# Patient Record
Sex: FEMALE | Race: WHITE | NOT HISPANIC OR LATINO | Employment: OTHER | URBAN - METROPOLITAN AREA
[De-identification: names, ages, dates, MRNs, and addresses within clinical notes are randomized per-mention and may not be internally consistent; named-entity substitution may affect disease eponyms.]

---

## 2017-01-03 ENCOUNTER — HOSPITAL ENCOUNTER (OUTPATIENT)
Dept: RADIOLOGY | Facility: MEDICAL CENTER | Age: 62
End: 2017-01-03
Attending: NURSE PRACTITIONER
Payer: COMMERCIAL

## 2017-01-03 ENCOUNTER — HOSPITAL ENCOUNTER (OUTPATIENT)
Dept: PHYSICAL THERAPY | Facility: REHABILITATION | Age: 62
End: 2017-01-03
Attending: NURSE PRACTITIONER
Payer: COMMERCIAL

## 2017-01-03 DIAGNOSIS — M25.561 ACUTE PAIN OF RIGHT KNEE: ICD-10-CM

## 2017-01-03 DIAGNOSIS — M79.672 BILATERAL FOOT PAIN: ICD-10-CM

## 2017-01-03 DIAGNOSIS — M79.671 BILATERAL FOOT PAIN: ICD-10-CM

## 2017-01-03 PROCEDURE — 97110 THERAPEUTIC EXERCISES: CPT

## 2017-01-03 PROCEDURE — 77077 JOINT SURVEY SINGLE VIEW: CPT

## 2017-01-03 PROCEDURE — 97161 PT EVAL LOW COMPLEX 20 MIN: CPT

## 2017-01-03 PROCEDURE — 73564 X-RAY EXAM KNEE 4 OR MORE: CPT | Mod: RT

## 2017-01-04 ENCOUNTER — TELEPHONE (OUTPATIENT)
Dept: MEDICAL GROUP | Facility: MEDICAL CENTER | Age: 62
End: 2017-01-04

## 2017-01-04 NOTE — TELEPHONE ENCOUNTER
----- Message from Shirin Hill M.D. sent at 1/4/2017 11:32 AM PST -----  Please advise pt foot x-ray shows osteoarthritis at the left big toe joint. Her right foot is normal and there are no other joints affected.  Shirin Hill M.D. covering for DORYS Lopez

## 2017-01-04 NOTE — TELEPHONE ENCOUNTER
----- Message from DORYS Lopez sent at 1/4/2017  7:56 AM PST -----  Please let Fadumo know that her knee x-ray shows arthritis.  I would like her to see an orthopedist to go over x-ray and potential treatment.  Is she ok with this and does she have an Ortho preference?

## 2017-01-05 ENCOUNTER — HOSPITAL ENCOUNTER (OUTPATIENT)
Dept: PHYSICAL THERAPY | Facility: REHABILITATION | Age: 62
End: 2017-01-05
Attending: NURSE PRACTITIONER
Payer: COMMERCIAL

## 2017-01-05 PROCEDURE — 97110 THERAPEUTIC EXERCISES: CPT

## 2017-01-05 PROCEDURE — 97140 MANUAL THERAPY 1/> REGIONS: CPT

## 2017-01-09 ENCOUNTER — HOSPITAL ENCOUNTER (OUTPATIENT)
Dept: PHYSICAL THERAPY | Facility: REHABILITATION | Age: 62
End: 2017-01-09
Attending: NURSE PRACTITIONER
Payer: COMMERCIAL

## 2017-01-09 PROCEDURE — 97140 MANUAL THERAPY 1/> REGIONS: CPT

## 2017-01-09 PROCEDURE — 97110 THERAPEUTIC EXERCISES: CPT

## 2017-01-11 ENCOUNTER — APPOINTMENT (OUTPATIENT)
Dept: PHYSICAL THERAPY | Facility: REHABILITATION | Age: 62
End: 2017-01-11
Attending: NURSE PRACTITIONER
Payer: COMMERCIAL

## 2017-01-13 ENCOUNTER — HOSPITAL ENCOUNTER (OUTPATIENT)
Dept: LAB | Facility: MEDICAL CENTER | Age: 62
End: 2017-01-13
Attending: NURSE PRACTITIONER
Payer: COMMERCIAL

## 2017-01-13 DIAGNOSIS — E78.49 OTHER HYPERLIPIDEMIA: ICD-10-CM

## 2017-01-13 DIAGNOSIS — M25.50 MULTIPLE JOINT PAIN: ICD-10-CM

## 2017-01-13 DIAGNOSIS — R73.09 ELEVATED HEMOGLOBIN A1C: ICD-10-CM

## 2017-01-13 LAB
ALBUMIN SERPL BCP-MCNC: 4.5 G/DL (ref 3.2–4.9)
ALBUMIN/GLOB SERPL: 1.6 G/DL
ALP SERPL-CCNC: 60 U/L (ref 30–99)
ALT SERPL-CCNC: 15 U/L (ref 2–50)
ANION GAP SERPL CALC-SCNC: 9 MMOL/L (ref 0–11.9)
AST SERPL-CCNC: 18 U/L (ref 12–45)
BASOPHILS # BLD AUTO: 0.03 K/UL (ref 0–0.12)
BASOPHILS NFR BLD AUTO: 0.6 % (ref 0–1.8)
BILIRUB SERPL-MCNC: 0.5 MG/DL (ref 0.1–1.5)
BUN SERPL-MCNC: 24 MG/DL (ref 8–22)
CALCIUM SERPL-MCNC: 9.6 MG/DL (ref 8.5–10.5)
CHLORIDE SERPL-SCNC: 97 MMOL/L (ref 96–112)
CHOLEST SERPL-MCNC: 134 MG/DL (ref 100–199)
CO2 SERPL-SCNC: 29 MMOL/L (ref 20–33)
CREAT SERPL-MCNC: 0.93 MG/DL (ref 0.5–1.4)
EOSINOPHIL # BLD: 0.11 K/UL (ref 0–0.51)
EOSINOPHIL NFR BLD AUTO: 2.4 % (ref 0–6.9)
ERYTHROCYTE [DISTWIDTH] IN BLOOD BY AUTOMATED COUNT: 47 FL (ref 35.9–50)
ERYTHROCYTE [SEDIMENTATION RATE] IN BLOOD BY WESTERGREN METHOD: 26 MM/HOUR (ref 0–30)
EST. AVERAGE GLUCOSE BLD GHB EST-MCNC: 131 MG/DL
GLOBULIN SER CALC-MCNC: 2.9 G/DL (ref 1.9–3.5)
GLUCOSE SERPL-MCNC: 177 MG/DL (ref 65–99)
HBA1C MFR BLD: 6.2 % (ref 0–5.6)
HCT VFR BLD AUTO: 38.5 % (ref 37–47)
HDLC SERPL-MCNC: 43 MG/DL
HGB BLD-MCNC: 12.2 G/DL (ref 12–16)
IMM GRANULOCYTES # BLD AUTO: 0.02 K/UL (ref 0–0.11)
IMM GRANULOCYTES NFR BLD AUTO: 0.4 % (ref 0–0.9)
LDLC SERPL CALC-MCNC: 66 MG/DL
LYMPHOCYTES # BLD: 1.48 K/UL (ref 1–4.8)
LYMPHOCYTES NFR BLD AUTO: 31.8 % (ref 22–41)
MCH RBC QN AUTO: 27.7 PG (ref 27–33)
MCHC RBC AUTO-ENTMCNC: 31.7 G/DL (ref 33.6–35)
MCV RBC AUTO: 87.5 FL (ref 81.4–97.8)
MONOCYTES # BLD: 0.54 K/UL (ref 0–0.85)
MONOCYTES NFR BLD AUTO: 11.6 % (ref 0–13.4)
NEUTROPHILS # BLD: 2.47 K/UL (ref 2–7.15)
NEUTROPHILS NFR BLD AUTO: 53.2 % (ref 44–72)
NRBC # BLD AUTO: 0 K/UL
NRBC BLD-RTO: 0 /100 WBC
PLATELET # BLD AUTO: 226 K/UL (ref 164–446)
PMV BLD AUTO: 11.5 FL (ref 9–12.9)
POTASSIUM SERPL-SCNC: 3.7 MMOL/L (ref 3.6–5.5)
PROT SERPL-MCNC: 7.4 G/DL (ref 6–8.2)
RBC # BLD AUTO: 4.4 M/UL (ref 4.2–5.4)
RHEUMATOID FACT SERPL-ACNC: <10 IU/ML (ref 0–14)
SODIUM SERPL-SCNC: 135 MMOL/L (ref 135–145)
TRIGL SERPL-MCNC: 127 MG/DL (ref 0–149)
TSH SERPL DL<=0.005 MIU/L-ACNC: 2.31 UIU/ML (ref 0.3–3.7)
WBC # BLD AUTO: 4.7 K/UL (ref 4.8–10.8)

## 2017-01-13 PROCEDURE — 85652 RBC SED RATE AUTOMATED: CPT

## 2017-01-13 PROCEDURE — 83036 HEMOGLOBIN GLYCOSYLATED A1C: CPT

## 2017-01-13 PROCEDURE — 86431 RHEUMATOID FACTOR QUANT: CPT

## 2017-01-13 PROCEDURE — 36415 COLL VENOUS BLD VENIPUNCTURE: CPT

## 2017-01-13 PROCEDURE — 80053 COMPREHEN METABOLIC PANEL: CPT

## 2017-01-13 PROCEDURE — 85025 COMPLETE CBC W/AUTO DIFF WBC: CPT

## 2017-01-13 PROCEDURE — 86038 ANTINUCLEAR ANTIBODIES: CPT

## 2017-01-13 PROCEDURE — 80061 LIPID PANEL: CPT

## 2017-01-13 PROCEDURE — 84443 ASSAY THYROID STIM HORMONE: CPT

## 2017-01-15 LAB — NUCLEAR IGG SER QL IA: NORMAL

## 2017-01-16 ENCOUNTER — OFFICE VISIT (OUTPATIENT)
Dept: MEDICAL GROUP | Facility: MEDICAL CENTER | Age: 62
End: 2017-01-16
Payer: COMMERCIAL

## 2017-01-16 VITALS
DIASTOLIC BLOOD PRESSURE: 72 MMHG | WEIGHT: 205 LBS | SYSTOLIC BLOOD PRESSURE: 118 MMHG | HEART RATE: 96 BPM | OXYGEN SATURATION: 96 % | HEIGHT: 65 IN | RESPIRATION RATE: 18 BRPM | TEMPERATURE: 96.8 F | BODY MASS INDEX: 34.16 KG/M2

## 2017-01-16 DIAGNOSIS — E66.9 OBESITY (BMI 30-39.9): ICD-10-CM

## 2017-01-16 DIAGNOSIS — Z12.31 ENCOUNTER FOR SCREENING MAMMOGRAM FOR BREAST CANCER: ICD-10-CM

## 2017-01-16 DIAGNOSIS — R73.09 ELEVATED HEMOGLOBIN A1C: ICD-10-CM

## 2017-01-16 DIAGNOSIS — Z00.00 WELL ADULT EXAM: ICD-10-CM

## 2017-01-16 PROCEDURE — 99396 PREV VISIT EST AGE 40-64: CPT | Performed by: NURSE PRACTITIONER

## 2017-01-16 NOTE — MR AVS SNAPSHOT
"        Fadumo Adair   2017 11:00 AM   Office Visit   MRN: [id number]    Department:  [address]   Dept Phone:  [phone]    Description:  Female : 1955   Provider:  DORYS Lopez           Reason for Visit     Annual Wellness Visit           Allergies as of 2017     No Known Allergies      You were diagnosed with     Well adult exam   [076028]       Elevated hemoglobin A1c   [356456]       Encounter for screening mammogram for breast cancer   [0744825]         Vital Signs     Blood Pressure Pulse Temperature Respirations Height Weight    118/72 mmHg 96 36 °C (96.8 °F) 18 1.638 m (5' 4.5\") 92.987 kg (205 lb)    Body Mass Index Oxygen Saturation Last Menstrual Period Smoking Status          34.66 kg/m2 96% 09/10/2011 Former Smoker        Basic Information     Date Of Birth Sex Race Ethnicity Preferred Language    1955 Female White Non- English      Your appointments     2017 11:45 AM   [place] [facility] [place]    [address] [facility] [address]   [phone]            2017  9:30 AM   PT Follow Up 45 Minutes [facility] [place]    [address] [facility] [address]   [phone]            2017 11:45 AM   PT Follow Up 45 Minutes [facility] [place]    [address] [facility] [address]   [phone]            2017 11:45 AM   PT Follow Up 45 Minutes [facility] [place]    [address] [facility] [address]   [phone]            2017 10:15 AM   PT Follow Up 45 Minutes [facility] [place]    [address] [facility] [address]   [phone]            2017 11:00 AM   PT Follow Up 45 Minutes with Carlton Sigala, " P.T.   [facility] [place]    [address] [facility] [address]   [phone]            Feb 06, 2017 10:15 AM   PT Follow Up 45 Minutes [facility] [place]    [address] [facility] [address]   [phone]            Feb 08, 2017 11:00 AM   PT Follow Up 45 Minutes with [facility] [place]    [address] [facility] [address]   [phone]            May 16, 2017 11:00 AM   Established Patient with DORYS Lopez   [facility] ([address])    [address] [place] [address] [address]  [facility] [address]   [phone]           You will be receiving a confirmation call a few days before your appointment from our automated call confirmation system.              Problem List              ICD-10-CM Priority Class Noted - Resolved    Hyperlipemia E78.5   Unknown - Present    Low back pain M54.5   Unknown - Present    Family history of ovarian cancer Z80.41   8/4/2010 - Present    Obesity (BMI 30-39.9) E66.9   3/10/2012 - Present    Insomnia G47.00   5/14/2014 - Present    Elevated hemoglobin A1c R73.09   11/18/2014 - Present    Essential hypertension I10   10/23/2015 - Present    Generalized headaches R51   12/19/2016 - Present      Health Maintenance        Date Due Completion Dates    MAMMOGRAM 5/30/2014 5/30/2013, 5/19/2011, 5/21/2007, 5/21/2007, 3/23/2006, 3/21/2005, 1/5/2004    PAP SMEAR 5/14/2017 5/14/2014, 5/18/2011    COLONOSCOPY 5/14/2019 5/14/2009 (Done)    Override on 5/14/2009: Done    IMM DTaP/Tdap/Td Vaccine (2 - Td) 12/13/2020 12/13/2010            Current Immunizations     Hep A/HEP B Combined Vaccine (TwinRix) 8/30/2012 10:43 AM    Hepatitis A Vaccine, Ped/Adol 12/13/2010  3:53 PM    IPV 8/30/2012 10:43 AM    Influenza TIV (IM) 10/15/2016, 10/11/2011  7:08 PM    Influenza Vaccine Pediatric 12/13/2010  3:54 PM    Influenza Vaccine Quad Inj (Pf) 9/23/2014     Influenza Vaccine Quad Inj (Preserved) 10/23/2015  5:05 PM    Tdap Vaccine 12/13/2010  3:52 PM      Below and/or attached are the medications your provider expects you to take. Review all of your home medications and newly ordered medications with your provider and/or pharmacist. Follow medication instructions as directed by your provider and/or pharmacist. Please keep your medication list with you and share with your provider. Update the information when medications are discontinued, doses are changed, or new medications (including over-the-counter products) are added; and carry medication information at all times in the event of emergency situations     Allergies:  No Known Allergies          Medications  Valid as of: January 16, 2017 - 11:35 AM    Generic Name Brand Name Tablet Size Instructions for use    Acetaminophen-Codeine (Tab) TYLENOL #3 300-30 MG Take 1-2 Tabs by mouth every four hours as needed.        ALPRAZolam (Tab) XANAX 0.5 MG TAKE ONE TABLET BY MOUTH TWICE DAILY AS NEEDED FOR ANXIETY        Cholecalciferol   Take 2,000 Units by mouth every day.        Cimetidine   Take  by mouth every day.        Coenzyme Q10 (Cap) coenzyme Q-10 30 MG Take 30 mg by mouth every day.        HydroCHLOROthiazide (Tab) HYDRODIURIL 25 MG Take 1 Tab by mouth every day. For fluid retention        Ibuprofen (Tab) MOTRIN 200 MG Take 400 mg by mouth as needed.        Lisinopril (Tab) PRINIVIL 20 MG TAKE ONE TABLET BY MOUTH ONCE DAILY        Meloxicam (Tab) MOBIC 15 MG Take 1 Tab by mouth every day. With food for arthritis.        Rosuvastatin Calcium (Tab) CRESTOR 20 MG Take 1 Tab by mouth every evening.        TraMADol HCl (Tab) ULTRAM 50 MG Take 1 Tab by mouth every 6 hours as needed.        Zolpidem Tartrate (Tab) AMBIEN 10 MG TAKE ONE TABLET BY MOUTH AT BEDTIME AS NEEDED FOR SLEEP        .                 Medicines prescribed today were sent to:     [facility] [address] [place] [address], [place] - [address] [address]  [facility] [address]    Phone: [phone] Fax: [phone]    Open 24 Hours?: No      Medication refill instructions:       If your prescription bottle indicates you have medication refills left, it is not necessary to call your provider’s office. Please contact your pharmacy and they will refill your medication.    If your prescription bottle indicates you do not have any refills left, you may request refills at any time through one of the following ways: The online [employer] system (except Urgent Care), by calling your provider’s office, or by asking your pharmacy to contact your provider’s office with a refill request. Medication refills are processed only during regular business hours and may not be available until the next business day. Your provider may request additional information or to have a follow-up visit with you prior to refilling your medication.   *Please Note: Medication refills are assigned a new Rx number when refilled electronically. Your pharmacy may indicate that no refills were authorized even though a new prescription for the same medication is available at the pharmacy. Please request the medicine by name with the pharmacy before contacting your provider for a refill.        Your To Do List     Future Labs/Procedures Complete By Expires    MA-SCREEN MAMMO W/CAD-BILAT  As directed 2/17/2018         [employer] Access Code: [email or web address]  Expires: 1/18/2017  2:58 PM    [employer]  A secure, online tool to manage your health information     [employer]’s [employer]® is a secure, online tool that connects you to your personalized health information from the privacy of your home -- day or night - making it very easy for you to manage your healthcare. Once the activation process is completed, you can even access your medical information using the [employer] bernardo, which is available for free in the Apple Bernardo store or Google Play store.     [employer] provides the following levels of access (as  shown below):   My Chart Features   [facility]  Urgent  Care [facility]  [facility] Care  Doctor   Email your healthcare team securely and privately 24/7 X X X    Manage appointments: schedule your next appointment; view details of past/upcoming appointments X      Request prescription refills. X      View recent personal medical records, including lab and immunizations X X X X   View health record, including health history, allergies, medications X X X X   Read reports about your outpatient visits, procedures, consult and ER notes X X X X   See your discharge summary, which is a recap of your hospital and/or ER visit that includes your diagnosis, lab results, and care plan. X X       How to register for [employer]:  1. Go to  [email or web address].  2. Click on the Sign Up Now box, which takes you to the New Member Sign Up page. You will need to provide the following information:  a. Enter your [employer] Access Code exactly as it appears at the top of this page. (You will not need to use this code after you’ve completed the sign-up process. If you do not sign up before the expiration date, you must request a new code.)   b. Enter your date of birth.   c. Enter your home email address.   d. Click Submit, and follow the next screen’s instructions.  3. Create a [employer] ID. This will be your [employer] login ID and cannot be changed, so think of one that is secure and easy to remember.  4. Create a [employer] password. You can change your password at any time.  5. Enter your Password Reset Question and Answer. This can be used at a later time if you forget your password.   6. Enter your e-mail address. This allows you to receive e-mail notifications when new information is available in [employer]gle.  7. Click Sign Up. You can now view your health information.    For assistance activating your [employer] account, call [phone]

## 2017-01-16 NOTE — PROGRESS NOTES
CC:    Annual physical exam    HPI:  Fadumo is a 61 y.o. here for annual physical exam. Overall she states that she feels better than our last visit one month ago. She reports that her knee pain is improving with physical therapy and subsequently her foot pain has improved. She is taking meloxicam once daily and denies any black or bloody stools or increase in her heartburn. She reports that her headaches are also improving, physical therapist thought this was related to her posture.    PCP: DORYS Lopez  Patient Care Team:  DORYS Lopez as PCP - General (Family Medicine)    Patient Active Problem List    Diagnosis Date Noted   • Generalized headaches 12/19/2016   • Essential hypertension 10/23/2015   • Elevated hemoglobin A1c 11/18/2014   • Insomnia 05/14/2014   • Obesity (BMI 30-39.9) 03/10/2012   • Family history of ovarian cancer 08/04/2010   • Hyperlipemia    • Low back pain      Current Outpatient Prescriptions   Medication Sig Dispense Refill   • coenzyme Q-10 30 MG capsule Take 30 mg by mouth every day.     • zolpidem (AMBIEN) 10 MG Tab TAKE ONE TABLET BY MOUTH AT BEDTIME AS NEEDED FOR SLEEP 30 Tab 0   • alprazolam (XANAX) 0.5 MG Tab TAKE ONE TABLET BY MOUTH TWICE DAILY AS NEEDED FOR ANXIETY 20 Tab 1   • hydrochlorothiazide (HYDRODIURIL) 25 MG Tab Take 1 Tab by mouth every day. For fluid retention 90 Tab 3   • acetaminophen-codeine #3 (TYLENOL #3) 300-30 MG Tab Take 1-2 Tabs by mouth every four hours as needed. 30 Tab 1   • meloxicam (MOBIC) 15 MG tablet Take 1 Tab by mouth every day. With food for arthritis. 30 Tab 3   • tramadol (ULTRAM) 50 MG Tab Take 1 Tab by mouth every 6 hours as needed. 90 Tab 0   • rosuvastatin (CRESTOR) 20 MG Tab Take 1 Tab by mouth every evening. 90 Tab 2   • lisinopril (PRINIVIL) 20 MG Tab TAKE ONE TABLET BY MOUTH ONCE DAILY 90 Tab 2   • Cholecalciferol (VITAMIN D PO) Take 2,000 Units by mouth every day.     • ibuprofen (MOTRIN) 200 MG TABS Take 400 mg by  "mouth as needed.     • Dihydroxyaluminum Sod Carb (ACID RELIEF PO) Take  by mouth every day.       No current facility-administered medications for this visit.      Allergies: Review of patient's allergies indicates no known allergies.    Screening:  Depressive Symptoms: Denies feeling down, depressed or hopeless. Denies loss of interest or pleasure in doing things  Exercise: No  Current social contact/activities: Yes, very involved with friends, family and her children  Social History   Substance Use Topics   • Smoking status: Former Smoker -- 0.50 packs/day     Types: Cigarettes     Quit date: 02/01/2011   • Smokeless tobacco: Never Used   • Alcohol Use: 0.5 oz/week     1 drink(s) per week       No family history on file.  Past Surgical History   Procedure Laterality Date   • Tonsillectomy and adenoidectomy       age 7   • Primary c section       x 2   • Hysterectomy robotic  10/11/2011     Performed by MOLINA LOGAN at SURGERY [facility] ORS   • Cystoscopy  10/11/2011     Performed by MOLINA LOGAN at [facility] ORS   • Vaginal perineal exam repair  10/11/2011     Performed by MOLINA LOGAN at [facility] ORS       ROS:    No fever, chills, sweats.  No polydipsia, polyuria, temperature intolerance, significant weight changes  No visual changes, blurred vision. Last eye exam -within the past 12 months  No chest pain, palpitations, peripheral swelling  No chronic cough, shortness of breath, dyspnea with exertion.  No dysphagia, odynophagia, black or bloody stools.  No abdominal pain, nausea, persistent diarrhea, constipation  No dysuria, hematuria. Denies incontinence. Has nocturia 1+ times per night.   No rash, pruritis, pigment changes.  No focal weakness, syncope, headache, confusion, persistent numbness.  All other systems are reviewed and negative.        /72 mmHg  Pulse 96  Temp(Src) 36 °C (96.8 °F)  Resp 18  Ht 1.638 m (5' 4.5\")  Wt 92.987 kg (205 lb)  BMI " 34.66 kg/m2  SpO2 96%  LMP 09/10/2011 Body mass index is 34.66 kg/(m^2).    Physical Exam:  Gait: normal.   General appearance: Alert in no acute distress.  Eyes: EOMI, PERRL, conjunctivae non-injected, sclerae non-icteric. No ptosis or proptosis.  Ears: normal pinnae and external auditory canals. Hearing excellent.   TM pearly gray with normal light reflex bilaterally.  Nose: nares patent with no significant congestion.   Oropharynx: pink and moist with no thrush. Dentition good  Neck: supple with no cervical LAD, thyroid nodules, JVD or carotid bruits.  Lungs: CTAB with good excursion. No crackles or wheeze.   Heart: RRR without significant murmur.   Chest: no subareolar masses or axillary adenopathy.   Abdomen: Abdomen soft, non-tender. BS normal. No masses appreciated. No inguinal adenopathy.  Extremities: warm and well-perfused with no edema. Feet with no maceration, ulcers, fissures.   Muscles: strength 5/5 UE and LE, proximal and distal motor groups. Full range motion all joints no major deformities.  Neuro:     CN II-XII grossly intact.           Skin:        color normal, vascularity normal, no evidence of bleeding or bruising, no lesions noted, no edema, temperature normal      Assessment and Plan. The following treatment and monitoring plan is recommended:   1. Well adult exam  -Encourage monthly self breast exam  Encourage daily exercise for at least 30 minutes  Recommend mammogram   Recommend 1500 mg Calcium with 600 units vit d daily.    Follow up in May when due for Pap smear  Reviewed all lab work, see below    2. Elevated hemoglobin A1c  -Reiterated the extreme dangers of diabetes with her including risk of retinopathy, nephropathy, neuropathy, cardiovascular damage and she verbalized understanding. We had a long discussion about eliminating white carbohydrates, starting an exercise program and losing 25 pounds in the next 4 months through exercise and portion control. Encouraged her to go ahead  and start metformin at 500 mg once daily which she declined. Discussed the importance of proper foot care, seeing her eye doctor yearly, and close follow-ups with me. We'll check her A1c at her Pap smear in May.    3. Encounter for screening mammogram for breast cancer  - MA-SCREEN MAMMO W/CAD-BILAT; Future    4.  Hyperlipidemia: Stable. Continue atorvastatin.    5.  Multiple joint pain: Improving. Continue with efforts at weight loss, stretching, physical therapy and strengthening. Reviewed GI and renal side effects of meloxicam. She understands not to take tramadol and Xanax together. She is only utilizing Tylenol No. 3 when she travels and walks long distances.

## 2017-01-17 ENCOUNTER — HOSPITAL ENCOUNTER (OUTPATIENT)
Dept: PHYSICAL THERAPY | Facility: REHABILITATION | Age: 62
End: 2017-01-17
Attending: NURSE PRACTITIONER
Payer: COMMERCIAL

## 2017-01-17 PROCEDURE — 97110 THERAPEUTIC EXERCISES: CPT

## 2017-01-17 PROCEDURE — 97140 MANUAL THERAPY 1/> REGIONS: CPT

## 2017-01-19 ENCOUNTER — HOSPITAL ENCOUNTER (OUTPATIENT)
Dept: PHYSICAL THERAPY | Facility: REHABILITATION | Age: 62
End: 2017-01-19
Attending: NURSE PRACTITIONER
Payer: COMMERCIAL

## 2017-01-19 PROCEDURE — 97140 MANUAL THERAPY 1/> REGIONS: CPT

## 2017-01-19 PROCEDURE — 97110 THERAPEUTIC EXERCISES: CPT

## 2017-01-24 ENCOUNTER — HOSPITAL ENCOUNTER (OUTPATIENT)
Dept: PHYSICAL THERAPY | Facility: REHABILITATION | Age: 62
End: 2017-01-24
Attending: NURSE PRACTITIONER
Payer: COMMERCIAL

## 2017-01-24 RX ORDER — ZOLPIDEM TARTRATE 10 MG/1
TABLET ORAL
Qty: 30 TAB | Refills: 2 | Status: SHIPPED
Start: 2017-01-24 | End: 2017-04-17 | Stop reason: SDUPTHER

## 2017-01-24 NOTE — TELEPHONE ENCOUNTER
Was the patient seen in the last year in this department? Yes 1/16/2017    Does patient have an active prescription for medications requested? Yes     Received Request Via: Pharmacy

## 2017-01-26 ENCOUNTER — HOSPITAL ENCOUNTER (OUTPATIENT)
Dept: PHYSICAL THERAPY | Facility: REHABILITATION | Age: 62
End: 2017-01-26
Attending: NURSE PRACTITIONER
Payer: COMMERCIAL

## 2017-01-26 PROCEDURE — 97110 THERAPEUTIC EXERCISES: CPT

## 2017-01-30 ENCOUNTER — HOSPITAL ENCOUNTER (OUTPATIENT)
Dept: PHYSICAL THERAPY | Facility: REHABILITATION | Age: 62
End: 2017-01-30
Attending: NURSE PRACTITIONER
Payer: COMMERCIAL

## 2017-01-30 PROCEDURE — 97110 THERAPEUTIC EXERCISES: CPT

## 2017-01-30 PROCEDURE — 97140 MANUAL THERAPY 1/> REGIONS: CPT

## 2017-02-01 ENCOUNTER — HOSPITAL ENCOUNTER (OUTPATIENT)
Dept: PHYSICAL THERAPY | Facility: REHABILITATION | Age: 62
End: 2017-02-01
Attending: NURSE PRACTITIONER
Payer: COMMERCIAL

## 2017-02-01 PROCEDURE — 97112 NEUROMUSCULAR REEDUCATION: CPT

## 2017-02-01 PROCEDURE — 97110 THERAPEUTIC EXERCISES: CPT

## 2017-02-06 ENCOUNTER — HOSPITAL ENCOUNTER (OUTPATIENT)
Dept: PHYSICAL THERAPY | Facility: REHABILITATION | Age: 62
End: 2017-02-06
Attending: NURSE PRACTITIONER
Payer: COMMERCIAL

## 2017-02-06 PROCEDURE — 97140 MANUAL THERAPY 1/> REGIONS: CPT

## 2017-02-06 PROCEDURE — 97112 NEUROMUSCULAR REEDUCATION: CPT

## 2017-02-06 PROCEDURE — 97110 THERAPEUTIC EXERCISES: CPT

## 2017-02-08 ENCOUNTER — HOSPITAL ENCOUNTER (OUTPATIENT)
Dept: PHYSICAL THERAPY | Facility: REHABILITATION | Age: 62
End: 2017-02-08
Attending: NURSE PRACTITIONER
Payer: COMMERCIAL

## 2017-02-08 PROCEDURE — 97110 THERAPEUTIC EXERCISES: CPT

## 2017-04-17 DIAGNOSIS — F41.9 ANXIETY: ICD-10-CM

## 2017-04-17 RX ORDER — ZOLPIDEM TARTRATE 10 MG/1
TABLET ORAL
Qty: 30 TAB | Refills: 0 | Status: SHIPPED
Start: 2017-04-17 | End: 2017-05-12 | Stop reason: SDUPTHER

## 2017-04-17 RX ORDER — MELOXICAM 15 MG/1
TABLET ORAL
Qty: 30 TAB | Refills: 3 | Status: SHIPPED | OUTPATIENT
Start: 2017-04-17 | End: 2017-06-15 | Stop reason: SDUPTHER

## 2017-04-17 NOTE — TELEPHONE ENCOUNTER
Was the patient seen in the last year in this department? Yes     Does patient have an active prescription for medications requested? No     Received Request Via: Pharmacy     Per  last fill: 2-21-17 # 30

## 2017-04-17 NOTE — TELEPHONE ENCOUNTER
Was the patient seen in the last year in this department? Yes  refill 3/20/17    Does patient have an active prescription for medications requested? No     Received Request Via: Pharmacy

## 2017-04-25 RX ORDER — TRAMADOL HYDROCHLORIDE 50 MG/1
50 TABLET ORAL EVERY 6 HOURS PRN
Qty: 90 TAB | Refills: 0 | Status: SHIPPED
Start: 2017-04-25 | End: 2017-06-23 | Stop reason: SDUPTHER

## 2017-04-25 NOTE — TELEPHONE ENCOUNTER
Was the patient seen in the last year in this department? Yes  Last Fill 2/28/17 # 90    Does patient have an active prescription for medications requested? No     Received Request Via: Pharmacy

## 2017-05-12 RX ORDER — ZOLPIDEM TARTRATE 10 MG/1
TABLET ORAL
Qty: 30 TAB | Refills: 0 | Status: SHIPPED
Start: 2017-05-12 | End: 2017-06-12 | Stop reason: SDUPTHER

## 2017-05-12 NOTE — TELEPHONE ENCOUNTER
Was the patient seen in the last year in this department? Yes     Does patient have an active prescription for medications requested? No     Received Request Via: Patient     Last visit:1/16/17

## 2017-06-12 RX ORDER — ZOLPIDEM TARTRATE 10 MG/1
TABLET ORAL
Qty: 30 TAB | Refills: 4 | Status: SHIPPED
Start: 2017-06-12 | End: 2017-07-19 | Stop reason: SDUPTHER

## 2017-06-12 RX ORDER — LISINOPRIL 20 MG/1
TABLET ORAL
Qty: 90 TAB | Refills: 0 | Status: SHIPPED | OUTPATIENT
Start: 2017-06-12 | End: 2017-09-12 | Stop reason: SDUPTHER

## 2017-06-12 NOTE — TELEPHONE ENCOUNTER
Was the patient seen in the last year in this department? Yes     Does patient have an active prescription for medications requested? No     Received Request Via: Pharmacy     Per  last fill: 5-15-17 # 30

## 2017-06-12 NOTE — TELEPHONE ENCOUNTER
Was the patient seen in the last year in this department? Yes     Does patient have an active prescription for medications requested? No     Received Request Via: Pharmacy     Last visit:1/16/17    Last  fill:4/18/17

## 2017-06-14 ENCOUNTER — TELEPHONE (OUTPATIENT)
Dept: MEDICAL GROUP | Facility: LAB | Age: 62
End: 2017-06-14

## 2017-06-14 NOTE — TELEPHONE ENCOUNTER
ESTABLISHED PATIENT PRE-VISIT PLANNING     Note: Patient will not be contacted if there is no indication to call.     1.  Reviewed notes from the last few office visits within the medical group: Yes    2.  If any orders were placed at last visit or intended to be done for this visit (i.e. 6 mos follow-up), do we have Results/Consult Notes?        •  Labs - Labs ordered, completed and results are in chart.  1/13/17       •  Imaging - Imaging ordered, NOT completed. Patient advised to complete prior to next appointment.  Last Mammo 5/30/13,  Patient not seen on order for 1/16/17 .  Last Colonoscopy on 9/20/09 at [facility]       •  Referrals - No referrals were ordered at last office visit.  PT  d/c'd on 3/4/17    3. Is this appointment scheduled as a Hospital Follow-Up? No    4.  Immunizations were updated in [employer] using Web[employer]?: Yes       •  Web [employer] Recommendations: MMR  TD VARICELLA (Chicken Pox)     5.  Patient is due for the following Health Maintenance Topics:   Health Maintenance Due   Topic Date Due   • MAMMOGRAM  05/30/2014   • PAP SMEAR  05/14/2017       - Patient has completed TDAP Immunization(s) per WebIZ. Chart has been updated.    6.  Patient was NOT informed to arrive 15 min prior to their scheduled appointment and bring in their medication bottles.

## 2017-06-15 ENCOUNTER — OFFICE VISIT (OUTPATIENT)
Dept: MEDICAL GROUP | Facility: LAB | Age: 62
End: 2017-06-15
Payer: COMMERCIAL

## 2017-06-15 VITALS
WEIGHT: 206 LBS | HEART RATE: 102 BPM | RESPIRATION RATE: 12 BRPM | OXYGEN SATURATION: 97 % | HEIGHT: 64 IN | SYSTOLIC BLOOD PRESSURE: 112 MMHG | TEMPERATURE: 98.9 F | DIASTOLIC BLOOD PRESSURE: 84 MMHG | BODY MASS INDEX: 35.17 KG/M2

## 2017-06-15 DIAGNOSIS — Z01.419 ENCOUNTER FOR GYNECOLOGICAL EXAMINATION: ICD-10-CM

## 2017-06-15 DIAGNOSIS — M15.9 PRIMARY OSTEOARTHRITIS INVOLVING MULTIPLE JOINTS: ICD-10-CM

## 2017-06-15 DIAGNOSIS — R73.09 ELEVATED HEMOGLOBIN A1C: ICD-10-CM

## 2017-06-15 LAB
HBA1C MFR BLD: NORMAL % (ref ?–5.8)
INT CON NEG: NEGATIVE
INT CON POS: POSITIVE

## 2017-06-15 PROCEDURE — 83036 HEMOGLOBIN GLYCOSYLATED A1C: CPT | Performed by: NURSE PRACTITIONER

## 2017-06-15 PROCEDURE — 99396 PREV VISIT EST AGE 40-64: CPT | Performed by: NURSE PRACTITIONER

## 2017-06-15 RX ORDER — MELOXICAM 15 MG/1
15 TABLET ORAL DAILY
Qty: 90 TAB | Refills: 3 | Status: SHIPPED | OUTPATIENT
Start: 2017-06-15 | End: 2018-05-02

## 2017-06-15 ASSESSMENT — PATIENT HEALTH QUESTIONNAIRE - PHQ9: CLINICAL INTERPRETATION OF PHQ2 SCORE: 0

## 2017-06-15 NOTE — PROGRESS NOTES
Chief Complaint   Patient presents with   • Gynecologic Exam       HPI:  Fadumo is a 61 y.o.  female who presents for annual exam. Generally the patient is feeling good. She is bothered periodically by arthritis pain but feels that meloxicam helps.  She had a hysterectomy in 2011 because of excessive uterine bleeding. Denies any problems with bowels or bladder.  She did have an elevated hemoglobin A1c in January of 6.3 and has been concentrating on riding her bicycle several days per week, enjoys this. Struggles with appetite and has not lost weight.  She is having difficulty sleeping through the night with Ambien, stating that it helps her fall asleep but not stay asleep. She would like to discuss other possible medications for sleep.  Regarding her health maintenance:   Last pap: 2014  Abnormal Pap hx: no  Periods: menopause  Contraception:  menopause  Last Mammo: overdue  Last colonoscopy: 2009 - repeat 10 years  Bone density test:N\A   Last Lab: due for A1C today  Last Td:current   Influenza vaccination:current   Pneumococcal vaccination:not applicable   Zostavax:not applicable   Hx STD''s: no   Regular exercise: yes      meds:   Current Outpatient Prescriptions   Medication Sig Dispense Refill   • zolpidem (AMBIEN) 10 MG Tab TAKE ONE TABLET BY MOUTH ONCE DAILY AT BEDTIME AS NEEDED FOR SLEEP 30 Tab 4   • lisinopril (PRINIVIL) 20 MG Tab TAKE ONE TABLET BY MOUTH ONCE DAILY 90 Tab 0   • acetaminophen-codeine #3 (TYLENOL #3) 300-30 MG Tab Take 1-2 Tabs by mouth every four hours as needed. 30 Tab 0   • alprazolam (XANAX) 0.5 MG Tab TAKE ONE TABLET BY MOUTH TWICE DAILY AS NEEDED FOR  ANXIETY  (DO  NOT  TAKE  WITH  AMBIEN) 20 Tab 0   • tramadol (ULTRAM) 50 MG Tab Take 1 Tab by mouth every 6 hours as needed. 90 Tab 0   • meloxicam (MOBIC) 15 MG tablet TAKE ONE TABLET BY MOUTH ONCE DAILY WITH  FOOD  FOR  ARTHRITIS 30 Tab 3   • coenzyme Q-10 30 MG capsule Take 30 mg by mouth every day.     • hydrochlorothiazide  "(HYDRODIURIL) 25 MG Tab Take 1 Tab by mouth every day. For fluid retention 90 Tab 3   • rosuvastatin (CRESTOR) 20 MG Tab Take 1 Tab by mouth every evening. 90 Tab 2   • Cholecalciferol (VITAMIN D PO) Take 2,000 Units by mouth every day.     • ibuprofen (MOTRIN) 200 MG TABS Take 400 mg by mouth as needed.     • Dihydroxyaluminum Sod Carb (ACID RELIEF PO) Take  by mouth every day.       No current facility-administered medications for this visit.       Allergies: No Known Allergies    family:   No family history on file.    social hx:   Social History     Social History   • Marital Status:      Spouse Name: N/A   • Number of Children: N/A   • Years of Education: N/A     Occupational History   • Not on file.     Social History Main Topics   • Smoking status: Former Smoker -- 0.50 packs/day     Types: Cigarettes     Quit date: 02/01/2011   • Smokeless tobacco: Never Used   • Alcohol Use: 0.5 oz/week     1 drink(s) per week   • Drug Use: No   • Sexual Activity: Not on file     Other Topics Concern   • Not on file     Social History Narrative       ROS:  No fever, chills, sweats.   No polydipsia, polyuria, temperature intolerance, significant weight changes   No visual changes, blurred vision.  No chest pain, palpitations, peripheral swelling   No chronic cough, shortness of breath, dyspnea with exertion.   No dysphagia, odynophagia, black or bloody stools.   No abdominal pain, nausea, persistent diarrhea, constipation   No dysuria, hematuria, incontinence. Denies nocturia  No rash, pruritis, pigment changes.   No focal weakness, syncope, headache, confusion, persistent numbness.   All other systems are reviewed and negative.    PHYSICAL EXAMINATION:  Blood pressure 112/84, pulse 102, temperature 37.2 °C (98.9 °F), resp. rate 12, height 1.638 m (5' 4.49\"), weight 93.441 kg (206 lb), last menstrual period 09/10/2011, SpO2 97 %.  General appearance:healthy, well developed, well nourished  Psych: alert, no distress, " cooperative  Eyes: EOM's normal, pupils equal, round, reactive to light  ENT: Ears: external ears normal to inspection and palpation, TM's clear, Nose/Sinuses: nose shows no deformity, asymmetry, or inflammation  Neck: no asymmetry, masses, or scars, no adenopathy, thyroid normal to palpation  Lungs:chest symmetric with normal A/P diameter, no chest deformities noted, normal respiratory rate and rhythm  Cardiovascular:regular rate and rhythm, S1 normal  Breasts: normal in size and symmetry, skin normal, physiologic fibronodularity  Abdomen: umbilicus normal, no masses palpable, no organomegaly  Musculoskeletal:ROM of all joints is normal, no evidence of joint instability  Lymphatic: None significantly enlarged  Skin: no rash, no edema  Neuro: mental status intact, cranial nerves 2-12 intact  Pelvic: external genitalia normal, cervix and uterus are surgically absent, adnexa without masses or tenderness, vaginal mucosa normal      ASSESSMENT/PLAN:  1.annual physical exam: HCM:  Pap and breast exams done.  BSE technique reviewed and patient encouraged to perform self-exam monthly.   Encourage monthly self breast exam  Encourage daily exercise for at least 30 minutes  Recommend mammogram yearly, reminded her that she is overdue for this. Colonoscopy in 2019 and the she's having bowel problems prior to.  Recommend 1500 mg Calcium with 600 units vit d daily.    Follow up one year for annual PAP  Recommend CBC, CMP, TSH, LP - fasting.  2.  Insomnia:  She recently picked up a full prescription of Ambien and would like to complete that prescription, she will email me when she is running low. Change to lunesta when this rx is over as ambien is not lasting all night.   3.  Prediabetes: A1c today is 5.8, this is excellent news. She'll continue riding her bicycle. Strongly encouraged her to work on her portion control, limiting her carbohydrates and focusing on a high vegetable/plan based diet. Recheck 6 months.

## 2017-06-15 NOTE — MR AVS SNAPSHOT
"        Fadumo Aadir   6/15/2017 11:00 AM   Office Visit   MRN: [id number]    Department:  [facility]   Dept Phone:  [phone]    Description:  Female : 1955   Provider:  DORYS Lopez           Reason for Visit     Gynecologic Exam           Allergies as of 6/15/2017     No Known Allergies      You were diagnosed with     Encounter for gynecological examination   [[id number]]       Elevated hemoglobin A1c   [[id number]]       Primary osteoarthritis involving multiple joints   [[id number]]         Vital Signs     Blood Pressure Pulse Temperature Respirations Height Weight    112/84 mmHg 102 37.2 °C (98.9 °F) 12 1.638 m (5' 4.49\") 93.441 kg (206 lb)    Body Mass Index Oxygen Saturation Last Menstrual Period Smoking Status          34.83 kg/m2 97% 09/10/2011 Former Smoker        Basic Information     Date Of Birth Sex Race Ethnicity Preferred Language    1955 Female White Non- English      Problem List              ICD-10-CM Priority Class Noted - Resolved    Hyperlipemia E78.5   Unknown - Present    Low back pain M54.5   Unknown - Present    Family history of ovarian cancer Z80.41   2010 - Present    Obesity (BMI 30-39.9) E66.9   3/10/2012 - Present    Insomnia G47.00   2014 - Present    Elevated hemoglobin A1c R73.09   2014 - Present    Essential hypertension I10   10/23/2015 - Present    Generalized headaches R51   2016 - Present      Health Maintenance        Date Due Completion Dates    MAMMOGRAM 2014, 2011, 2007, 2007, 3/23/2006, 3/21/2005, 2004    PAP SMEAR 2017, 2011    COLONOSCOPY 2019 (Done)    Override on 2009: Done    IMM DTaP/Tdap/Td Vaccine (2 - Td) 2020            Results     POCT  A1C      Component    Glycohemoglobin    5.8%    Internal Control Negative    Negative    Internal Control Positive    Positive                        Current Immunizations "     Hep A/HEP B Combined Vaccine (TwinRix) 8/30/2012 10:43 AM    Hepatitis A Vaccine, Ped/Adol 12/13/2010  3:53 PM    INFLUENZA VACCINE H1N1 10/22/2009    IPV 8/30/2012 10:43 AM    Influenza TIV (IM) 10/15/2016, 10/11/2011  7:08 PM    Influenza Vaccine Pediatric 12/13/2010  3:54 PM    Influenza Vaccine Quad Inj (Pf) 9/23/2014    Influenza Vaccine Quad Inj (Preserved) 10/23/2015  5:05 PM    Tdap Vaccine 12/13/2010  3:52 PM      Below and/or attached are the medications your provider expects you to take. Review all of your home medications and newly ordered medications with your provider and/or pharmacist. Follow medication instructions as directed by your provider and/or pharmacist. Please keep your medication list with you and share with your provider. Update the information when medications are discontinued, doses are changed, or new medications (including over-the-counter products) are added; and carry medication information at all times in the event of emergency situations     Allergies:  No Known Allergies          Medications  Valid as of: Ce 15, 2017 - 11:56 AM    Generic Name Brand Name Tablet Size Instructions for use    Acetaminophen-Codeine (Tab) TYLENOL #3 300-30 MG Take 1-2 Tabs by mouth every four hours as needed.        ALPRAZolam (Tab) XANAX 0.5 MG TAKE ONE TABLET BY MOUTH TWICE DAILY AS NEEDED FOR  ANXIETY  (DO  NOT  TAKE  WITH  AMBIEN)        Cholecalciferol   Take 2,000 Units by mouth every day.        Cimetidine   Take  by mouth every day.        Coenzyme Q10 (Cap) coenzyme Q-10 30 MG Take 30 mg by mouth every day.        HydroCHLOROthiazide (Tab) HYDRODIURIL 25 MG Take 1 Tab by mouth every day. For fluid retention        Ibuprofen (Tab) MOTRIN 200 MG Take 400 mg by mouth as needed.        Lisinopril (Tab) PRINIVIL 20 MG TAKE ONE TABLET BY MOUTH ONCE DAILY        Meloxicam (Tab) MOBIC 15 MG Take 1 Tab by mouth every day.        Rosuvastatin Calcium (Tab) CRESTOR 20 MG Take 1 Tab by mouth every  evening.        TraMADol HCl (Tab) ULTRAM 50 MG Take 1 Tab by mouth every 6 hours as needed.        .                 Medicines prescribed today were sent to:     [facility] [address] [place] [address] - [address] [facility]    [address] [facility] [address]    Phone: [phone] Fax: [phone]    Open 24 Hours?: No      Medication refill instructions:       If your prescription bottle indicates you have medication refills left, it is not necessary to call your provider’s office. Please contact your pharmacy and they will refill your medication.    If your prescription bottle indicates you do not have any refills left, you may request refills at any time through one of the following ways: The online [employer] system (except Urgent Care), by calling your provider’s office, or by asking your pharmacy to contact your provider’s office with a refill request. Medication refills are processed only during regular business hours and may not be available until the next business day. Your provider may request additional information or to have a follow-up visit with you prior to refilling your medication.   *Please Note: Medication refills are assigned a new Rx number when refilled electronically. Your pharmacy may indicate that no refills were authorized even though a new prescription for the same medication is available at the pharmacy. Please request the medicine by name with the pharmacy before contacting your provider for a refill.           [employer] Access Code: Activation code not generated  Current [employer] Status: Active

## 2017-06-23 RX ORDER — ALPRAZOLAM 0.5 MG/1
0.5 TABLET ORAL NIGHTLY PRN
Qty: 30 TAB | Refills: 0 | Status: SHIPPED
Start: 2017-06-23 | End: 2017-08-15 | Stop reason: SDUPTHER

## 2017-06-23 RX ORDER — TRAMADOL HYDROCHLORIDE 50 MG/1
50 TABLET ORAL EVERY 6 HOURS PRN
Qty: 90 TAB | Refills: 0 | Status: SHIPPED
Start: 2017-06-23 | End: 2017-07-26 | Stop reason: SDUPTHER

## 2017-07-05 DIAGNOSIS — G47.00 INSOMNIA, UNSPECIFIED TYPE: ICD-10-CM

## 2017-07-05 RX ORDER — ESZOPICLONE 3 MG/1
3 TABLET, FILM COATED ORAL NIGHTLY PRN
Qty: 15 TAB | Refills: 0 | Status: SHIPPED
Start: 2017-07-05 | End: 2017-08-28

## 2017-07-05 NOTE — TELEPHONE ENCOUNTER
Was the patient seen in the last year in this department? Yes     Does patient have an active prescription for medications requested? No     Received Request Via: Patient     Last visit:6/15/17    Last  fill:6/13/17 # 30

## 2017-07-19 RX ORDER — ZOLPIDEM TARTRATE 10 MG/1
TABLET ORAL
Qty: 30 TAB | Refills: 0 | Status: SHIPPED
Start: 2017-07-19 | End: 2017-08-15 | Stop reason: SDUPTHER

## 2017-07-19 NOTE — TELEPHONE ENCOUNTER
----- Message from Your Healthcare Team sent at 7/19/2017  8:35 AM PDT -----  Regarding: Prescription Question  Contact: [phone]  Hi   the linesta doesn't work well. i would request the ambien be refilled. thanks

## 2017-07-19 NOTE — TELEPHONE ENCOUNTER
Was the patient seen in the last year in this department? Yes  Last fill 6/12/17    Does patient have an active prescription for medications requested? No     Received Request Via: Patient

## 2017-07-26 RX ORDER — TRAMADOL HYDROCHLORIDE 50 MG/1
50 TABLET ORAL EVERY 6 HOURS PRN
Qty: 90 TAB | Refills: 0 | Status: SHIPPED
Start: 2017-07-26 | End: 2017-08-15 | Stop reason: SDUPTHER

## 2017-07-26 NOTE — TELEPHONE ENCOUNTER
Was the patient seen in the last year in this department? Yes     Does patient have an active prescription for medications requested? No     Received Request Via: Patient     Last visit:6/15/17    Last  fill:6/23/17

## 2017-08-15 RX ORDER — TRAMADOL HYDROCHLORIDE 50 MG/1
50 TABLET ORAL EVERY 6 HOURS PRN
Qty: 90 TAB | Refills: 0 | Status: SHIPPED
Start: 2017-08-15 | End: 2017-09-12 | Stop reason: SDUPTHER

## 2017-08-15 RX ORDER — ALPRAZOLAM 0.5 MG/1
0.5 TABLET ORAL NIGHTLY PRN
Qty: 30 TAB | Refills: 0 | Status: SHIPPED
Start: 2017-08-15 | End: 2017-09-12 | Stop reason: SDUPTHER

## 2017-08-15 RX ORDER — ZOLPIDEM TARTRATE 10 MG/1
TABLET ORAL
Qty: 30 TAB | Refills: 0 | Status: SHIPPED
Start: 2017-08-15 | End: 2017-11-22 | Stop reason: SDUPTHER

## 2017-08-15 NOTE — TELEPHONE ENCOUNTER
Was the patient seen in the last year in this department? Yes    Last Fill  Tylenol 7/5/17#30  Xanax 6/23/17 #30  Tramadol 7/26/17 #90  ambien 7/19/17 #30  Does patient have an active prescription for medications requested? No     Received Request Via: Patient

## 2017-08-16 ENCOUNTER — TELEPHONE (OUTPATIENT)
Dept: MEDICAL GROUP | Facility: LAB | Age: 62
End: 2017-08-16

## 2017-08-16 NOTE — TELEPHONE ENCOUNTER
Patient would like to know what she can do for pack pain when tramadol does not give relief. Would you like her to coming in for an appointment to talk about this?

## 2017-08-28 ENCOUNTER — OFFICE VISIT (OUTPATIENT)
Dept: MEDICAL GROUP | Facility: LAB | Age: 62
End: 2017-08-28
Payer: COMMERCIAL

## 2017-08-28 VITALS
BODY MASS INDEX: 35.48 KG/M2 | WEIGHT: 207.8 LBS | TEMPERATURE: 97.8 F | RESPIRATION RATE: 12 BRPM | DIASTOLIC BLOOD PRESSURE: 88 MMHG | HEIGHT: 64 IN | OXYGEN SATURATION: 93 % | SYSTOLIC BLOOD PRESSURE: 118 MMHG | HEART RATE: 98 BPM

## 2017-08-28 DIAGNOSIS — R73.09 ELEVATED HEMOGLOBIN A1C: ICD-10-CM

## 2017-08-28 DIAGNOSIS — G89.29 CHRONIC BILATERAL LOW BACK PAIN WITH LEFT-SIDED SCIATICA: ICD-10-CM

## 2017-08-28 DIAGNOSIS — M25.561 CHRONIC PAIN OF BOTH KNEES: ICD-10-CM

## 2017-08-28 DIAGNOSIS — G89.29 CHRONIC PAIN OF BOTH KNEES: ICD-10-CM

## 2017-08-28 DIAGNOSIS — M25.551 BILATERAL HIP PAIN: ICD-10-CM

## 2017-08-28 DIAGNOSIS — G47.00 INSOMNIA, UNSPECIFIED TYPE: ICD-10-CM

## 2017-08-28 DIAGNOSIS — M25.562 CHRONIC PAIN OF BOTH KNEES: ICD-10-CM

## 2017-08-28 DIAGNOSIS — Z79.891 CHRONIC USE OF OPIATE DRUGS THERAPEUTIC PURPOSES: ICD-10-CM

## 2017-08-28 DIAGNOSIS — M54.42 CHRONIC BILATERAL LOW BACK PAIN WITH LEFT-SIDED SCIATICA: ICD-10-CM

## 2017-08-28 DIAGNOSIS — M25.552 BILATERAL HIP PAIN: ICD-10-CM

## 2017-08-28 PROCEDURE — 99214 OFFICE O/P EST MOD 30 MIN: CPT | Performed by: NURSE PRACTITIONER

## 2017-08-28 NOTE — PROGRESS NOTES
Chief Complaint   Patient presents with   • Hip Pain     pt states she is having constant hip pain that sometimes radiates down her legs, getting worse        HPI  Fadumo is a 61-year-old established female here to follow-up on a few issues:   #1-multiple joint pain: Suffers from bilateral hip, low back and bilateral knee pain. She reports that she used only suffer at night, now hurts with extended periods of sitting and standing.  Pain is aching.  Pain radiates down left leg to knee sometimes.  Hip pain present at night since 20's.  Pain worsened x the past 6 months. She did do physical therapy for her left knee approximately 6 months ago which was helpful. She is not followed by orthopedics but is certainly willing to establish care. She does take meloxicam daily which is helpful for her although she admits that she supplements with ibuprofen several times per week. She also takes tramadol once during the day and occasionally 2 pills prior to bedtime for moderate pain. She wakes up in the middle of the night in pain she takes a Tylenol No. 3 which is helpful. She denies any negative side effects of tramadol or Tylenol No. 3.    Insomnia: Chronic issue. She takes Ambien 10 mg at night prior to bedtime reports that she cannot always fall asleep easily with Ambien alone because of her pain level. She wakes up easily in the morning, per patient. Denies daytime sedation.     Hyperglycemia: This is an issue we've been watching with her over the past year. Her last A1c was 5.8 June. She has started riding her bike for approximately 10 miles, 3-4 days per week. She has not lost weight.     Last xanax last night, tramadol last night, ambien last night.  No tylenol #3 in a few weeks.  Last alcohol 2 glasses of wine last night.      Past medical, surgical, family, and social history is reviewed and updated in Epic chart by me today.   Medications and allergies reviewed and updated in Epic chart by me today.     ROS:   As  "documented in history of present illness above    Exam:  Blood pressure 118/88, pulse 98, temperature 36.6 °C (97.8 °F), resp. rate 12, height 1.638 m (5' 4.49\"), weight 94.3 kg (207 lb 12.8 oz), last menstrual period 09/10/2011, SpO2 93 %.  Constitutional: Overweight female, Alert, no distress, plus 3 vital signs  Skin:  Warm, dry, no rashes invisible areas  Eye: Equal, round and reactive, conjunctiva clear  ENMT: Lips without lesions, good dentition, oropharynx clear    Neck: Trachea midline, no masses, no thyromegaly  Respiratory: Unlabored respiration, lungs clear to auscultation, no wheezes, no rhonchi  Cardiovascular: Normal rate and rhythm  Musculoskeletal: She experiences discomfort with complete flexion of her right knee, flexion and external rotation of her left hip and full flexion of her spine. She does not have any bony tenderness to her joints. Her gait is without a limp.  Psych: Alert, pleasant, well-groomed, normal affect    A/P:  1. Bilateral hip pain  -Recommend x-rays of hips and spine. Discussed that her hip pain may be coming from her spine and if this is seen on x-ray that her referral would be changed to physiatry. Encouraged her to work on trunk strength and weight loss. Encouraged her to continue on her bicycle. Discussed avoiding any other NSAID when taking meloxicam. Discussed potential GI side effects of meloxicam.  - REFERRAL TO ORTHOPEDICS  - DX-HIP-BILATERAL-WITH PELVIS-2 VIEWS; Future    2. Chronic bilateral low back pain with left-sided sciatica  - DX-LUMBAR SPINE-2 OR 3 VIEWS; Future    3. Chronic pain of both knees  -Encouraged her to continue on her bike, efforts at weight loss and meloxicam. Reviewed arthritis seen on left knee x-ray from January. Recommend establishing with orthopedics.    4. Chronic use of opiate drugs therapeutic purposes  Overall impression that this patient is benefiting from opioid therapy and that the benefits outweigh the risks of continued use. yes   - " Controlled Substance Use Agreement updated today  - Urine drug screen ordered today   - Additional lab: CMP to assess liver and renal function - UTD   - Rx refill prescriptions are done for 3 months, No early refills.   - Referral to pain management no    - [facility] SCREEN; Future    5. Elevated hemoglobin A1c  -Continue with efforts at weight loss and exercise. She is trying hard to avoid white carbohydrates. Recheck 6 months from June.    6. Insomnia, unspecified type  -Discussed polypharmacy at night with Ambien, occasional Xanax, tramadol and then taking Tylenol 3 in the middle of the night. Discussed that she should not mix these medications with alcohol and she verbalized understanding. Discussed potential increased risk of overdose and death with combining multiple controlled substances and she verbalized understanding and

## 2017-09-07 ENCOUNTER — HOSPITAL ENCOUNTER (OUTPATIENT)
Dept: RADIOLOGY | Facility: MEDICAL CENTER | Age: 62
End: 2017-09-07
Attending: NURSE PRACTITIONER
Payer: COMMERCIAL

## 2017-09-07 DIAGNOSIS — M25.552 BILATERAL HIP PAIN: ICD-10-CM

## 2017-09-07 DIAGNOSIS — M25.551 BILATERAL HIP PAIN: ICD-10-CM

## 2017-09-07 DIAGNOSIS — M54.42 CHRONIC BILATERAL LOW BACK PAIN WITH LEFT-SIDED SCIATICA: ICD-10-CM

## 2017-09-07 DIAGNOSIS — G89.29 CHRONIC BILATERAL LOW BACK PAIN WITH LEFT-SIDED SCIATICA: ICD-10-CM

## 2017-09-07 PROCEDURE — 73521 X-RAY EXAM HIPS BI 2 VIEWS: CPT

## 2017-09-07 PROCEDURE — 72100 X-RAY EXAM L-S SPINE 2/3 VWS: CPT

## 2017-10-12 RX ORDER — TRAMADOL HYDROCHLORIDE 50 MG/1
TABLET ORAL
Qty: 90 TAB | Refills: 2 | Status: SHIPPED
Start: 2017-10-12 | End: 2018-01-11 | Stop reason: SDUPTHER

## 2017-10-12 RX ORDER — ALPRAZOLAM 0.5 MG/1
TABLET ORAL
Qty: 30 TAB | Refills: 2 | Status: SHIPPED
Start: 2017-10-12 | End: 2018-01-11 | Stop reason: SDUPTHER

## 2017-10-20 NOTE — TELEPHONE ENCOUNTER
Was the patient seen in the last year in this department? Yes  Last Fill 9/25/17 #30    Does patient have an active prescription for medications requested? No     Received Request Via: Patient

## 2017-11-22 ENCOUNTER — OFFICE VISIT (OUTPATIENT)
Dept: MEDICAL GROUP | Facility: LAB | Age: 62
End: 2017-11-22
Payer: COMMERCIAL

## 2017-11-22 VITALS
HEART RATE: 104 BPM | WEIGHT: 209 LBS | DIASTOLIC BLOOD PRESSURE: 82 MMHG | HEIGHT: 65 IN | RESPIRATION RATE: 16 BRPM | BODY MASS INDEX: 34.82 KG/M2 | OXYGEN SATURATION: 95 % | TEMPERATURE: 99.3 F | SYSTOLIC BLOOD PRESSURE: 126 MMHG

## 2017-11-22 DIAGNOSIS — G47.00 INSOMNIA, UNSPECIFIED TYPE: ICD-10-CM

## 2017-11-22 DIAGNOSIS — M54.5 CHRONIC LOW BACK PAIN, UNSPECIFIED BACK PAIN LATERALITY, WITH SCIATICA PRESENCE UNSPECIFIED: ICD-10-CM

## 2017-11-22 DIAGNOSIS — R73.09 ELEVATED HEMOGLOBIN A1C: ICD-10-CM

## 2017-11-22 DIAGNOSIS — E78.49 OTHER HYPERLIPIDEMIA: ICD-10-CM

## 2017-11-22 DIAGNOSIS — G89.29 CHRONIC LOW BACK PAIN, UNSPECIFIED BACK PAIN LATERALITY, WITH SCIATICA PRESENCE UNSPECIFIED: ICD-10-CM

## 2017-11-22 DIAGNOSIS — I10 ESSENTIAL HYPERTENSION: ICD-10-CM

## 2017-11-22 PROCEDURE — 99214 OFFICE O/P EST MOD 30 MIN: CPT | Performed by: NURSE PRACTITIONER

## 2017-11-22 RX ORDER — ZOLPIDEM TARTRATE 10 MG/1
TABLET ORAL
Qty: 30 TAB | Refills: 2 | Status: SHIPPED
Start: 2017-11-22 | End: 2018-02-12 | Stop reason: SDUPTHER

## 2017-11-22 NOTE — ASSESSMENT & PLAN NOTE
Chronic issue. Denies chest pain or leg swelling. Good exercise tolerance on her bicycle. Working on weight loss. Continues on lisinopril and hydrochlorothiazide. Blood pressures at home are consistently below 120/80.

## 2017-11-22 NOTE — ASSESSMENT & PLAN NOTE
Chronic issue. Doing well with generic Crestor. Denies any chronic myalgias from Crestor. Not having any chest pain. Riding her bike for exercise with good exercise tolerance. She'll be due for lab work in January.

## 2017-11-22 NOTE — PROGRESS NOTES
"Chief Complaint   Patient presents with   • Follow-Up       HPI  Fadumo is a 61-year-old established female here to follow-up on chronic issues:  Hyperlipemia  Chronic issue. Doing well with generic Crestor. Denies any chronic myalgias from Crestor. Not having any chest pain. Riding her bike for exercise with good exercise tolerance. She'll be due for lab work in January.    Essential hypertension  Chronic issue. Denies chest pain or leg swelling. Good exercise tolerance on her bicycle. Working on weight loss. Continues on lisinopril and hydrochlorothiazide. Blood pressures at home are consistently below 120/80.    Low back pain  Chronic issue. Pain is well controlled taking occasional Tylenol 3 or tramadol. She takes meloxicam 50 mg every day. Still suffers from low back and bilateral knee pain. She had still lose weight and get more in shape to help with her multiple joint aches and pains, has no desire to see pain management.    Elevated hemoglobin A1c:  No definitive diagnosis of diabetes although her A1c has been as high as 6.4, last year. She started riding her bike 4-5 days per week and more strictly limiting her carbohydrate intake, get her A1c down to 5.8 in June of this year. She'll be due for lab work again in January.    Past medical, surgical, family, and social history is reviewed and updated in Epic chart by me today.   Medications and allergies reviewed and updated in Epic chart by me today.     ROS:   As documented in history of present illness above    Exam:  Blood pressure 126/82, pulse (!) 104, temperature 37.4 °C (99.3 °F), resp. rate 16, height 1.638 m (5' 4.5\"), weight 94.8 kg (209 lb), last menstrual period 09/10/2011, SpO2 95 %.  Constitutional: Alert, no distress, plus 3 vital signs  Skin:  Warm, dry, no rashes invisible areas  Eye: Equal, round and reactive, conjunctiva clear  ENMT: Lips without lesions, good dentition, oropharynx clear    Neck: Trachea midline, no masses, no " thyromegaly  Respiratory: Unlabored respiration, lungs clear to auscultation, no wheezes, no rhonchi  Cardiovascular: Normal rate and rhythm, no murmur, no edema  Abdomen: Soft, nontender, no masses or hepatosplenomegaly  Psych: Alert, pleasant, well-groomed, normal affect    A/P:  1. Other hyperlipidemia  -continue statin.  Check labs.    - LIPID PROFILE; Future  - CBC WITH DIFFERENTIAL; Future  - COMP METABOLIC PANEL; Future  - HEMOGLOBIN A1C; Future  - TSH; Future    2. Elevated hemoglobin A1c  -Encouraged her to continue cycling, limiting her portions, avoiding white carbohydrates and recheck labs in late January.  - LIPID PROFILE; Future  - CBC WITH DIFFERENTIAL; Future  - COMP METABOLIC PANEL; Future  - HEMOGLOBIN A1C; Future  - TSH; Future    3. Essential hypertension  -Stable. Continue same.  - LIPID PROFILE; Future  - CBC WITH DIFFERENTIAL; Future  - COMP METABOLIC PANEL; Future  - HEMOGLOBIN A1C; Future  - TSH; Future    4. Insomnia, unspecified type  -Stable. Continue same.  - zolpidem (AMBIEN) 10 MG Tab; TAKE ONE TABLET BY MOUTH ONCE DAILY AT BEDTIME AS NEEDED FOR SLEEP  Dispense: 30 Tab; Refill: 2    5. Chronic low back pain, unspecified back pain laterality, with sciatica presence unspecified  -Stable with current as needed uses of tramadol and Tylenol with Codeine. She understands side effects such as constipation, sedation, itching and dependence.

## 2017-11-27 DIAGNOSIS — I10 ESSENTIAL HYPERTENSION: ICD-10-CM

## 2017-11-27 DIAGNOSIS — R60.0 BILATERAL EDEMA OF LOWER EXTREMITY: ICD-10-CM

## 2017-11-27 RX ORDER — HYDROCHLOROTHIAZIDE 25 MG/1
TABLET ORAL
Qty: 90 TAB | Refills: 3 | Status: SHIPPED | OUTPATIENT
Start: 2017-11-27 | End: 2019-05-13 | Stop reason: SDUPTHER

## 2017-11-27 NOTE — TELEPHONE ENCOUNTER
Was the patient seen in the last year in this department? Yes     Does patient have an active prescription for medications requested? No     Received Request Via: Pharmacy     Last visit:11/22/17

## 2018-02-12 DIAGNOSIS — G47.00 INSOMNIA, UNSPECIFIED TYPE: ICD-10-CM

## 2018-02-12 DIAGNOSIS — F41.9 ANXIETY: ICD-10-CM

## 2018-02-12 DIAGNOSIS — M25.50 MULTIPLE JOINT PAIN: ICD-10-CM

## 2018-02-12 RX ORDER — ALPRAZOLAM 0.5 MG/1
TABLET ORAL
Qty: 30 TAB | Refills: 0 | Status: SHIPPED
Start: 2018-02-12 | End: 2018-03-12 | Stop reason: SDUPTHER

## 2018-02-12 RX ORDER — ZOLPIDEM TARTRATE 10 MG/1
TABLET ORAL
Qty: 30 TAB | Refills: 2 | Status: SHIPPED
Start: 2018-02-12 | End: 2018-03-12

## 2018-02-12 NOTE — TELEPHONE ENCOUNTER
Was the patient seen in the last year in this department? Yes   refill 1/11/18  Does patient have an active prescription for medications requested? No     Received Request Via: Patient

## 2018-02-12 NOTE — TELEPHONE ENCOUNTER
Was the patient seen in the last year in this department? Yes  refill for both 1/11/18    Does patient have an active prescription for medications requested? No     Received Request Via: Patient

## 2018-02-27 ENCOUNTER — OFFICE VISIT (OUTPATIENT)
Dept: MEDICAL GROUP | Facility: LAB | Age: 63
End: 2018-02-27
Payer: COMMERCIAL

## 2018-02-27 ENCOUNTER — HOSPITAL ENCOUNTER (OUTPATIENT)
Facility: MEDICAL CENTER | Age: 63
End: 2018-02-27
Attending: NURSE PRACTITIONER
Payer: COMMERCIAL

## 2018-02-27 VITALS
TEMPERATURE: 98.5 F | DIASTOLIC BLOOD PRESSURE: 78 MMHG | BODY MASS INDEX: 35.65 KG/M2 | WEIGHT: 214 LBS | SYSTOLIC BLOOD PRESSURE: 122 MMHG | OXYGEN SATURATION: 95 % | HEART RATE: 102 BPM | HEIGHT: 65 IN | RESPIRATION RATE: 12 BRPM

## 2018-02-27 DIAGNOSIS — M54.5 LOW BACK PAIN, UNSPECIFIED BACK PAIN LATERALITY, UNSPECIFIED CHRONICITY, WITH SCIATICA PRESENCE UNSPECIFIED: ICD-10-CM

## 2018-02-27 DIAGNOSIS — E66.9 OBESITY (BMI 35.0-39.9 WITHOUT COMORBIDITY): ICD-10-CM

## 2018-02-27 DIAGNOSIS — Z79.891 CHRONIC USE OF OPIATE DRUGS THERAPEUTIC PURPOSES: ICD-10-CM

## 2018-02-27 DIAGNOSIS — Z90.710 HISTORY OF HYSTERECTOMY: ICD-10-CM

## 2018-02-27 DIAGNOSIS — R31.9 HEMATURIA, UNSPECIFIED TYPE: ICD-10-CM

## 2018-02-27 DIAGNOSIS — R31.29 MICROSCOPIC HEMATURIA: ICD-10-CM

## 2018-02-27 LAB
APPEARANCE UR: NORMAL
BILIRUB UR STRIP-MCNC: NORMAL MG/DL
COLOR UR AUTO: NORMAL
GLUCOSE UR STRIP.AUTO-MCNC: NORMAL MG/DL
KETONES UR STRIP.AUTO-MCNC: NORMAL MG/DL
LEUKOCYTE ESTERASE UR QL STRIP.AUTO: NORMAL
NITRITE UR QL STRIP.AUTO: NORMAL
PH UR STRIP.AUTO: 5.5 [PH] (ref 5–8)
PROT UR QL STRIP: NORMAL MG/DL
RBC UR QL AUTO: NORMAL
SP GR UR STRIP.AUTO: 1.01
UROBILINOGEN UR STRIP-MCNC: NORMAL MG/DL

## 2018-02-27 PROCEDURE — 87086 URINE CULTURE/COLONY COUNT: CPT

## 2018-02-27 PROCEDURE — 81002 URINALYSIS NONAUTO W/O SCOPE: CPT | Performed by: NURSE PRACTITIONER

## 2018-02-27 PROCEDURE — 99214 OFFICE O/P EST MOD 30 MIN: CPT | Performed by: NURSE PRACTITIONER

## 2018-02-27 RX ORDER — TRAMADOL HYDROCHLORIDE 50 MG/1
50 TABLET ORAL EVERY 6 HOURS PRN
Qty: 90 TAB | Refills: 2 | Status: SHIPPED
Start: 2018-02-27 | End: 2018-03-04 | Stop reason: SDUPTHER

## 2018-02-27 NOTE — PROGRESS NOTES
Chief Complaint   Patient presents with   • Blood in Urine     pt states 6 days ago she had blood in her urine, no other noted symptoms       HPI  Fadumo is a 62-year-old established female here with complaint new onset saeid hematuria once, occurring last Thursday after eating mexican food and having a beer -was very disturbed because she states that she saw a blood clot after seeing pink colored urine.  Increased her water intake that night and blood has not reappeared since.  No abnormal activities on Thursday.  No dysuria, hesitancy, urgency but has frequency.  No hx of kidney stones. She is prediabetic. Does not have a family history of bladder or kidney cancer that she is aware of. Denies abdominal or flank pain.   She had a hysterectomy numerous years ago but retains her cervix.  Quit smoking 5 years ago.      She suffers from chronic pain in multiple joints including her knees, back, hips and shoulders. She takes tramadol, alternating with Tylenol No. 3 for her pain and is requesting refills of her tramadol. She also takes 15 mg of meloxicam daily for arthritis, denying any GI side effects of this.     She  reports that she drinks about 0.5 oz of alcohol per week .  She  reports that she does not use drugs.    Consequences of Chronic Opiate therapy:  (5 A's)  Analgesia: Compared to no treatment or prior treatment, pain is currently worsened  Activity: not changed  Adverse Events: She denies constipation, dry mouth, itchy skin, nausea and sedation  Aberrant Behaviors: She reports she is taking medication as prescribed and is not veering from agreed treatment regimen. There have been no inappropriate refills or lost/stolen meds reported.   Affect/Mood: Pain is not impacting patient's mood.  Patient denies depression/anxiety.    Nonnarcotic treatments that are being used: NSAIDs/GAN-2.   Treatment goals discussed.    Opioid Risk Score: No Value exists for the : RNW#180    Interpretation of Opioid Risk Score  "  Score 0-3 = Low risk of abuse. Do UDS at least once per year.  Score 4-7 = Moderate risk of abuse. Do UDS 1-4 times per year.  Score 8+ = High risk of abuse. Refer to specialist.    No order of CONTROLLED SUBSTANCE TREATMENT AGREEMENT is found.     UDS Summary     Patient has no health maintenance due at this time        Most recent UDS reviewed today and is consistent with prescribed medications.    I have reviewed the medical records, the Prescription Monitoring Program and I have determined that controlled substance treatment is medically indicated.        Past medical, surgical, family, and social history is reviewed and updated in Epic chart by me today.   Medications and allergies reviewed and updated in Epic chart by me today.     ROS:   As documented in history of present illness above    Exam:  Blood pressure 122/78, pulse (!) 102, temperature 36.9 °C (98.5 °F), resp. rate 12, height 1.638 m (5' 4.5\"), weight 97.1 kg (214 lb), last menstrual period 09/10/2011, SpO2 95 %.  Constitutional: Alert, no distress, plus 3 vital signs  Skin:  Warm, dry, no rashes invisible areas  Eye: Equal, round and reactive, conjunctiva clear  ENMT: Lips without lesions, good dentition, oropharynx clear    Neck: Trachea midline  Respiratory: Unlabored respiration.  Cardiovascular: Normal rate and rhythm.  Abdomen: Soft, nontender, specifically no suprapubic or CVA tenderness  Psych: Alert, pleasant, well-groomed, normal affect    A/P:  1. Hematuria, unspecified type  -Urinalysis shows +1 blood and +1 leukocytes, negative nitrates. Urine will be sent for a culture and if negative, she needs to move forward with a renal ultrasound. Renal ultrasound is negative, she'll be referred to urology for possible cystoscopy.  - US-RENAL    2. Microscopic hematuria  - URINE CULTURE(NEW); Future  - POCT Urinalysis    3. History of hysterectomy  -Retains a cervix. Reminded her that she is due for a gynecological exam and she will follow-up " with me at the end of next week for this.    4. Low back pain, unspecified back pain laterality, unspecified chronicity, with sciatica presence unspecified  -Complaint this is worsening. We discussed her weight. I encouraged her to put in more effort towards trunk strengthening.  - tramadol (ULTRAM) 50 MG Tab; Take 1 Tab by mouth every 6 hours as needed for up to 30 days.  Dispense: 90 Tab; Refill: 2    5. Chronic use of opiate drugs therapeutic purposes  Overall impression that this patient is benefiting from opioid therapy and that the benefits outweigh the risks of continued use. yes   - Controlled Substance Use Agreement updated today  - Urine drug screen is up-to-date.  - Additional lab: CMP to assess liver and renal function - UTD   - Rx refill prescriptions are done for 3 months, No early refills.   - Referral to pain management no    - CONSENT FOR OPIATE PRESCRIPTION    6. Obesity (BMI 35.0-39.9 without comorbidity)  - Patient identified as having weight management issue.  Appropriate orders and counseling given.

## 2018-03-02 ENCOUNTER — HOSPITAL ENCOUNTER (OUTPATIENT)
Dept: LAB | Facility: MEDICAL CENTER | Age: 63
End: 2018-03-02
Attending: NURSE PRACTITIONER
Payer: COMMERCIAL

## 2018-03-02 DIAGNOSIS — I10 ESSENTIAL HYPERTENSION: ICD-10-CM

## 2018-03-02 DIAGNOSIS — E78.49 OTHER HYPERLIPIDEMIA: ICD-10-CM

## 2018-03-02 DIAGNOSIS — R73.09 ELEVATED HEMOGLOBIN A1C: ICD-10-CM

## 2018-03-02 LAB
ALBUMIN SERPL BCP-MCNC: 4.5 G/DL (ref 3.2–4.9)
ALBUMIN/GLOB SERPL: 1.6 G/DL
ALP SERPL-CCNC: 47 U/L (ref 30–99)
ALT SERPL-CCNC: 15 U/L (ref 2–50)
ANION GAP SERPL CALC-SCNC: 9 MMOL/L (ref 0–11.9)
AST SERPL-CCNC: 18 U/L (ref 12–45)
BACTERIA UR CULT: NORMAL
BASOPHILS # BLD AUTO: 0.6 % (ref 0–1.8)
BASOPHILS # BLD: 0.03 K/UL (ref 0–0.12)
BILIRUB SERPL-MCNC: 0.5 MG/DL (ref 0.1–1.5)
BUN SERPL-MCNC: 25 MG/DL (ref 8–22)
CALCIUM SERPL-MCNC: 9 MG/DL (ref 8.5–10.5)
CHLORIDE SERPL-SCNC: 102 MMOL/L (ref 96–112)
CHOLEST SERPL-MCNC: 154 MG/DL (ref 100–199)
CO2 SERPL-SCNC: 26 MMOL/L (ref 20–33)
CREAT SERPL-MCNC: 0.97 MG/DL (ref 0.5–1.4)
EOSINOPHIL # BLD AUTO: 0.16 K/UL (ref 0–0.51)
EOSINOPHIL NFR BLD: 3.1 % (ref 0–6.9)
ERYTHROCYTE [DISTWIDTH] IN BLOOD BY AUTOMATED COUNT: 48.5 FL (ref 35.9–50)
EST. AVERAGE GLUCOSE BLD GHB EST-MCNC: 126 MG/DL
GLOBULIN SER CALC-MCNC: 2.8 G/DL (ref 1.9–3.5)
GLUCOSE SERPL-MCNC: 117 MG/DL (ref 65–99)
HBA1C MFR BLD: 6 % (ref 0–5.6)
HCT VFR BLD AUTO: 36.6 % (ref 37–47)
HDLC SERPL-MCNC: 50 MG/DL
HGB BLD-MCNC: 11.5 G/DL (ref 12–16)
IMM GRANULOCYTES # BLD AUTO: 0.02 K/UL (ref 0–0.11)
IMM GRANULOCYTES NFR BLD AUTO: 0.4 % (ref 0–0.9)
LDLC SERPL CALC-MCNC: 70 MG/DL
LYMPHOCYTES # BLD AUTO: 1.8 K/UL (ref 1–4.8)
LYMPHOCYTES NFR BLD: 34.5 % (ref 22–41)
MCH RBC QN AUTO: 27.6 PG (ref 27–33)
MCHC RBC AUTO-ENTMCNC: 31.4 G/DL (ref 33.6–35)
MCV RBC AUTO: 88 FL (ref 81.4–97.8)
MONOCYTES # BLD AUTO: 0.38 K/UL (ref 0–0.85)
MONOCYTES NFR BLD AUTO: 7.3 % (ref 0–13.4)
NEUTROPHILS # BLD AUTO: 2.83 K/UL (ref 2–7.15)
NEUTROPHILS NFR BLD: 54.1 % (ref 44–72)
NRBC # BLD AUTO: 0 K/UL
NRBC BLD-RTO: 0 /100 WBC
PLATELET # BLD AUTO: 213 K/UL (ref 164–446)
PMV BLD AUTO: 11.6 FL (ref 9–12.9)
POTASSIUM SERPL-SCNC: 4 MMOL/L (ref 3.6–5.5)
PROT SERPL-MCNC: 7.3 G/DL (ref 6–8.2)
RBC # BLD AUTO: 4.16 M/UL (ref 4.2–5.4)
SIGNIFICANT IND 70042: NORMAL
SITE SITE: NORMAL
SODIUM SERPL-SCNC: 137 MMOL/L (ref 135–145)
SOURCE SOURCE: NORMAL
TRIGL SERPL-MCNC: 170 MG/DL (ref 0–149)
TSH SERPL DL<=0.005 MIU/L-ACNC: 1.55 UIU/ML (ref 0.38–5.33)
WBC # BLD AUTO: 5.2 K/UL (ref 4.8–10.8)

## 2018-03-02 PROCEDURE — 80061 LIPID PANEL: CPT

## 2018-03-02 PROCEDURE — 36415 COLL VENOUS BLD VENIPUNCTURE: CPT

## 2018-03-02 PROCEDURE — 84443 ASSAY THYROID STIM HORMONE: CPT

## 2018-03-02 PROCEDURE — 85025 COMPLETE CBC W/AUTO DIFF WBC: CPT

## 2018-03-02 PROCEDURE — 80053 COMPREHEN METABOLIC PANEL: CPT

## 2018-03-02 PROCEDURE — 83036 HEMOGLOBIN GLYCOSYLATED A1C: CPT

## 2018-03-04 DIAGNOSIS — M54.5 LOW BACK PAIN, UNSPECIFIED BACK PAIN LATERALITY, UNSPECIFIED CHRONICITY, WITH SCIATICA PRESENCE UNSPECIFIED: ICD-10-CM

## 2018-03-05 RX ORDER — TRAMADOL HYDROCHLORIDE 50 MG/1
TABLET ORAL
Qty: 90 TAB | Refills: 0 | Status: SHIPPED
Start: 2018-03-05 | End: 2018-04-04

## 2018-03-05 NOTE — TELEPHONE ENCOUNTER
Was the patient seen in the last year in this department? Yes lov 2/27/18  1/11/18    Does patient have an active prescription for medications requested? No     Received Request Via: Pharmacy

## 2018-03-07 ENCOUNTER — TELEPHONE (OUTPATIENT)
Dept: MEDICAL GROUP | Facility: LAB | Age: 63
End: 2018-03-07

## 2018-03-07 NOTE — TELEPHONE ENCOUNTER
ESTABLISHED PATIENT PRE-VISIT PLANNING     Note: Patient will not be contacted if there is no indication to call.     1.  Reviewed notes from the last few office visits within the medical group: Yes    2.  If any orders were placed at last visit or intended to be done for this visit (i.e. 6 mos follow-up), do we have Results/Consult Notes?        •  Labs - Labs were not ordered at last office visit.       •  Imaging - Imaging was not ordered at last office visit.       •  Referrals - Referral ordered, patient has NOT been seen.    3. Is this appointment scheduled as a Hospital Follow-Up? No    4.  Immunizations were updated in [facility] using WebIZ?: Yes       •  Web Iz Recommendations: HEPATITIS A , HEPATITIS B, MMR , TD and ZOSTAVAX (Shingles)    5.  Patient is due for the following Health Maintenance Topics:   Health Maintenance Due   Topic Date Due   • MAMMOGRAM  05/30/2014   • IMM ZOSTER VACCINE  12/29/2017       - Patient has completed FLU and TDAP Immunization(s) per WebIZ. Chart has been updated.    6.  MDX printed and highlighted for Provider? N\A    7.  Patient was NOT informed to arrive 15 min prior to their scheduled appointment and bring in their medication bottles.

## 2018-03-08 ENCOUNTER — HOSPITAL ENCOUNTER (OUTPATIENT)
Facility: MEDICAL CENTER | Age: 63
End: 2018-03-08
Attending: NURSE PRACTITIONER
Payer: COMMERCIAL

## 2018-03-08 ENCOUNTER — OFFICE VISIT (OUTPATIENT)
Dept: MEDICAL GROUP | Facility: LAB | Age: 63
End: 2018-03-08
Payer: COMMERCIAL

## 2018-03-08 VITALS
TEMPERATURE: 98.2 F | HEIGHT: 64 IN | SYSTOLIC BLOOD PRESSURE: 120 MMHG | HEART RATE: 94 BPM | WEIGHT: 213 LBS | DIASTOLIC BLOOD PRESSURE: 70 MMHG | RESPIRATION RATE: 16 BRPM | BODY MASS INDEX: 36.37 KG/M2 | OXYGEN SATURATION: 96 %

## 2018-03-08 DIAGNOSIS — I49.49 EXTRASYSTOLES: ICD-10-CM

## 2018-03-08 DIAGNOSIS — R39.9 UTI SYMPTOMS: ICD-10-CM

## 2018-03-08 DIAGNOSIS — R21 RASH: ICD-10-CM

## 2018-03-08 DIAGNOSIS — Z12.31 ENCOUNTER FOR SCREENING MAMMOGRAM FOR BREAST CANCER: ICD-10-CM

## 2018-03-08 DIAGNOSIS — Z01.419 ENCOUNTER FOR GYNECOLOGICAL EXAMINATION: ICD-10-CM

## 2018-03-08 DIAGNOSIS — R31.9 HEMATURIA, UNSPECIFIED TYPE: ICD-10-CM

## 2018-03-08 LAB
APPEARANCE UR: CLEAR
BILIRUB UR STRIP-MCNC: NORMAL MG/DL
COLOR UR AUTO: YELLOW
CYTOLOGY REG CYTOL: NORMAL
GLUCOSE UR STRIP.AUTO-MCNC: NORMAL MG/DL
KETONES UR STRIP.AUTO-MCNC: NORMAL MG/DL
LEUKOCYTE ESTERASE UR QL STRIP.AUTO: NORMAL
NITRITE UR QL STRIP.AUTO: NORMAL
PH UR STRIP.AUTO: 6.5 [PH] (ref 5–8)
PROT UR QL STRIP: NORMAL MG/DL
RBC UR QL AUTO: NORMAL
SP GR UR STRIP.AUTO: 1.02
UROBILINOGEN UR STRIP-MCNC: 0.2 MG/DL

## 2018-03-08 PROCEDURE — 99396 PREV VISIT EST AGE 40-64: CPT | Performed by: NURSE PRACTITIONER

## 2018-03-08 PROCEDURE — 81002 URINALYSIS NONAUTO W/O SCOPE: CPT | Performed by: NURSE PRACTITIONER

## 2018-03-08 PROCEDURE — 88175 CYTOPATH C/V AUTO FLUID REDO: CPT

## 2018-03-08 PROCEDURE — 93000 ELECTROCARDIOGRAM COMPLETE: CPT | Performed by: NURSE PRACTITIONER

## 2018-03-08 RX ORDER — TRIAMCINOLONE ACETONIDE 1 MG/G
1 OINTMENT TOPICAL 2 TIMES DAILY
Qty: 1 TUBE | Refills: 0 | Status: SHIPPED | OUTPATIENT
Start: 2018-03-08 | End: 2018-05-02

## 2018-03-08 NOTE — PROGRESS NOTES
Chief Complaint   Patient presents with   • Annual Exam     PAP       HPI:  Fadumo is a 62 y.o.  female who presents for annual exam.  Physically having several issues  - Was seen 2/27 for new onset hematuria - started the Thursday before our visit.  Has occurred twice since.  Urine culture returned negative for infection. She was never called for a renal ultrasound. No history of the same. Has never seen neurology for any other reasons. He is not having suprapubic pain. Former smoker, quit in 2011.   Has also been having some upper abdominal pain for the past several weeks, described as achy and burning, improved with eating. No black or bloody stools.  Regarding her health maintenance:   Hysterectomy - done 2011  Last Mammo:overdue  Last colonoscopy: Done at age 54, repeat 10 years  Bone density test:N\A   Last Lab: Completed prior to arrival  Last Td:current   Influenza vaccination:current   Pneumococcal vaccination:not applicable   Zostavax:not applicable   Hx STD''s: no   Regular exercise: yes  Rash to abdomen x 1.5 mo . Slight improvement with OTC cortisone.  Itchy / nonpainful.      meds:   Current Outpatient Prescriptions   Medication Sig Dispense Refill   • Aspirin-Acetaminophen-Caffeine (EXCEDRIN MIGRAINE PO) Take  by mouth.     • tramadol (ULTRAM) 50 MG Tab TAKE ONE TABLET BY MOUTH EVERY 6 HOURS AS NEEDED FOR 30 DAYS 90 Tab 0   • zolpidem (AMBIEN) 10 MG Tab TAKE ONE TABLET BY MOUTH ONCE DAILY AT BEDTIME AS NEEDED FOR SLEEP 30 Tab 2   • acetaminophen-codeine #3 (TYLENOL #3) 300-30 MG Tab TAKE ONE TO TWO TABLETS BY MOUTH EVERY 8 HOURS AS NEEDED FOR  UNRELIEVED  PAIN  BY  TRAMADOL.  DO  NOT  TAKE  WITHIN  4  HOURS  OF  TRAMADO 30 Tab 2   • ALPRAZolam (XANAX) 0.5 MG Tab TAKE ONE TABLET BY MOUTH AT BEDTIME AS NEEDED FOR SLEEP 30 Tab 0   • hydrochlorothiazide (HYDRODIURIL) 25 MG Tab TAKE ONE TABLET BY MOUTH ONCE DAILY FOR  FLUID  RETENTION 90 Tab 3   • lisinopril (PRINIVIL) 20 MG Tab TAKE ONE TABLET BY MOUTH  "ONCE DAILY 90 Tab 2   • rosuvastatin (CRESTOR) 20 MG Tab TAKE ONE TABLET BY MOUTH ONCE DAILY IN THE EVENING 90 Tab 3   • meloxicam (MOBIC) 15 MG tablet Take 1 Tab by mouth every day. 90 Tab 3   • coenzyme Q-10 30 MG capsule Take 30 mg by mouth every day.     • Cholecalciferol (VITAMIN D PO) Take 2,000 Units by mouth every day.     • ibuprofen (MOTRIN) 200 MG TABS Take 400 mg by mouth as needed.     • Dihydroxyaluminum Sod Carb (ACID RELIEF PO) Take  by mouth every day.       No current facility-administered medications for this visit.        Allergies: No Known Allergies    family:   No family history on file.    social hx:   Social History     Social History   • Marital status:      Spouse name: N/A   • Number of children: N/A   • Years of education: N/A     Occupational History   • Not on file.     Social History Main Topics   • Smoking status: Former Smoker     Packs/day: 0.50     Types: Cigarettes     Quit date: 2/1/2011   • Smokeless tobacco: Never Used   • Alcohol use 0.5 oz/week     1 drink(s) per week   • Drug use: No   • Sexual activity: Not on file     Other Topics Concern   • Not on file     Social History Narrative   • No narrative on file       ROS:  No fever, chills, sweats.   No polydipsia, polyuria, temperature intolerance, significant weight changes   No visual changes, blurred vision.  No chest pain, palpitations, peripheral swelling   No chronic cough, shortness of breath, dyspnea with exertion.   No dysphagia, odynophagia, black or bloody stools.   Denies constipation or diarrhea  No dysuria, incontinence. Denies nocturia  No rash, pruritis, pigment changes.   No focal weakness, syncope, headache, confusion, persistent numbness.     PHYSICAL EXAMINATION:  Blood pressure 120/70, pulse 94, temperature 36.8 °C (98.2 °F), resp. rate 16, height 1.626 m (5' 4\"), weight 96.6 kg (213 lb), last menstrual period 09/10/2011, SpO2 96 %.  General appearance:healthy, well developed, well " nourished  Psych: alert, no distress, cooperative  Eyes: EOM's normal, pupils equal, round, reactive to light  ENT: Ears: external ears normal to inspection and palpation, TM's clear, Nose/Sinuses: nose shows no deformity, asymmetry, or inflammation  Neck: no asymmetry, masses, or scars, no adenopathy, thyroid normal to palpation  Lungs:chest symmetric with normal A/P diameter, no chest deformities noted, normal respiratory rate and rhythm  Cardiovascular:regular rate. She has an extrasystole every 5-9 beats.  Breasts: normal in size and symmetry, skin normal, physiologic fibronodularity  Abdomen: umbilicus normal, no masses palpable, no organomegaly  Musculoskeletal:ROM of all joints is normal, no evidence of joint instability  Lymphatic: None significantly enlarged  Skin: She has a scaling, dry appearing rash to the center of her abdomen that measures 4 x 5 cm without vesicles or open lesions.  Neuro: mental status intact, cranial nerves 2-12 intact  Pelvic: external genitalia normal, cervix normal in appearance, uterus is surgically absent, adnexa without tenderness, Vaginal mucosa normal. Urethral opening does not appear irritated.    ASSESSMENT/PLAN:  1.annual physical exam: HCM:  Pap and breast exams done.  BSE technique reviewed and patient encouraged to perform self-exam monthly.   Encourage monthly self breast exam  Encourage daily exercise for at least 30 minutes  Recommend mammogram   Recommend 1500 mg Calcium with 600 units vit d daily.    Follow up one year for annual PAP  Reviewed all blood work with her today.  2.  Microcytic hematuria with occasional saeid hematuria: She was given a report of her ultrasound order and she plans on calling today to get this scheduled. She was given a referral to urology. She will stop meloxicam as well as frequent intake of Excedrin Migraine for 2 weeks and recheck her urine sample. She understands the importance of following through with renal ultrasound and seeing  the urologist due to potential risk of renal cancer/bladder cancer.  3.  Upper abdominal pain: Suspect gastritis. She'll stop all NSAIDs for 2 weeks and start over-the-counter Prilosec 20 mg daily for 2 weeks, letting me know how she is feeling every week. Discussed ER with onset black or bloody stools.  4.  Abdominal rash: Trial of triamcinolone twice daily for 1-2 weeks, letting me know if this is not resolving.  5.  Extrasystoles: EKG done which shows a rate of 88, normal sinus rhythm but multiform PVCs. Encouraged her to cut back on her coffee, she is drinking anywhere from 2-4 cups per day and will recheck her EKG when she comes to see me after the urologist.    Encouraged her to come back and see me one week after she sees the urologist.

## 2018-03-12 DIAGNOSIS — M54.5 LOW BACK PAIN, UNSPECIFIED BACK PAIN LATERALITY, UNSPECIFIED CHRONICITY, WITH SCIATICA PRESENCE UNSPECIFIED: ICD-10-CM

## 2018-03-12 DIAGNOSIS — F41.9 ANXIETY: ICD-10-CM

## 2018-03-12 RX ORDER — ALPRAZOLAM 0.5 MG/1
TABLET ORAL
Qty: 30 TAB | Refills: 0 | Status: SHIPPED
Start: 2018-03-12 | End: 2018-04-09 | Stop reason: SDUPTHER

## 2018-03-12 NOTE — TELEPHONE ENCOUNTER
Was the patient seen in the last year in this department? Yes  Last Fill 2/12/18 #30    Does patient have an active prescription for medications requested? No     Received Request Via: Pharmacy

## 2018-03-15 ENCOUNTER — HOSPITAL ENCOUNTER (OUTPATIENT)
Dept: LAB | Facility: MEDICAL CENTER | Age: 63
End: 2018-03-15
Attending: UROLOGY
Payer: COMMERCIAL

## 2018-03-15 LAB
AMBIGUOUS DTTM AMBI4: NORMAL
CYTOLOGY REG CYTOL: NORMAL

## 2018-03-15 PROCEDURE — 87086 URINE CULTURE/COLONY COUNT: CPT

## 2018-03-15 PROCEDURE — 88112 CYTOPATH CELL ENHANCE TECH: CPT

## 2018-03-15 PROCEDURE — 81001 URINALYSIS AUTO W/SCOPE: CPT

## 2018-03-16 LAB
AMBIGUOUS DTTM AMBI4: NORMAL
APPEARANCE UR: CLEAR
BACTERIA #/AREA URNS HPF: NEGATIVE /HPF
BILIRUB UR QL STRIP.AUTO: NEGATIVE
COLOR UR: YELLOW
EPI CELLS #/AREA URNS HPF: NEGATIVE /HPF
GLUCOSE UR STRIP.AUTO-MCNC: NEGATIVE MG/DL
HYALINE CASTS #/AREA URNS LPF: ABNORMAL /LPF
KETONES UR STRIP.AUTO-MCNC: NEGATIVE MG/DL
LEUKOCYTE ESTERASE UR QL STRIP.AUTO: NEGATIVE
MICRO URNS: ABNORMAL
NITRITE UR QL STRIP.AUTO: NEGATIVE
PH UR STRIP.AUTO: 6 [PH]
PROT UR QL STRIP: NEGATIVE MG/DL
RBC # URNS HPF: ABNORMAL /HPF
RBC UR QL AUTO: ABNORMAL
SIGNIFICANT IND 70042: NORMAL
SITE SITE: NORMAL
SOURCE SOURCE: NORMAL
SP GR UR STRIP.AUTO: 1.01
UROBILINOGEN UR STRIP.AUTO-MCNC: 0.2 MG/DL
WBC #/AREA URNS HPF: ABNORMAL /HPF

## 2018-03-18 LAB
BACTERIA UR CULT: NORMAL
SIGNIFICANT IND 70042: NORMAL
SITE SITE: NORMAL
SOURCE SOURCE: NORMAL

## 2018-03-21 ENCOUNTER — HOSPITAL ENCOUNTER (OUTPATIENT)
Dept: RADIOLOGY | Facility: MEDICAL CENTER | Age: 63
End: 2018-03-21
Attending: NURSE PRACTITIONER
Payer: COMMERCIAL

## 2018-03-21 DIAGNOSIS — Z12.31 ENCOUNTER FOR SCREENING MAMMOGRAM FOR BREAST CANCER: ICD-10-CM

## 2018-03-21 PROCEDURE — 77067 SCR MAMMO BI INCL CAD: CPT

## 2018-04-09 ENCOUNTER — HOSPITAL ENCOUNTER (OUTPATIENT)
Dept: RADIOLOGY | Facility: MEDICAL CENTER | Age: 63
End: 2018-04-09
Attending: NURSE PRACTITIONER
Payer: COMMERCIAL

## 2018-04-09 PROCEDURE — 76775 US EXAM ABDO BACK WALL LIM: CPT

## 2018-04-13 ENCOUNTER — HOSPITAL ENCOUNTER (OUTPATIENT)
Dept: RADIOLOGY | Facility: MEDICAL CENTER | Age: 63
End: 2018-04-13
Attending: UROLOGY
Payer: COMMERCIAL

## 2018-04-13 DIAGNOSIS — R31.0 GROSS HEMATURIA: ICD-10-CM

## 2018-04-13 PROCEDURE — 74178 CT ABD&PLV WO CNTR FLWD CNTR: CPT

## 2018-04-13 PROCEDURE — 700117 HCHG RX CONTRAST REV CODE 255: Performed by: UROLOGY

## 2018-04-13 RX ADMIN — IOHEXOL 100 ML: 350 INJECTION, SOLUTION INTRAVENOUS at 10:30

## 2018-04-30 ENCOUNTER — HOSPITAL ENCOUNTER (OUTPATIENT)
Facility: MEDICAL CENTER | Age: 63
End: 2018-04-30
Attending: UROLOGY
Payer: COMMERCIAL

## 2018-04-30 PROCEDURE — 87086 URINE CULTURE/COLONY COUNT: CPT

## 2018-05-02 DIAGNOSIS — Z01.812 PRE-OPERATIVE LABORATORY EXAMINATION: ICD-10-CM

## 2018-05-02 DIAGNOSIS — Z01.810 PRE-OPERATIVE CARDIOVASCULAR EXAMINATION: ICD-10-CM

## 2018-05-02 LAB
ANION GAP SERPL CALC-SCNC: 13 MMOL/L (ref 0–11.9)
APPEARANCE UR: CLEAR
BACTERIA #/AREA URNS HPF: NEGATIVE /HPF
BILIRUB UR QL STRIP.AUTO: NEGATIVE
BUN SERPL-MCNC: 24 MG/DL (ref 8–22)
CALCIUM SERPL-MCNC: 10.1 MG/DL (ref 8.5–10.5)
CHLORIDE SERPL-SCNC: 98 MMOL/L (ref 96–112)
CO2 SERPL-SCNC: 25 MMOL/L (ref 20–33)
COLOR UR: YELLOW
CREAT SERPL-MCNC: 1.17 MG/DL (ref 0.5–1.4)
EKG IMPRESSION: NORMAL
EPI CELLS #/AREA URNS HPF: NEGATIVE /HPF
ERYTHROCYTE [DISTWIDTH] IN BLOOD BY AUTOMATED COUNT: 48.3 FL (ref 35.9–50)
GLUCOSE SERPL-MCNC: 156 MG/DL (ref 65–99)
GLUCOSE UR STRIP.AUTO-MCNC: NEGATIVE MG/DL
HCT VFR BLD AUTO: 38.1 % (ref 37–47)
HGB BLD-MCNC: 12.2 G/DL (ref 12–16)
HYALINE CASTS #/AREA URNS LPF: ABNORMAL /LPF
KETONES UR STRIP.AUTO-MCNC: 15 MG/DL
LEUKOCYTE ESTERASE UR QL STRIP.AUTO: ABNORMAL
MCH RBC QN AUTO: 27.9 PG (ref 27–33)
MCHC RBC AUTO-ENTMCNC: 32 G/DL (ref 33.6–35)
MCV RBC AUTO: 87.2 FL (ref 81.4–97.8)
MICRO URNS: ABNORMAL
NITRITE UR QL STRIP.AUTO: NEGATIVE
PH UR STRIP.AUTO: 5.5 [PH]
PLATELET # BLD AUTO: 241 K/UL (ref 164–446)
PMV BLD AUTO: 10.9 FL (ref 9–12.9)
POTASSIUM SERPL-SCNC: 3.9 MMOL/L (ref 3.6–5.5)
PROT UR QL STRIP: NEGATIVE MG/DL
RBC # BLD AUTO: 4.37 M/UL (ref 4.2–5.4)
RBC # URNS HPF: ABNORMAL /HPF
RBC UR QL AUTO: ABNORMAL
SODIUM SERPL-SCNC: 136 MMOL/L (ref 135–145)
SP GR UR STRIP.AUTO: 1.02
UROBILINOGEN UR STRIP.AUTO-MCNC: 0.2 MG/DL
WBC # BLD AUTO: 10 K/UL (ref 4.8–10.8)
WBC #/AREA URNS HPF: ABNORMAL /HPF

## 2018-05-02 PROCEDURE — 36415 COLL VENOUS BLD VENIPUNCTURE: CPT

## 2018-05-02 PROCEDURE — 85027 COMPLETE CBC AUTOMATED: CPT

## 2018-05-02 PROCEDURE — 87086 URINE CULTURE/COLONY COUNT: CPT

## 2018-05-02 PROCEDURE — 81001 URINALYSIS AUTO W/SCOPE: CPT

## 2018-05-02 PROCEDURE — 93005 ELECTROCARDIOGRAM TRACING: CPT

## 2018-05-02 PROCEDURE — 80048 BASIC METABOLIC PNL TOTAL CA: CPT

## 2018-05-02 PROCEDURE — 93010 ELECTROCARDIOGRAM REPORT: CPT | Performed by: INTERNAL MEDICINE

## 2018-05-02 RX ORDER — ZOLPIDEM TARTRATE 5 MG/1
5 TABLET ORAL NIGHTLY PRN
COMMUNITY
End: 2018-06-04 | Stop reason: SDUPTHER

## 2018-05-03 LAB
BACTERIA UR CULT: NORMAL
SIGNIFICANT IND 70042: NORMAL
SITE SITE: NORMAL
SOURCE SOURCE: NORMAL

## 2018-05-04 LAB
BACTERIA UR CULT: NORMAL
SIGNIFICANT IND 70042: NORMAL
SITE SITE: NORMAL
SOURCE SOURCE: NORMAL

## 2018-05-06 DIAGNOSIS — M25.50 MULTIPLE JOINT PAIN: ICD-10-CM

## 2018-05-06 DIAGNOSIS — F41.9 ANXIETY: ICD-10-CM

## 2018-05-07 RX ORDER — TRAMADOL HYDROCHLORIDE 50 MG/1
50 TABLET ORAL EVERY 6 HOURS PRN
Qty: 90 TAB | Refills: 0 | Status: SHIPPED
Start: 2018-05-07 | End: 2018-06-04 | Stop reason: SDUPTHER

## 2018-05-07 RX ORDER — ALPRAZOLAM 0.5 MG/1
0.5 TABLET ORAL NIGHTLY PRN
Qty: 30 TAB | Refills: 0 | Status: SHIPPED
Start: 2018-05-07 | End: 2018-06-04 | Stop reason: SDUPTHER

## 2018-05-07 NOTE — PROGRESS NOTES
Pharmacy Chemotherapy calculation:     Regimen: Mitomycin intravesical bladder instillation   Guideline for the management of nonmuscle invasive bladder cancer (stages Ta, T1, and Tis): 2007 update.   He MC, Peters SS, Jovon G, Caprice RS, Ortiz LONGORIAD, Fercho EC, Escalera JS Jr, Dorothy PF   J Urol. 2007;178(6):2314.     HT: 64in WT: 95.6kg BSA: 2.08m2   LABS: Not required     Mitomycin intravesical(bladder instillation) dispense 40mg/20ml sterile water to OR in cath tipped syringe for procedure on 5/8/18 pending intra-op MD decision  Fixed dose, no calculations required     China CalhounD

## 2018-05-07 NOTE — TELEPHONE ENCOUNTER
Was the patient seen in the last year in this department? Yes    Last Fill   Tramadol 4/9/18 #90  Alprazolam 4/9/18 #30  Does patient have an active prescription for medications requested? No     Received Request Via: Patient

## 2018-05-07 NOTE — PROGRESS NOTES
"Pharmacy chemotherapy verification:    Dx: Bladder tumor     Protocol: mitomycin for bladder irrigation  *Dosing Reference*   Regimen: Mitomycin intravesical bladder instillation   Guideline for the management of nonmuscle invasive bladder cancer (stages Ta, T1, and Tis): 2007 update.   Neri MC, Fran SS, Jovon G, Caprice RS, Ortiz HOEMR, Fercho EC, Escalera JS Jr, Dorothy PF   J Urol. 2007;178(6):2314.    Allergies: Patient has no known allergies.  Ht 1.626 m (5' 4\")   Wt 95.6 kg (210 lb 12.2 oz)   LMP 09/10/2011   BMI 36.18 kg/m²   Body surface area is 2.08 meters squared.    No labs required    Drug Order   (Drug name, dose, route, IV Fluid & volume, frequency, number of doses)     Medication = mitomycin   Base Dose= 40mg fixed dose   No calculation required   Final Dose = 40mg   Route = intravesical  Fluid & Volume = syringe 20mL SWIFI   Admin Duration = by MD at time of surgical procedure      Fixed dose no calculation required   By my signature below, I confirm this process was performed independently with the BSA and all final chemotherapy dosing calculations congruent. I have reviewed the above chemotherapy order and that my calculation of the final dose and BSA (when applicable) corroborate those calculations of the  pharmacist. Discrepancies of 5% or greater have been addressed and documented within the [facility] Progress notes.     Signature: HERMELINDA Munson Pharm.D.      "

## 2018-05-08 ENCOUNTER — HOSPITAL ENCOUNTER (OUTPATIENT)
Facility: MEDICAL CENTER | Age: 63
End: 2018-05-08
Attending: UROLOGY | Admitting: UROLOGY
Payer: COMMERCIAL

## 2018-05-08 ENCOUNTER — APPOINTMENT (OUTPATIENT)
Dept: RADIOLOGY | Facility: MEDICAL CENTER | Age: 63
End: 2018-05-08
Attending: UROLOGY
Payer: COMMERCIAL

## 2018-05-08 VITALS
RESPIRATION RATE: 14 BRPM | OXYGEN SATURATION: 98 % | HEART RATE: 78 BPM | DIASTOLIC BLOOD PRESSURE: 77 MMHG | BODY MASS INDEX: 36.32 KG/M2 | TEMPERATURE: 97.7 F | WEIGHT: 212.74 LBS | HEIGHT: 64 IN | SYSTOLIC BLOOD PRESSURE: 123 MMHG

## 2018-05-08 PROCEDURE — 501138 HCHG PLUG, FOLEY CATH: Performed by: UROLOGY

## 2018-05-08 PROCEDURE — A4338 INDWELLING CATHETER LATEX: HCPCS | Performed by: UROLOGY

## 2018-05-08 PROCEDURE — 88305 TISSUE EXAM BY PATHOLOGIST: CPT | Mod: 59

## 2018-05-08 PROCEDURE — 500042 HCHG BAG, URINARY DRAINAGE (CLOSED): Performed by: UROLOGY

## 2018-05-08 PROCEDURE — 160036 HCHG PACU - EA ADDL 30 MINS PHASE I: Performed by: UROLOGY

## 2018-05-08 PROCEDURE — 700111 HCHG RX REV CODE 636 W/ 250 OVERRIDE (IP): Performed by: UROLOGY

## 2018-05-08 PROCEDURE — 160046 HCHG PACU - 1ST 60 MINS PHASE II: Performed by: UROLOGY

## 2018-05-08 PROCEDURE — 700101 HCHG RX REV CODE 250

## 2018-05-08 PROCEDURE — 700111 HCHG RX REV CODE 636 W/ 250 OVERRIDE (IP)

## 2018-05-08 PROCEDURE — A9270 NON-COVERED ITEM OR SERVICE: HCPCS

## 2018-05-08 PROCEDURE — 500448 HCHG DRESSING, TELFA 3X4: Performed by: UROLOGY

## 2018-05-08 PROCEDURE — 160002 HCHG RECOVERY MINUTES (STAT): Performed by: UROLOGY

## 2018-05-08 PROCEDURE — 160009 HCHG ANES TIME/MIN: Performed by: UROLOGY

## 2018-05-08 PROCEDURE — 160028 HCHG SURGERY MINUTES - 1ST 30 MINS LEVEL 3: Performed by: UROLOGY

## 2018-05-08 PROCEDURE — 160048 HCHG OR STATISTICAL LEVEL 1-5: Performed by: UROLOGY

## 2018-05-08 PROCEDURE — 501329 HCHG SET, CYSTO IRRIG Y TUR: Performed by: UROLOGY

## 2018-05-08 PROCEDURE — 160035 HCHG PACU - 1ST 60 MINS PHASE I: Performed by: UROLOGY

## 2018-05-08 PROCEDURE — 700102 HCHG RX REV CODE 250 W/ 637 OVERRIDE(OP)

## 2018-05-08 PROCEDURE — 160039 HCHG SURGERY MINUTES - EA ADDL 1 MIN LEVEL 3: Performed by: UROLOGY

## 2018-05-08 PROCEDURE — 160025 RECOVERY II MINUTES (STATS): Performed by: UROLOGY

## 2018-05-08 PROCEDURE — 700101 HCHG RX REV CODE 250: Performed by: UROLOGY

## 2018-05-08 PROCEDURE — 88307 TISSUE EXAM BY PATHOLOGIST: CPT

## 2018-05-08 RX ORDER — LIDOCAINE HYDROCHLORIDE 10 MG/ML
INJECTION, SOLUTION INFILTRATION; PERINEURAL
Status: COMPLETED
Start: 2018-05-08 | End: 2018-05-08

## 2018-05-08 RX ORDER — ACETAMINOPHEN 500 MG
1000 TABLET ORAL
COMMUNITY
End: 2019-06-25

## 2018-05-08 RX ORDER — LIDOCAINE HYDROCHLORIDE 10 MG/ML
0.5 INJECTION, SOLUTION INFILTRATION; PERINEURAL
Status: DISCONTINUED | OUTPATIENT
Start: 2018-05-08 | End: 2018-05-08 | Stop reason: HOSPADM

## 2018-05-08 RX ORDER — SODIUM CHLORIDE, SODIUM LACTATE, POTASSIUM CHLORIDE, CALCIUM CHLORIDE 600; 310; 30; 20 MG/100ML; MG/100ML; MG/100ML; MG/100ML
INJECTION, SOLUTION INTRAVENOUS CONTINUOUS
Status: DISCONTINUED | OUTPATIENT
Start: 2018-05-08 | End: 2018-05-08 | Stop reason: HOSPADM

## 2018-05-08 RX ORDER — LISINOPRIL 20 MG/1
20 TABLET ORAL EVERY EVENING
COMMUNITY
End: 2018-06-04 | Stop reason: SDUPTHER

## 2018-05-08 RX ORDER — ATROPA BELLADONNA AND OPIUM 16.2; 6 MG/1; MG/1
SUPPOSITORY RECTAL
Status: DISCONTINUED
Start: 2018-05-08 | End: 2018-05-08 | Stop reason: HOSPADM

## 2018-05-08 RX ORDER — OXYCODONE HCL 5 MG/5 ML
SOLUTION, ORAL ORAL
Status: COMPLETED
Start: 2018-05-08 | End: 2018-05-08

## 2018-05-08 RX ADMIN — SODIUM CHLORIDE, SODIUM LACTATE, POTASSIUM CHLORIDE, CALCIUM CHLORIDE: 600; 310; 30; 20 INJECTION, SOLUTION INTRAVENOUS at 09:40

## 2018-05-08 RX ADMIN — LIDOCAINE HYDROCHLORIDE 0.5 ML: 10 INJECTION, SOLUTION INFILTRATION; PERINEURAL at 09:40

## 2018-05-08 RX ADMIN — OXYCODONE HYDROCHLORIDE 5 MG: 5 SOLUTION ORAL at 12:46

## 2018-05-08 RX ADMIN — FENTANYL CITRATE 50 MCG: 50 INJECTION, SOLUTION INTRAMUSCULAR; INTRAVENOUS at 13:24

## 2018-05-08 RX ADMIN — FENTANYL CITRATE 50 MCG: 50 INJECTION, SOLUTION INTRAMUSCULAR; INTRAVENOUS at 13:30

## 2018-05-08 ASSESSMENT — PAIN SCALES - GENERAL
PAINLEVEL_OUTOF10: 4
PAINLEVEL_OUTOF10: 2
PAINLEVEL_OUTOF10: 4
PAINLEVEL_OUTOF10: 2
PAINLEVEL_OUTOF10: 4
PAINLEVEL_OUTOF10: 2
PAINLEVEL_OUTOF10: 4
PAINLEVEL_OUTOF10: 2
PAINLEVEL_OUTOF10: 1
PAINLEVEL_OUTOF10: 4

## 2018-05-08 NOTE — OR NURSING
Patient west unclamped as ordered by Dr Montes.  West allowed to drain mitomycin and then flushed with 100ml of ns and also allowed to drain.  West irrigated with 250ml and then d/cd.  Patient up to bathroom and was able to void 350ml of pink tindged urine.  No clots.  Report called to Connie BANSAL patient taken to [facility] [place] for discharge home as ordered

## 2018-05-08 NOTE — OP REPORT
DATE OF SERVICE:  05/08/2018    TIME OF DICTATION:  1221    INDICATION FOR PROCEDURE:  Patient has microscopic hematuria and large bladder   tumor across the right bladder wall, UO near bladder neck, and trigone on the   right side.  She understands risks and benefits of the procedure as described   to her and in the consent form and in the preoperative H and P and desired to   proceed with her  at the bedside.    PREOPERATIVE DIAGNOSIS:  Bladder tumor, large.    POSTOPERATIVE DIAGNOSIS:  Bladder tumor, large.    PROCEDURE PERFORMED:  Random bladder biopsy, fulguration small tumors,   transurethral resection of bladder tumor, large cystogram with no   extravasation and mitomycin-C instillation.    SURGEON:  Sigifredo Montes MD    ANESTHESIOLOGIST:  Richmond Singh DO    CIRCULATOR:  Abbi Cisneros, Sylvester Ahn, and scrub person, George Tarango.    SPECIMEN:  Posterior bladder wall biopsy with tumor, left and right bladder   wall random biopsy, right bladder wall and trigone first and second specimens,   bladder neck at 7:00 and right UO.    ESTIMATED BLOOD LOSS:  3 mL    FINDINGS:  Bladder tumor right bladder wall across right UO and trigone and   posterior wall and small sporadic tumors along the right bladder wall and   right posterior wall that were fulgurated.    COMPLICATIONS:  None.    OPERATIVE TECHNIQUE:  The procedure was carried out in the following fashion.    After the patient was properly identified, the labs were reviewed, the   consent verified, the H and P updated.  The plan was discussed with patient   and the operative team.  The patient expressed verbal understanding of the   procedure and desired to proceed and had no further questions.  The patient   was then taken to the operative theater, placed in supine position where   general anesthesia was introduced.  She was then taken to dorsal lithotomy   position, care being taken to pad all pressure points and avoid any   perturbations of  joints that may cause injury and this was maintained   throughout the procedure.  The patient was prepped and draped in the usual   sterile fashion and a surgical time-out was done.  Antibiotics were given and   there were no issues with the time-out.  Proper patient, site, and procedure   were identified.  All the equipment was in the room and all in the room   desired to proceed with no objection.  The procedure was begun by introducing   the Niesha dilators up to 28 [place] and introducing the 26-[place] bipolar   resectoscope sheath into the bladder.  The bladder biopsies were taken as   noted x3.  We then fulgurated the smaller tumors and then resected the tumor   as the specimens were mentioned above.  We fulgurated the base entirely, spot   cauterized around the UO, which was not obstructed and the nicho UO as I   resected the regular UO because it had tumor around it was uninjured.  There   was no hematuria coming at the end of the case.  We filled the bladder and did   a cystogram.  There was no extravasation.  The catheter was then placed and   the mitomycin instilled with a B and O suppository being given.  The Byrne was   clamped and hooked up to the CBI and the bag and the patient was taken to   PACU in stable condition.       ____________________________________     MD TY De Jesus / RODRIGUE    DD:  05/08/2018 12:25:25  DT:  05/08/2018 13:40:38    D#:  [id number]  Job#:  [id number]

## 2018-05-08 NOTE — OR SURGEON
Immediate Post OP Note    PreOp Diagnosis: Bladder tumor Large    PostOp Diagnosis: Bladder tumor Large    Procedure(s): Random Bladder Biopsy  Fulgaration of small tumors  TRANS URETHRAL RESECTION BLADDER- TUMOR  Cystogram with no extravasation  Mitomycin C - Wound Class: Clean Contaminated    Surgeon(s):  Sigifredo Montes M.D.    Anesthesiologist/Type of Anesthesia:  Anesthesiologist: Richmond Singh D.O./General    Surgical Staff:  Circulator: Abbi Cisneros R.N.  Relief Circulator: Sylvester Adams R.N.  Scrub Person: George Tarango    Specimens removed if any:  Post bladder wall with Tumor  LEft and right Bladder wall Random Biopsy  Right WAll and trigone 1 and 2.  Bladder neck 0700  Right OU.    Estimated Blood Loss: 3 cc    Findings: BT acrossed Right Wall, r ou and trigone and post wall. Definitely Musc, on specimen.    Complications: none.  [id number]        5/8/2018 12:18 PM Sigifredo Montes M.D.

## 2018-05-08 NOTE — OR NURSING
Patient A+OX4. Denies pain or nausea.  Dr Montes at bedside to discuss plan of care.  To keep west clamped until 1345 then remove mitomycin and west bag as per protocol, and replace with new bag.

## 2018-05-08 NOTE — DISCHARGE INSTRUCTIONS
ACTIVITY: Rest and take it easy for the first 24 hours.  A responsible adult is recommended to remain with you during that time.  It is normal to feel sleepy.  We encourage you to not do anything that requires balance, judgment or coordination.    MILD FLU-LIKE SYMPTOMS ARE NORMAL. YOU MAY EXPERIENCE GENERALIZED MUSCLE ACHES, THROAT IRRITATION, HEADACHE AND/OR SOME NAUSEA.    FOR 24 HOURS DO NOT:  Drive, operate machinery or run household appliances.  Drink beer or alcoholic beverages.   Make important decisions or sign legal documents.    SPECIAL INSTRUCTIONS:  Keep Mitomycin C until 1345pm empty, flush 100c  And drain, then fill to 250-300 or until feels like she has to pee with NS and then d./ c west and have void. If can't void replace west and f/u 11:45 in am for VT. \Call if any questions. Call if you experience shortness of breath, chest pain, lower extremity edema, nausea or vomiting, fever, chills, sweats or any new symptoms. Light activity for 7 days.    DIET: To avoid nausea, slowly advance diet as tolerated, avoiding spicy or greasy foods for the first day.  Add more substantial food to your diet according to your physician's instructions. INCREASE FLUIDS AND FIBER TO AVOID CONSTIPATION.  Resume home diet.    SURGICAL DRESSING/BATHING: ***    FOLLOW-UP APPOINTMENT:  A follow-up appointment should be arranged with your doctor in 1-2 weeks; call to schedule.    You should CALL YOUR PHYSICIAN if you develop:  Fever greater than 101 degrees F.  Pain not relieved by medication, or persistent nausea or vomiting.  Excessive bleeding (blood soaking through dressing) or unexpected drainage from the wound.  Extreme redness or swelling around the incision site, drainage of pus or foul smelling drainage.  Inability to urinate or empty your bladder within 8 hours.  Problems with breathing or chest pain.    You should call 911 if you develop problems with breathing or chest pain.  If you are unable to contact your  doctor or surgical center, you should go to the nearest emergency room or urgent care center.  Physician's telephone #: Dr. Montes [phone]  Dr. Montes    If any questions arise, call your doctor.  If your doctor is not available, please feel free to call the Surgical Center at [phone].  The Center is open Monday through Friday from 7AM to 7PM.  You can also call the HEALTH HOTLINE open 24 hours/day, 7 days/week and speak to a nurse at [phone], or toll free at [phone].    A registered nurse may call you a few days after your surgery to see how you are doing after your procedure.    MEDICATIONS: Resume taking daily medication.  Take prescribed pain medication with food.  If no medication is prescribed, you may take non-aspirin pain medication if needed.  PAIN MEDICATION CAN BE VERY CONSTIPATING.  Take a stool softener or laxative such as senokot, pericolace, or milk of magnesia if needed.    Prescription given for *Norco, Colase, Ditropan, Ceftin**.  Last pain medication given at 12:46 pm.    If your physician has prescribed pain medication that includes Acetaminophen (Tylenol), do not take additional Acetaminophen (Tylenol) while taking the prescribed medication.    Depression / Suicide Risk    As you are discharged from this [facility] facility, it is important to learn how to keep safe from harming yourself.    Recognize the warning signs:  · Abrupt changes in personality, positive or negative- including increase in energy   · Giving away possessions  · Change in eating patterns- significant weight changes-  positive or negative  · Change in sleeping patterns- unable to sleep or sleeping all the time   · Unwillingness or inability to communicate  · Depression  · Unusual sadness, discouragement and loneliness  · Talk of wanting to die  · Neglect of personal appearance   · Rebelliousness- reckless behavior  · Withdrawal from people/activities they love  · Confusion- inability to  concentrate     If you or a loved one observes any of these behaviors or has concerns about self-harm, here's what you can do:  · Talk about it- your feelings and reasons for harming yourself  · Remove any means that you might use to hurt yourself (examples: pills, rope, extension cords, firearm)  · Get professional help from the community (Mental Health, Substance Abuse, psychological counseling)  · Do not be alone:Call your Safe Contact- someone whom you trust who will be there for you.  · Call your local CRISIS HOTLINE 711-1073 or 337-626-9062  · Call your local Children's Mobile Crisis Response Team [place] (761) 258-1367 or www.[employer]  · Call the toll free National Suicide Prevention Hotlines   · National Suicide Prevention Lifeline 582-857-DGAL (4477)  · National Hope Line Network 800-SUICIDE (179-7414)

## 2018-05-23 ENCOUNTER — HOSPITAL ENCOUNTER (OUTPATIENT)
Dept: LAB | Facility: MEDICAL CENTER | Age: 63
End: 2018-05-23
Attending: UROLOGY
Payer: COMMERCIAL

## 2018-05-23 LAB
AMBIGUOUS DTTM AMBI4: NORMAL
APPEARANCE UR: ABNORMAL
BACTERIA #/AREA URNS HPF: NEGATIVE /HPF
BILIRUB UR QL STRIP.AUTO: NEGATIVE
COLOR UR: YELLOW
EPI CELLS #/AREA URNS HPF: ABNORMAL /HPF
GLUCOSE UR STRIP.AUTO-MCNC: NEGATIVE MG/DL
HYALINE CASTS #/AREA URNS LPF: ABNORMAL /LPF
KETONES UR STRIP.AUTO-MCNC: ABNORMAL MG/DL
LEUKOCYTE ESTERASE UR QL STRIP.AUTO: ABNORMAL
MICRO URNS: ABNORMAL
NITRITE UR QL STRIP.AUTO: NEGATIVE
PH UR STRIP.AUTO: 5.5 [PH]
PROT UR QL STRIP: 100 MG/DL
RBC # URNS HPF: ABNORMAL /HPF
RBC UR QL AUTO: ABNORMAL
RENAL EPI CELLS #/AREA URNS HPF: ABNORMAL /HPF
SIGNIFICANT IND 70042: NORMAL
SITE SITE: NORMAL
SOURCE SOURCE: NORMAL
SP GR UR STRIP.AUTO: 1.03
UROBILINOGEN UR STRIP.AUTO-MCNC: 0.2 MG/DL
WBC #/AREA URNS HPF: ABNORMAL /HPF

## 2018-05-23 PROCEDURE — 87086 URINE CULTURE/COLONY COUNT: CPT

## 2018-05-23 PROCEDURE — 81001 URINALYSIS AUTO W/SCOPE: CPT

## 2018-05-25 LAB
BACTERIA UR CULT: NORMAL
SIGNIFICANT IND 70042: NORMAL
SITE SITE: NORMAL
SOURCE SOURCE: NORMAL

## 2018-06-04 DIAGNOSIS — F41.9 ANXIETY: ICD-10-CM

## 2018-06-04 DIAGNOSIS — M25.50 MULTIPLE JOINT PAIN: ICD-10-CM

## 2018-06-04 DIAGNOSIS — F51.01 PRIMARY INSOMNIA: ICD-10-CM

## 2018-06-04 RX ORDER — ROSUVASTATIN CALCIUM 20 MG/1
TABLET, COATED ORAL
Qty: 90 TAB | Refills: 3 | Status: SHIPPED | OUTPATIENT
Start: 2018-06-04 | End: 2019-06-03 | Stop reason: SDUPTHER

## 2018-06-04 RX ORDER — LISINOPRIL 20 MG/1
20 TABLET ORAL EVERY EVENING
Qty: 90 TAB | Refills: 3 | Status: SHIPPED | OUTPATIENT
Start: 2018-06-04 | End: 2019-06-03 | Stop reason: SDUPTHER

## 2018-06-04 RX ORDER — ZOLPIDEM TARTRATE 5 MG/1
5 TABLET ORAL NIGHTLY PRN
Qty: 30 TAB | Refills: 0 | Status: SHIPPED
Start: 2018-06-04 | End: 2018-06-05 | Stop reason: SDUPTHER

## 2018-06-04 NOTE — TELEPHONE ENCOUNTER
Was the patient seen in the last year in this department? Yes  refill 5/9/18    Does patient have an active prescription for medications requested? No     Received Request Via: Patient

## 2018-06-05 DIAGNOSIS — F51.01 PRIMARY INSOMNIA: ICD-10-CM

## 2018-06-05 RX ORDER — TRAMADOL HYDROCHLORIDE 50 MG/1
50 TABLET ORAL EVERY 6 HOURS PRN
Qty: 90 TAB | Refills: 0 | Status: SHIPPED
Start: 2018-06-05 | End: 2018-07-03 | Stop reason: SDUPTHER

## 2018-06-05 RX ORDER — ALPRAZOLAM 0.5 MG/1
0.5 TABLET ORAL NIGHTLY PRN
Qty: 30 TAB | Refills: 0 | Status: SHIPPED
Start: 2018-06-05 | End: 2018-07-03 | Stop reason: SDUPTHER

## 2018-06-05 RX ORDER — ZOLPIDEM TARTRATE 10 MG/1
10 TABLET ORAL NIGHTLY PRN
Qty: 30 TAB | Refills: 2 | Status: SHIPPED
Start: 2018-06-05 | End: 2018-07-05

## 2018-06-05 NOTE — TELEPHONE ENCOUNTER
Was the patient seen in the last year in this department? Yes     Does patient have an active prescription for medications requested? No     Received Request Via: Patient     Per  last fill:   Tramadol: 5-7-18 # 90  Xanax: 5-7-18 # 30

## 2018-06-05 NOTE — TELEPHONE ENCOUNTER
Prescription faxed to:    [id number] - [place], [place] - [address]  [address] [address]  Phone: [phone] Fax: [phone]  .

## 2018-06-14 DIAGNOSIS — M25.50 MULTIPLE JOINT PAIN: ICD-10-CM

## 2018-08-03 ENCOUNTER — TELEPHONE (OUTPATIENT)
Dept: MEDICAL GROUP | Facility: LAB | Age: 63
End: 2018-08-03

## 2018-08-03 NOTE — TELEPHONE ENCOUNTER
MEDICATION PRIOR AUTHORIZATION NEEDED:    1. Name of Medication: zolpidem (AMBIEN) 10 MG Tab    2. Requested By (Name of Pharmacy): [facility] [phone]     3. Is insurance on file current? YES    4. What is the name & phone number of the 3rd party payor? [email or web address]

## 2018-08-06 DIAGNOSIS — M25.50 MULTIPLE JOINT PAIN: ICD-10-CM

## 2018-08-06 DIAGNOSIS — F41.1 GAD (GENERALIZED ANXIETY DISORDER): ICD-10-CM

## 2018-08-06 RX ORDER — TRAMADOL HYDROCHLORIDE 50 MG/1
TABLET ORAL
Qty: 90 TAB | Refills: 1 | Status: SHIPPED
Start: 2018-08-06 | End: 2018-09-05

## 2018-08-06 RX ORDER — ALPRAZOLAM 0.5 MG/1
0.5 TABLET ORAL NIGHTLY PRN
Qty: 30 TAB | Refills: 1 | Status: SHIPPED
Start: 2018-08-06 | End: 2018-09-05

## 2018-08-06 NOTE — TELEPHONE ENCOUNTER
Was the patient seen in the last year in this department? Yes    Does patient have an active prescription for medications requested? No     Received Request Via: Pharmacy     Last visit:3/8/18    Last  fill: 7/3/18 for bot

## 2018-08-15 NOTE — TELEPHONE ENCOUNTER
FINAL PRIOR AUTHORIZATION STATUS:    1.  Name of Medication & Dose: Ambien     2. Prior Auth Status: Approved through through 8/15/2019     3. Action Taken: Pharmacy Notified: no Patient Notified: yes  Pt insurance changed to [employer]- I called in PAR  [phone]

## 2018-08-20 ENCOUNTER — TELEPHONE (OUTPATIENT)
Dept: MEDICAL GROUP | Facility: LAB | Age: 63
End: 2018-08-20

## 2018-08-20 NOTE — TELEPHONE ENCOUNTER
ESTABLISHED PATIENT PRE-VISIT PLANNING     Note: Patient will not be contacted if there is no indication to call.     1.  Reviewed notes from the last few office visits within the medical group: Yes    2.  If any orders were placed at last visit or intended to be done for this visit (i.e. 6 mos follow-up), do we have Results/Consult Notes?        •  Labs - Labs ordered, completed on 3/2/18 and results are in chart.       •  Imaging - Imaging was not ordered at last office visit.       •  Referrals - No referrals were ordered at last office visit.    3. Is this appointment scheduled as a Hospital Follow-Up? No    4.  Immunizations were updated in Epic using WebIZ?: Epic matches WebIZ       •  Web Iz Recommendations: MMR  and ZOSTAVAX (Shingles)    5.  Patient is due for the following Health Maintenance Topics:   Health Maintenance Due   Topic Date Due   • IMM ZOSTER VACCINES (1 of 2) 12/29/2007   • IMM INFLUENZA (1) 09/01/2018       - Patient has completed TDAP Immunization(s) per WebIZ. Chart has been updated.    6.  MDX printed for Provider? NO    7.  Patient was NOT informed to arrive 15 min prior to their scheduled appointment and bring in their medication bottles.

## 2018-08-21 ENCOUNTER — OFFICE VISIT (OUTPATIENT)
Dept: MEDICAL GROUP | Facility: LAB | Age: 63
End: 2018-08-21
Payer: COMMERCIAL

## 2018-08-21 VITALS
WEIGHT: 215 LBS | TEMPERATURE: 98.5 F | RESPIRATION RATE: 12 BRPM | DIASTOLIC BLOOD PRESSURE: 88 MMHG | SYSTOLIC BLOOD PRESSURE: 128 MMHG | BODY MASS INDEX: 36.7 KG/M2 | HEART RATE: 94 BPM | HEIGHT: 64 IN | OXYGEN SATURATION: 95 %

## 2018-08-21 DIAGNOSIS — G89.29 CHRONIC BILATERAL LOW BACK PAIN WITH BILATERAL SCIATICA: ICD-10-CM

## 2018-08-21 DIAGNOSIS — Z85.51 HISTORY OF BLADDER CANCER: ICD-10-CM

## 2018-08-21 DIAGNOSIS — E66.9 OBESITY (BMI 30-39.9): ICD-10-CM

## 2018-08-21 DIAGNOSIS — M54.42 CHRONIC BILATERAL LOW BACK PAIN WITH BILATERAL SCIATICA: ICD-10-CM

## 2018-08-21 DIAGNOSIS — R73.09 ELEVATED HEMOGLOBIN A1C: ICD-10-CM

## 2018-08-21 DIAGNOSIS — M54.41 CHRONIC BILATERAL LOW BACK PAIN WITH BILATERAL SCIATICA: ICD-10-CM

## 2018-08-21 DIAGNOSIS — R94.4 DECREASED GFR: ICD-10-CM

## 2018-08-21 DIAGNOSIS — E78.49 OTHER HYPERLIPIDEMIA: ICD-10-CM

## 2018-08-21 PROCEDURE — 99214 OFFICE O/P EST MOD 30 MIN: CPT | Performed by: NURSE PRACTITIONER

## 2018-08-21 ASSESSMENT — PATIENT HEALTH QUESTIONNAIRE - PHQ9: CLINICAL INTERPRETATION OF PHQ2 SCORE: 0

## 2018-08-21 NOTE — ASSESSMENT & PLAN NOTE
Dx 4/2018 and resected 5/2018.  Just finished 6 weeks of treatment which was very painful.  Has cystoscopy coming up.

## 2018-08-21 NOTE — PROGRESS NOTES
Chief Complaint   Patient presents with   • Back Pain     pt states she is having lower middle back pain, ongoing getting worse    • Finger Pain     pt states she is having some finger joint pain, some lumps   • Cancer     pt would like to discuss her dx with bladder cancer        HPI   Fadumo is a 62-year-old established female here with a couple of concerns:    History of bladder cancer  Dx 4/2018 and resected 5/2018.  Just finished 6 weeks of treatment which was very painful.  Has cystoscopy coming up.  Denies any current problems with urination.    Decreased GFR  New issue seen on lab work in May after her bladder cancer resection. No oral intake as nsaids.  + Prediabetes and HTN.  Stopped hctz in may. continues on lisinopril daily.    Component      Latest Ref Rng & Units 5/16/2012 5/13/2014 1/13/2017 3/2/2018           7:26 AM  7:20 AM  7:16 AM  8:32 AM   GFR If African American      >60 mL/min/1.73 m 2 >60 >60 >60 >60   GFR If Non African American      >60 mL/min/1.73 m 2 >60 >60 >60 59 (A)     Component      Latest Ref Rng & Units 5/2/2018           2:54 PM   GFR If African American      >60 mL/min/1.73 m 2 57 (A)   GFR If Non African American      >60 mL/min/1.73 m 2 47 (A)       Elevated hemoglobin A1c  Chronic issue.  Not on medications for this.  Walks and rides her bike for exercise.  Has not been following a diabetic diet.  Denies any numbness or tingling in her feet.  Component      Latest Ref Rng & Units 8/15/2014 1/13/2017 3/2/2018           7:09 AM  7:16 AM  8:32 AM   Glycohemoglobin      0.0 - 5.6 % 6.1 (H) 6.2 (H) 6.0 (H)   Estim. Avg Glu      mg/dL 128 131 126     Chronic low back pain:  She tells me that she has suffered from low back pain for many years but feels that her back pain is worsening.  Her back pain is worse when she sits, stands or walks for long period of time.  Her back pain does occasionally radiate down one of her legs.  Denies loss of her bowels or bladder.  Her back pain is  "not worse when she is riding her bike.  Denies any specific injuries or traumas.  Last x-ray was done in October showing moderate arthritis.  She has not had an MRI.    Past medical, surgical, family, and social history is reviewed and updated in Epic chart by me today.   Medications and allergies reviewed and updated in Epic chart by me today.     ROS:   As documented in history of present illness above    Exam:  Blood pressure 128/88, pulse 94, temperature 36.9 °C (98.5 °F), resp. rate 12, height 1.626 m (5' 4\"), weight 97.5 kg (215 lb), last menstrual period 09/10/2011, SpO2 95 %.  Constitutional: Alert, no distress, plus 3 vital signs  Skin:  Warm, dry, no rashes invisible areas  Eye: Equal, round and reactive, conjunctiva clear  ENMT: Lips without lesions, good dentition, oropharynx clear    Neck: Trachea midline  Respiratory: Unlabored respiration, lungs clear to auscultation, no wheezes, no rhonchi  Cardiovascular: Normal rate and rhythm  Abdomen: Soft, nontender  Back: Appearance: normal   Palpation: Lumbar paraspinous tenderness to palpate.  She also has tenderness to both SI joints  ROM:  mild forward flexion and extension deficits seen  Reflexes - Patellar 2+ bilaterally; Achilles 2+ bilaterally   Straight Leg Raise -positive on the right, negative on the left  Lower Ext: Strength: 5/5 throughout bilaterally   Sensation: grossly intact throughout bilaterally  Psych: Alert, pleasant, well-groomed, normal affect    A/P:  1. History of bladder cancer  -Stable and followed by urology.    2. Chronic bilateral low back pain with bilateral sciatica  -Recommend MRI and consult with physiatry.  Encouraged her as always to work on trunk strengthening, portion control and weight loss.  - MR-LUMBAR SPINE-W/O; Future  - REFERRAL TO PHYSIATRY (PMR)    3. Decreased GFR  -Suspect this was related to her recent surgery.  We will recheck this.  She will try and stay well-hydrated and she continues off of NSAIDs.    4. " Other hyperlipidemia  -Recommend a recheck of her lipid panel and following up here    5. Elevated hemoglobin A1c  -Counseled regarding the importance of a low carbohydrate, diabetic diet.  Discussed portion control and weight loss.  Discussed importance of exercise.  Discussed the dangers of type 2 diabetes.  Will check an A1c and have her follow-up.

## 2018-08-21 NOTE — ASSESSMENT & PLAN NOTE
Chronic issue.  Not on medications for this.  Walks and rides her bike for exercise.  Has not been following a diabetic diet.    Component      Latest Ref Rng & Units 8/15/2014 1/13/2017 3/2/2018           7:09 AM  7:16 AM  8:32 AM   Glycohemoglobin      0.0 - 5.6 % 6.1 (H) 6.2 (H) 6.0 (H)   Estim. Avg Glu      mg/dL 128 131 126

## 2018-08-21 NOTE — ASSESSMENT & PLAN NOTE
New issue. No nsaids.  + type II diabetes and HTN.  Stopped hctz in may. continues on lisinopril daily.    Component      Latest Ref Rng & Units 5/16/2012 5/13/2014 1/13/2017 3/2/2018           7:26 AM  7:20 AM  7:16 AM  8:32 AM   GFR If African American      >60 mL/min/1.73 m 2 >60 >60 >60 >60   GFR If Non African American      >60 mL/min/1.73 m 2 >60 >60 >60 59 (A)     Component      Latest Ref Rng & Units 5/2/2018           2:54 PM   GFR If African American      >60 mL/min/1.73 m 2 57 (A)   GFR If Non African American      >60 mL/min/1.73 m 2 47 (A)

## 2018-08-28 ENCOUNTER — HOSPITAL ENCOUNTER (OUTPATIENT)
Dept: RADIOLOGY | Facility: MEDICAL CENTER | Age: 63
End: 2018-08-28
Attending: NURSE PRACTITIONER
Payer: COMMERCIAL

## 2018-08-28 DIAGNOSIS — M54.41 CHRONIC BILATERAL LOW BACK PAIN WITH BILATERAL SCIATICA: ICD-10-CM

## 2018-08-28 DIAGNOSIS — M54.42 CHRONIC BILATERAL LOW BACK PAIN WITH BILATERAL SCIATICA: ICD-10-CM

## 2018-08-28 DIAGNOSIS — G89.29 CHRONIC BILATERAL LOW BACK PAIN WITH BILATERAL SCIATICA: ICD-10-CM

## 2018-08-28 PROCEDURE — 72148 MRI LUMBAR SPINE W/O DYE: CPT

## 2018-09-05 ENCOUNTER — HOSPITAL ENCOUNTER (OUTPATIENT)
Dept: LAB | Facility: MEDICAL CENTER | Age: 63
End: 2018-09-05
Attending: NURSE PRACTITIONER
Payer: COMMERCIAL

## 2018-09-05 DIAGNOSIS — M25.50 MULTIPLE JOINT PAIN: ICD-10-CM

## 2018-09-05 DIAGNOSIS — E78.49 OTHER HYPERLIPIDEMIA: ICD-10-CM

## 2018-09-05 DIAGNOSIS — R73.09 ELEVATED HEMOGLOBIN A1C: ICD-10-CM

## 2018-09-05 LAB
ALBUMIN SERPL BCP-MCNC: 4.5 G/DL (ref 3.2–4.9)
ALBUMIN/GLOB SERPL: 1.6 G/DL
ALP SERPL-CCNC: 49 U/L (ref 30–99)
ALT SERPL-CCNC: 19 U/L (ref 2–50)
ANION GAP SERPL CALC-SCNC: 12 MMOL/L (ref 0–11.9)
AST SERPL-CCNC: 19 U/L (ref 12–45)
BASOPHILS # BLD AUTO: 0.8 % (ref 0–1.8)
BASOPHILS # BLD: 0.04 K/UL (ref 0–0.12)
BILIRUB SERPL-MCNC: 0.6 MG/DL (ref 0.1–1.5)
BUN SERPL-MCNC: 17 MG/DL (ref 8–22)
CALCIUM SERPL-MCNC: 9.5 MG/DL (ref 8.5–10.5)
CHLORIDE SERPL-SCNC: 98 MMOL/L (ref 96–112)
CHOLEST SERPL-MCNC: 175 MG/DL (ref 100–199)
CO2 SERPL-SCNC: 26 MMOL/L (ref 20–33)
CREAT SERPL-MCNC: 0.93 MG/DL (ref 0.5–1.4)
CREAT UR-MCNC: 153 MG/DL
EOSINOPHIL # BLD AUTO: 0.1 K/UL (ref 0–0.51)
EOSINOPHIL NFR BLD: 1.9 % (ref 0–6.9)
ERYTHROCYTE [DISTWIDTH] IN BLOOD BY AUTOMATED COUNT: 50.1 FL (ref 35.9–50)
EST. AVERAGE GLUCOSE BLD GHB EST-MCNC: 131 MG/DL
GLOBULIN SER CALC-MCNC: 2.8 G/DL (ref 1.9–3.5)
GLUCOSE SERPL-MCNC: 113 MG/DL (ref 65–99)
HBA1C MFR BLD: 6.2 % (ref 0–5.6)
HCT VFR BLD AUTO: 39.2 % (ref 37–47)
HDLC SERPL-MCNC: 55 MG/DL
HGB BLD-MCNC: 12.7 G/DL (ref 12–16)
IMM GRANULOCYTES # BLD AUTO: 0.02 K/UL (ref 0–0.11)
IMM GRANULOCYTES NFR BLD AUTO: 0.4 % (ref 0–0.9)
LDLC SERPL CALC-MCNC: 91 MG/DL
LYMPHOCYTES # BLD AUTO: 2.09 K/UL (ref 1–4.8)
LYMPHOCYTES NFR BLD: 40.6 % (ref 22–41)
MCH RBC QN AUTO: 28.3 PG (ref 27–33)
MCHC RBC AUTO-ENTMCNC: 32.4 G/DL (ref 33.6–35)
MCV RBC AUTO: 87.3 FL (ref 81.4–97.8)
MICROALBUMIN UR-MCNC: 3.1 MG/DL
MICROALBUMIN/CREAT UR: 20 MG/G (ref 0–30)
MONOCYTES # BLD AUTO: 0.47 K/UL (ref 0–0.85)
MONOCYTES NFR BLD AUTO: 9.1 % (ref 0–13.4)
NEUTROPHILS # BLD AUTO: 2.43 K/UL (ref 2–7.15)
NEUTROPHILS NFR BLD: 47.2 % (ref 44–72)
NRBC # BLD AUTO: 0 K/UL
NRBC BLD-RTO: 0 /100 WBC
PLATELET # BLD AUTO: 201 K/UL (ref 164–446)
PMV BLD AUTO: 11.6 FL (ref 9–12.9)
POTASSIUM SERPL-SCNC: 4.1 MMOL/L (ref 3.6–5.5)
PROT SERPL-MCNC: 7.3 G/DL (ref 6–8.2)
RBC # BLD AUTO: 4.49 M/UL (ref 4.2–5.4)
SODIUM SERPL-SCNC: 136 MMOL/L (ref 135–145)
TRIGL SERPL-MCNC: 143 MG/DL (ref 0–149)
WBC # BLD AUTO: 5.2 K/UL (ref 4.8–10.8)

## 2018-09-05 PROCEDURE — 83036 HEMOGLOBIN GLYCOSYLATED A1C: CPT

## 2018-09-05 PROCEDURE — 82043 UR ALBUMIN QUANTITATIVE: CPT

## 2018-09-05 PROCEDURE — 80061 LIPID PANEL: CPT

## 2018-09-05 PROCEDURE — 80053 COMPREHEN METABOLIC PANEL: CPT

## 2018-09-05 PROCEDURE — 85025 COMPLETE CBC W/AUTO DIFF WBC: CPT

## 2018-09-05 PROCEDURE — 36415 COLL VENOUS BLD VENIPUNCTURE: CPT

## 2018-09-05 PROCEDURE — 82570 ASSAY OF URINE CREATININE: CPT

## 2018-09-05 NOTE — TELEPHONE ENCOUNTER
Was the patient seen in the last year in this department? Yes    Does patient have an active prescription for medications requested? No     Received Request Via: Patient     Last visit:8/21/18    Last  fill:8/14/18

## 2018-09-17 DIAGNOSIS — F51.01 PRIMARY INSOMNIA: ICD-10-CM

## 2018-09-18 RX ORDER — ZOLPIDEM TARTRATE 10 MG/1
TABLET ORAL
Qty: 30 TAB | Refills: 2 | Status: SHIPPED
Start: 2018-09-18 | End: 2018-10-18

## 2018-09-18 NOTE — TELEPHONE ENCOUNTER
Was the patient seen in the last year in this department? Yes    Does patient have an active prescription for medications requested? No     Received Request Via: Pharmacy     Per  last fill: 8-18-18 # 15

## 2018-09-18 NOTE — TELEPHONE ENCOUNTER
Prescription faxed to:    [id number] - [place], [place] - [address]  [address] [address]  Phone: [phone] Fax: [phone]  .

## 2018-09-20 ENCOUNTER — OFFICE VISIT (OUTPATIENT)
Dept: PHYSICAL MEDICINE AND REHAB | Facility: MEDICAL CENTER | Age: 63
End: 2018-09-20
Payer: COMMERCIAL

## 2018-09-20 VITALS
HEIGHT: 65 IN | BODY MASS INDEX: 34.82 KG/M2 | WEIGHT: 209 LBS | DIASTOLIC BLOOD PRESSURE: 82 MMHG | TEMPERATURE: 97.8 F | OXYGEN SATURATION: 92 % | SYSTOLIC BLOOD PRESSURE: 120 MMHG | HEART RATE: 68 BPM

## 2018-09-20 DIAGNOSIS — M47.816 LUMBAR SPONDYLOSIS: ICD-10-CM

## 2018-09-20 DIAGNOSIS — M53.3 SACRAL DYSFUNCTION: ICD-10-CM

## 2018-09-20 DIAGNOSIS — M16.0 PRIMARY OSTEOARTHRITIS OF BOTH HIPS: ICD-10-CM

## 2018-09-20 PROCEDURE — 99205 OFFICE O/P NEW HI 60 MIN: CPT | Performed by: PHYSICAL MEDICINE & REHABILITATION

## 2018-09-20 RX ORDER — OXYBUTYNIN CHLORIDE 5 MG/1
TABLET ORAL
COMMUNITY
Start: 2018-07-06 | End: 2019-06-25

## 2018-09-20 RX ORDER — ALPRAZOLAM 0.5 MG/1
0.5 TABLET ORAL NIGHTLY PRN
COMMUNITY
End: 2018-09-30 | Stop reason: SDUPTHER

## 2018-09-20 RX ORDER — TRAMADOL HYDROCHLORIDE 50 MG/1
50 TABLET ORAL EVERY 4 HOURS PRN
COMMUNITY
End: 2018-09-30 | Stop reason: SDUPTHER

## 2018-09-20 RX ORDER — PHENAZOPYRIDINE HYDROCHLORIDE 200 MG/1
200 TABLET, FILM COATED ORAL 3 TIMES DAILY PRN
COMMUNITY
End: 2019-06-25

## 2018-09-20 RX ORDER — NALOXONE HYDROCHLORIDE 4 MG/.1ML
SPRAY NASAL
COMMUNITY
Start: 2018-08-06 | End: 2021-12-09

## 2018-09-20 ASSESSMENT — PAIN SCALES - GENERAL: PAINLEVEL: 2=MINIMAL-SLIGHT

## 2018-09-20 NOTE — PROGRESS NOTES
New patient note    Physiatry (physical medicine and  Rehabilitation), interventional spine and sports medicine    Date of Service: 9/20/2018    Chief complaint: Low back and lateral leg pain    HISTORY    HPI: Fadumo Adair 62 y.o. female who presents today for evaluation of pain lateral hips and low back.  She reports that this has been going on for a number of years.  It seems like this started when she was pregnant, about 27-28 years ago.  The symptoms have gradually worsened.  She reports that she has been to physical therapy a number of times, but is not sure that this has been helpful.  Pain seems to radiates to her knees, but not below this.  No numbness or tingling in her feet.  She was recently diagnosed with bladder cancer and is getting treated for this.  No difficulty with bowel movements.    It is painful for her to sleep on her sides at night.    She has tried a variety of medications over the years.  One was helpful, but caused stomach upset, this was meloxicam.  Naprosyn did not help much.  Sitting and standing makes pain worse.  Walking bothers her.  Riding a bicycle is okay and she does this several times a week.  She exercises about 3 days a week, she goes to an exercise class twice a week.  There are a number of chair exercises and weights.  She does not do pilates or yoga.    Her knees are also bothering her, she has arthritis.  Physical therapy for this last year was very helpful.      Medical records review:  I reviewed the note from the referring provider Ursula Mccord A.     Previous treatments:    Physical Therapy: Yes     Medications the patient is tried: NSAIDs, tramadol and tylenol #3, salonpas patches    Previous interventions or surgeries: none      ROS:   CV: HTN  : Bladder cancer    Red Flags ROS:   Fever, Chills, Sweats: Denies  Involuntary Weight Loss: Denies  Bladder Incontinence: Denies  Bowel Incontinence: denies  Saddle Anesthesia: Denies    All other systems  reviewed and negative.       PMHx:   Past Medical History:   Diagnosis Date   • Anemia    • Arrhythmia     mild, pt not sure problem, does not see cardiologist    • Arthritis     hip, knees, back   • Cancer (HCC)     bladder   • Heart burn    • High cholesterol    • HTN    • Hyperlipemia    • Indigestion    • Low back pain 09-28-11    hips, and knees   • Urinary bladder disorder        PSHx:   Past Surgical History:   Procedure Laterality Date   • TRANS URETHRAL RESECTION BLADDER  5/8/2018    Procedure: TRANS URETHRAL RESECTION BLADDER- TUMOR;  Surgeon: Sigifredo Montes M.D.;  Location: SURGERY [facility];  Service: Urology   • CYSTOSCOPY  10/11/2011    Performed by MOLINA LOGAN at SURGERY [facility]   • VAGINAL PERINEAL EXAM REPAIR  10/11/2011    Performed by MOLINA LOGAN at [facility]   • HYSTERECTOMY ROBOTIC  10/11/2011    Performed by MOLINA LOGAN at [facility]   • PRIMARY C SECTION      x  2   • TONSILLECTOMY AND ADENOIDECTOMY      age 7       Family history   Family History   Problem Relation Age of Onset   • Cancer Mother    • Heart Disease Father          Medications:   Current Outpatient Prescriptions   Medication   • tramadol (ULTRAM) 50 MG Tab   • ALPRAZolam (XANAX) 0.5 MG Tab   • phenazopyridine (PYRIDIUM) 200 MG Tab   • vitamin D (CHOLECALCIFEROL) 1000 UNIT Tab   • coenzyme Q-10 30 MG capsule   • zolpidem (AMBIEN) 10 MG Tab   • acetaminophen-codeine #3 (TYLENOL #3) 300-30 MG Tab   • lisinopril (PRINIVIL) 20 MG Tab   • rosuvastatin (CRESTOR) 20 MG Tab   • hydrochlorothiazide (HYDRODIURIL) 25 MG Tab   • NARCAN 4 MG/0.1ML Liquid   • oxybutynin (DITROPAN) 5 MG Tab   • acetaminophen (TYLENOL) 500 MG Tab   • Dihydroxyaluminum Sod Carb (ACID RELIEF PO)     No current facility-administered medications for this visit.        Allergies:   No Known Allergies    Social Hx:   Social History     Social History   • Marital status:      Spouse name: N/A  "  • Number of children: N/A   • Years of education: N/A     Occupational History   • Not on file.     Social History Main Topics   • Smoking status: Former Smoker     Packs/day: 0.50     Years: 30.00     Types: Cigarettes     Quit date: 2/1/2011   • Smokeless tobacco: Never Used   • Alcohol use 0.5 oz/week     1 Standard drinks or equivalent per week      Comment: 2 a week    • Drug use: No   • Sexual activity: Not on file     Other Topics Concern   •  Service No   • Blood Transfusions No   • Caffeine Concern No   • Occupational Exposure No   • Hobby Hazards No   • Sleep Concern Yes   • Stress Concern No   • Weight Concern Yes   • Special Diet No   • Back Care No   • Exercise Yes   • Bike Helmet Yes   • Seat Belt Yes   • Self-Exams No     Social History Narrative   • No narrative on file         EXAMINATION     Physical Exam:   Vitals: Blood pressure 120/82, pulse 68, temperature 36.6 °C (97.8 °F), temperature source Temporal, height 1.651 m (5' 5\"), weight 94.8 kg (208 lb 15.9 oz), last menstrual period 09/10/2011, SpO2 92 %.    Constitutional:   Body Habitus: Body mass index is 34.78 kg/m².  Cooperation: Fully cooperates with exam  Appearance: Well-groomed, well-nourished, not disheveled, in no acute distress    Eyes: No scleral icterus, no proptosis     ENT -no obvious auditory deficits, no obvious tongue lesions, tongue midline, no facial droop     Skin -no rashes or lesions noted     Respiratory-  breathing comfortable on room air, no audible wheezing    Cardiovascular- capillary refills less than 2 seconds. No pitting edema in lower extremity edema is noted.     Psychiatric- alert and oriented ×3. Normal affect.     Gait - normal gait, no use of ambulatory device, nonantalgic. the patient can toe walk with ease. the patient can heel walk with ease.    Musculoskeletal -   Cervical spine   Inspection: No deformities of the skin over the cervical spine. No rashes or lesions.    full  A/P ROM in all " directions, without  pain      Spurling’s sign: negative bilaterally    No signs of muscular atrophy in bilateral upper extremities     Thoracic/Lumbar Spine/Sacral Spine/Hips   Inspection: No evidence of atrophy in bilateral lower extremities throughout     ROM: full  AROM with flexion, extension, lateral flexion, and rotation bilaterally, without pain     Palpation:   No tenderness to palpation in midline at T1-T12 levels. No tenderness to palpation in the left and right of the midline T1-L5  palpation over SI joint: positive bilaterally    palpation over buttock: negative bilaterally    palpation in hip or over the greater trochanters: positive bilaterally      Lumbar spine Special tests  Neuro tension  Straight leg test negative bilaterally        HIP  Range of motion in the hips is slightly decreased in internal and external rotation bilaterally    SI joint tests  Observation patient sits on one buttocks: Negative  NGA test positive bilaterally for anterior groin pain bilaterally        Neuro     Key points for the international standards for neurological classification of spinal cord injury (ISNCSCI) to light touch.     Dermatome R L   C4 2  2   C5 2 2   C6 2 2   C7 2 2   C8 2 2   T1 2 2   T2 2 2   L2 2 2   L3 2 2   L4 2 2   L5 2 2   S1 2 2   S2 2 2       Motor Exam Upper Extremities   ? Myotome R L   Shoulder flexion C5 5 5   Elbow flexion C5 5 5   Wrist extension C6 5 5   Elbow extension C7 5 5   Finger flexion C8 5 5   Finger abduction T1 5 5       Motor Exam Lower Extremities    ? Myotome R L   Hip flexion L2 5 5   Knee extension L3 5 5   Ankle dorsiflexion L4 5 5   Toe extension L5 5 5   Ankle plantarflexion S1 5 5       Napoles’s sign negative bilaterally   Clonus of the ankle negative bilaterally     Reflexes  ?  R L   Biceps  2+  2+   Brachioradialis  2+ 2+   Patella  2+ 2+   Achilles   2+ 2+       MEDICAL DECISION MAKING    Medical records review: see under HPI section.     DATA    Labs:   Lab  Results   Component Value Date/Time    SODIUM 136 09/05/2018 08:16 AM    POTASSIUM 4.1 09/05/2018 08:16 AM    CHLORIDE 98 09/05/2018 08:16 AM    CO2 26 09/05/2018 08:16 AM    ANION 12.0 (H) 09/05/2018 08:16 AM    GLUCOSE 113 (H) 09/05/2018 08:16 AM    BUN 17 09/05/2018 08:16 AM    CREATININE 0.93 09/05/2018 08:16 AM    CREATININE 0.72 05/12/2011 07:40 AM    CALCIUM 9.5 09/05/2018 08:16 AM    ASTSGOT 19 09/05/2018 08:16 AM    ALTSGPT 19 09/05/2018 08:16 AM    TBILIRUBIN 0.6 09/05/2018 08:16 AM    ALBUMIN 4.5 09/05/2018 08:16 AM    TOTPROTEIN 7.3 09/05/2018 08:16 AM    GLOBULIN 2.8 09/05/2018 08:16 AM    AGRATIO 1.6 09/05/2018 08:16 AM        Lab Results   Component Value Date/Time    WBC 5.2 09/05/2018 08:16 AM    WBC 6.0 05/12/2011 07:40 AM    RBC 4.49 09/05/2018 08:16 AM    RBC 4.08 05/12/2011 07:40 AM    HEMOGLOBIN 12.7 09/05/2018 08:16 AM    HEMATOCRIT 39.2 09/05/2018 08:16 AM    MCV 87.3 09/05/2018 08:16 AM    MCV 91 05/12/2011 07:40 AM    MCH 28.3 09/05/2018 08:16 AM    MCH 28.9 05/12/2011 07:40 AM    MCHC 32.4 (L) 09/05/2018 08:16 AM    MPV 11.6 09/05/2018 08:16 AM    NEUTSPOLYS 47.20 09/05/2018 08:16 AM    LYMPHOCYTES 40.60 09/05/2018 08:16 AM    MONOCYTES 9.10 09/05/2018 08:16 AM    EOSINOPHILS 1.90 09/05/2018 08:16 AM    BASOPHILS 0.80 09/05/2018 08:16 AM    HYPOCHROMIA 1+ 05/13/2014 07:20 AM    ANISOCYTOSIS 2+ 09/28/2011 01:16 PM        Lab Results   Component Value Date/Time    HBA1C 6.2 (H) 09/05/2018 08:16 AM        Imaging: I personally reviewed following images, these are my reads  MRI lumbar 08/29/2018:  There is note of mild multilevel degenerative disc disease and facet arthropathy.  Mild neural foraminal narrowing is noted L4-5 and L5-S1 bilaterally.  There is no evidence for central or lateral recess stenosis in the lumbar spine.  The facet arthropathy is worst at L4-5 bilaterally.    Xray hips 09/07/2018:  There is note of moderate degenerative changes of the hips bilaterally.  Findings are  suggestive of PATRICIA.    IMAGING radiology reads. I reviewed the following radiology reads                                        Results for orders placed during the hospital encounter of 08/28/18   MR-LUMBAR SPINE-W/O    Impression Multilevel degenerative disc disease and facet degeneration. No central canal narrowing. There is mild neural foraminal narrowing extending from L3-4 through L5-S1 bilaterally.                                         Results for orders placed during the hospital encounter of 06/20/11   MR-PELVIS-WITH & W/O AND SEQUENCES    Impression 1. Abnormal thickening of the junctional zone as well is a few punctate areas of increased T2 signal peripherally in the uterine wall are identified.  Some heterogeneous decreased signal is also noted in the posterior uterine wall.  These findings are highly suggestive of adenomyosis.    2. Two small nabothian cysts are noted in the cervix.    3. No ovarian cyst or mass is identified at this time.    4. Endometrial stripe measures 1.6 cm in maximum thickness.  If the patient is postmenopausal, then endometrial carcinoma or endometrial hyperplasia cannot be ruled out.  The endometrial stripe appears uniform in signal with no abnormal enhancement.                                                           Diagnosis   Visit Diagnoses     ICD-10-CM   1. Primary osteoarthritis of both hips M16.0   2. Lumbar spondylosis M47.816   3. Sacral dysfunction M53.3       ASSESSMENT:  Fadumo Adair 62 y.o. female with hip osteoarthritis.     Fadumo was seen today for new patient.    Diagnoses and all orders for this visit:    Primary osteoarthritis of both hips  -     DX-HIP-BILATERAL-WITH PELVIS-3/4 VIEWS; Future  -     PAIN MANAGEMENT SCRN, UR; Future  -     REFERRAL TO PHYSICAL THERAPY Reason for Therapy: Eval/Treat/Report    Lumbar spondylosis  -     PAIN MANAGEMENT SCRN, UR; Future  -     REFERRAL TO PHYSICAL THERAPY Reason for Therapy: Eval/Treat/Report    Sacral  dysfunction  -     REFERRAL TO PHYSICAL THERAPY Reason for Therapy: Eval/Treat/Report    We discussed her exam findings as well as imaging of the lumbar spine and hips.  See above.  It is likely that she is symptomatic both from the hips and from her lumbar spine.  We discussed treatment options.  Since she has not had physical therapy yet, we discussed a trial of physical therapy.  Injections of the hips might be considered.    I have considered recommending a trial of duloxetine, but given that she is taking tramadol, I have not done this today.  We discussed trial of turmeric, she will likely get a [employer] brand.  We discussed that this should be 1500mg-2000mg in divided doses.     We discussed briefly her combination of medications.  Ideally, I would like to see these be decreased and/or change medications so that she does not have a combination of benzodiazepine with ambien and tramadol.  For now, I did not change medications.    Follow-up: 4-6 weeks    Please note that this dictation was created using voice recognition software. I have made every reasonable attempt to correct obvious errors but there may be errors of grammar and content that I may have overlooked prior to finalization of this note.      Meet Barrios MD  Physical Medicine and Rehabilitation  Interventional Spine and Sports Physiatry  [facility]       CC Ursula Mccord A

## 2018-09-27 ENCOUNTER — HOSPITAL ENCOUNTER (OUTPATIENT)
Dept: LAB | Facility: MEDICAL CENTER | Age: 63
End: 2018-09-27
Attending: UROLOGY
Payer: COMMERCIAL

## 2018-09-27 LAB — CYTOLOGY REG CYTOL: NORMAL

## 2018-09-27 PROCEDURE — 88112 CYTOPATH CELL ENHANCE TECH: CPT

## 2018-09-27 PROCEDURE — 87086 URINE CULTURE/COLONY COUNT: CPT

## 2018-09-28 LAB
AMBIGUOUS DTTM AMBI4: NORMAL
SIGNIFICANT IND 70042: NORMAL
SITE SITE: NORMAL
SOURCE SOURCE: NORMAL

## 2018-09-29 LAB
BACTERIA UR CULT: NORMAL
SIGNIFICANT IND 70042: NORMAL
SITE SITE: NORMAL
SOURCE SOURCE: NORMAL

## 2018-10-16 ENCOUNTER — HOSPITAL ENCOUNTER (OUTPATIENT)
Dept: RADIOLOGY | Facility: MEDICAL CENTER | Age: 63
End: 2018-10-16
Attending: PHYSICAL MEDICINE & REHABILITATION
Payer: COMMERCIAL

## 2018-10-16 DIAGNOSIS — M16.0 PRIMARY OSTEOARTHRITIS OF BOTH HIPS: ICD-10-CM

## 2018-10-16 PROCEDURE — 73522 X-RAY EXAM HIPS BI 3-4 VIEWS: CPT

## 2018-10-19 ENCOUNTER — OFFICE VISIT (OUTPATIENT)
Dept: PHYSICAL MEDICINE AND REHAB | Facility: MEDICAL CENTER | Age: 63
End: 2018-10-19
Payer: COMMERCIAL

## 2018-10-19 ENCOUNTER — PHYSICAL THERAPY (OUTPATIENT)
Dept: PHYSICAL THERAPY | Facility: REHABILITATION | Age: 63
End: 2018-10-19
Attending: PHYSICAL MEDICINE & REHABILITATION
Payer: COMMERCIAL

## 2018-10-19 VITALS
BODY MASS INDEX: 36.58 KG/M2 | DIASTOLIC BLOOD PRESSURE: 82 MMHG | SYSTOLIC BLOOD PRESSURE: 124 MMHG | OXYGEN SATURATION: 93 % | WEIGHT: 214.29 LBS | HEIGHT: 64 IN | TEMPERATURE: 97.7 F | HEART RATE: 98 BPM

## 2018-10-19 DIAGNOSIS — E55.9 VITAMIN D DEFICIENCY: ICD-10-CM

## 2018-10-19 DIAGNOSIS — M53.3 SACRAL DYSFUNCTION: ICD-10-CM

## 2018-10-19 DIAGNOSIS — M47.816 LUMBAR SPONDYLOSIS: ICD-10-CM

## 2018-10-19 DIAGNOSIS — M16.0 PRIMARY OSTEOARTHRITIS OF BOTH HIPS: ICD-10-CM

## 2018-10-19 DIAGNOSIS — M47.818 ARTHRITIS OF SACROILIAC JOINT OF BOTH SIDES: ICD-10-CM

## 2018-10-19 PROCEDURE — 99214 OFFICE O/P EST MOD 30 MIN: CPT | Performed by: PHYSICAL MEDICINE & REHABILITATION

## 2018-10-19 PROCEDURE — 97161 PT EVAL LOW COMPLEX 20 MIN: CPT

## 2018-10-19 PROCEDURE — 97110 THERAPEUTIC EXERCISES: CPT

## 2018-10-19 RX ORDER — VIT C/B6/B5/MAGNESIUM/HERB 173 50-5-6-5MG
CAPSULE ORAL
COMMUNITY
End: 2020-07-16

## 2018-10-19 SDOH — ECONOMIC STABILITY: GENERAL: QUALITY OF LIFE: FAIR

## 2018-10-19 ASSESSMENT — ENCOUNTER SYMPTOMS
PAIN SCALE AT HIGHEST: 8
PAIN SCALE: 2
PAIN SCALE AT LOWEST: 2

## 2018-10-19 ASSESSMENT — PAIN SCALES - GENERAL: PAINLEVEL: 4=SLIGHT-MODERATE PAIN

## 2018-10-19 NOTE — PROGRESS NOTES
Follow-up patient note    Physiatry (physical medicine and  Rehabilitation), interventional spine and sports medicine    Date of Service: 10/19/2018    Chief complaint: Low back and lateral leg pain    HISTORY    HPI: Fadumo Adair 62 y.o. female who presents today for evaluation of pain lateral hips and low back.  Her symptoms are the same.  Walking distances seems to aggravate in the low back and groin.  This is an achy pain.  Resting for a few minutes helps temporarily.  PT starts today.  No new symptoms, denies radicular symptoms.    She is taking turmeric and is not sure if it is helping yet.     She has been working out more, riding her bicycle about 10-12 miles twice a week and going to exercise classes.      Medical records review:  I reviewed the note from the referring provider Ursula Mccord A.     Previous treatments:    Physical Therapy: Yes     Medications the patient is tried: NSAIDs, tramadol and tylenol #3, salonpas patches    Previous interventions or surgeries: none      ROS:   CV: HTN  : Bladder cancer    Red Flags ROS:   Fever, Chills, Sweats: Denies  Involuntary Weight Loss: Denies  Bladder Incontinence: Denies  Bowel Incontinence: denies  Saddle Anesthesia: Denies    PMHx:   Past Medical History:   Diagnosis Date   • Anemia    • Arrhythmia     mild, pt not sure problem, does not see cardiologist    • Arthritis     hip, knees, back   • Cancer (HCC)     bladder   • Heart burn    • High cholesterol    • HTN    • Hyperlipemia    • Indigestion    • Low back pain 09-28-11    hips, and knees   • Urinary bladder disorder        PSHx:   Past Surgical History:   Procedure Laterality Date   • TRANS URETHRAL RESECTION BLADDER  5/8/2018    Procedure: TRANS URETHRAL RESECTION BLADDER- TUMOR;  Surgeon: Sigifredo Montes M.D.;  Location: [facility];  Service: Urology   • CYSTOSCOPY  10/11/2011    Performed by MOLINA LOGAN at [facility]   • VAGINAL PERINEAL EXAM  REPAIR  10/11/2011    Performed by MOLINA LOGAN at SURGERY [facility]   • HYSTERECTOMY ROBOTIC  10/11/2011    Performed by MOLINA LOGAN at SURGERY [facility]   • PRIMARY C SECTION      x  2   • TONSILLECTOMY AND ADENOIDECTOMY      age 7       Family history   Family History   Problem Relation Age of Onset   • Cancer Mother    • Heart Disease Father          Medications:   Current Outpatient Prescriptions   Medication   • Turmeric 500 MG Cap   • ALPRAZolam (XANAX) 0.5 MG Tab   • tramadol (ULTRAM) 50 MG Tab   • vitamin D (CHOLECALCIFEROL) 1000 UNIT Tab   • coenzyme Q-10 30 MG capsule   • lisinopril (PRINIVIL) 20 MG Tab   • rosuvastatin (CRESTOR) 20 MG Tab   • acetaminophen (TYLENOL) 500 MG Tab   • hydrochlorothiazide (HYDRODIURIL) 25 MG Tab   • NARCAN 4 MG/0.1ML Liquid   • oxybutynin (DITROPAN) 5 MG Tab   • phenazopyridine (PYRIDIUM) 200 MG Tab   • Dihydroxyaluminum Sod Carb (ACID RELIEF PO)     No current facility-administered medications for this visit.        Allergies:   No Known Allergies    Social Hx:   Social History     Social History   • Marital status:      Spouse name: N/A   • Number of children: N/A   • Years of education: N/A     Occupational History   • Not on file.     Social History Main Topics   • Smoking status: Former Smoker     Packs/day: 0.50     Years: 30.00     Types: Cigarettes     Quit date: 2/1/2011   • Smokeless tobacco: Never Used   • Alcohol use 0.5 oz/week     1 Standard drinks or equivalent per week      Comment: 2 a week    • Drug use: No   • Sexual activity: Not on file     Other Topics Concern   •  Service No   • Blood Transfusions No   • Caffeine Concern No   • Occupational Exposure No   • Hobby Hazards No   • Sleep Concern Yes   • Stress Concern No   • Weight Concern Yes   • Special Diet No   • Back Care No   • Exercise Yes   • Bike Helmet Yes   • Seat Belt Yes   • Self-Exams No     Social History Narrative   • No narrative on file  "        EXAMINATION     Physical Exam:   Vitals: Blood pressure 124/82, pulse 98, temperature 36.5 °C (97.7 °F), temperature source Temporal, height 1.626 m (5' 4\"), weight 97.2 kg (214 lb 4.6 oz), last menstrual period 09/10/2011, SpO2 93 %.    Constitutional:   Body Habitus: Body mass index is 36.78 kg/m².  Cooperation: Fully cooperates with exam  Appearance: Well-groomed, well-nourished, not disheveled, in no acute distress    Eyes: No scleral icterus, no proptosis     ENT -no obvious auditory deficits, no obvious tongue lesions, tongue midline, no facial droop     Skin -no rashes or lesions noted     Respiratory-  breathing comfortable on room air, no audible wheezing    Cardiovascular- No pitting edema in lower extremity edema is noted.     Psychiatric- alert and oriented ×3. Normal affect.     Gait - normal gait, no use of ambulatory device, nonantalgic.    Musculoskeletal -   Thoracic/Lumbar Spine/Sacral Spine/Hips   Inspection: No evidence of atrophy in bilateral lower extremities throughout   Palpation:   palpation over SI joint: positive bilaterally    palpation in hip or over the greater trochanters: positive bilaterally      Neuro     Key points for the international standards for neurological classification of spinal cord injury (ISNCSCI) to light touch.     Dermatome R L   L2 2 2   L3 2 2   L4 2 2   L5 2 2   S1 2 2   S2 2 2       Motor Exam Lower Extremities    ? Myotome R L   Hip flexion L2 5 5   Knee extension L3 5 5   Ankle dorsiflexion L4 5 5   Toe extension L5 5 5   Ankle plantarflexion S1 5 5        Clonus of the ankle negative bilaterally     Reflexes  ?  R L   Patella  2+ 2+   Achilles   2+ 2+       MEDICAL DECISION MAKING    Medical records review: see under HPI section.     DATA    Labs: No new labs to review  Lab Results   Component Value Date/Time    SODIUM 136 09/05/2018 08:16 AM    POTASSIUM 4.1 09/05/2018 08:16 AM    CHLORIDE 98 09/05/2018 08:16 AM    CO2 26 09/05/2018 08:16 AM    ANION " 12.0 (H) 09/05/2018 08:16 AM    GLUCOSE 113 (H) 09/05/2018 08:16 AM    BUN 17 09/05/2018 08:16 AM    CREATININE 0.93 09/05/2018 08:16 AM    CREATININE 0.72 05/12/2011 07:40 AM    CALCIUM 9.5 09/05/2018 08:16 AM    ASTSGOT 19 09/05/2018 08:16 AM    ALTSGPT 19 09/05/2018 08:16 AM    TBILIRUBIN 0.6 09/05/2018 08:16 AM    ALBUMIN 4.5 09/05/2018 08:16 AM    TOTPROTEIN 7.3 09/05/2018 08:16 AM    GLOBULIN 2.8 09/05/2018 08:16 AM    AGRATIO 1.6 09/05/2018 08:16 AM        Lab Results   Component Value Date/Time    WBC 5.2 09/05/2018 08:16 AM    WBC 6.0 05/12/2011 07:40 AM    RBC 4.49 09/05/2018 08:16 AM    RBC 4.08 05/12/2011 07:40 AM    HEMOGLOBIN 12.7 09/05/2018 08:16 AM    HEMATOCRIT 39.2 09/05/2018 08:16 AM    MCV 87.3 09/05/2018 08:16 AM    MCV 91 05/12/2011 07:40 AM    MCH 28.3 09/05/2018 08:16 AM    MCH 28.9 05/12/2011 07:40 AM    MCHC 32.4 (L) 09/05/2018 08:16 AM    MPV 11.6 09/05/2018 08:16 AM    NEUTSPOLYS 47.20 09/05/2018 08:16 AM    LYMPHOCYTES 40.60 09/05/2018 08:16 AM    MONOCYTES 9.10 09/05/2018 08:16 AM    EOSINOPHILS 1.90 09/05/2018 08:16 AM    BASOPHILS 0.80 09/05/2018 08:16 AM    HYPOCHROMIA 1+ 05/13/2014 07:20 AM    ANISOCYTOSIS 2+ 09/28/2011 01:16 PM        Lab Results   Component Value Date/Time    HBA1C 6.2 (H) 09/05/2018 08:16 AM        Imaging: I personally reviewed following images, these are my reads  Xrays hips 10/16/2018  There is note of mild hip arthrosis and also mild bilateral sacroiliac arthrosis.  Lumbosacral spondylosis is also noted on films.    MRI lumbar 08/29/2018:  There is note of mild multilevel degenerative disc disease and facet arthropathy.  Mild neural foraminal narrowing is noted L4-5 and L5-S1 bilaterally.  There is no evidence for central or lateral recess stenosis in the lumbar spine.  The facet arthropathy is worst at L4-5 bilaterally.    Xray hips 09/07/2017:  There is note of moderate degenerative changes of the hips bilaterally.  Findings are suggestive of  PATRICIA.    IMAGING radiology reads. I reviewed the following radiology reads   Xray hips 10/16/2018  Mild, symmetric bilateral hip osteoarthrosis                                         Results for orders placed during the hospital encounter of 08/28/18   MR-LUMBAR SPINE-W/O    Impression Multilevel degenerative disc disease and facet degeneration. No central canal narrowing. There is mild neural foraminal narrowing extending from L3-4 through L5-S1 bilaterally.                                                                                          Diagnosis   Visit Diagnoses     ICD-10-CM   1. Primary osteoarthritis of both hips M16.0   2. Vitamin D deficiency E55.9   3. Sacral dysfunction M53.3   4. 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 female with hip osteoarthritis.     Fadumo was seen today for new patient.    Diagnoses and all orders for this visit:    Primary osteoarthritis of both hip  Vitamin D deficiency  Sacral dysfunction  Arthritis of sacroiliac joints bilaterally    She starts PT today. Injections of the hips or sacroiliac joints might be considered depending on how she does with PT.    We discussed trial of turmeric 1500mg in divided doses.  Plan to give this a several month trial.    Plan to check vitamin D level.  She reports that this has been low in the past.    We discussed briefly her combination of medications.  Ideally, I would like to see these be decreased and/or change medications so that she does not have a combination of benzodiazepine with ambien and tramadol.  For now, I did not change medications.    Follow-up: 4-6 weeks    Please note that this dictation was created using voice recognition software. I have made every reasonable attempt to correct obvious errors but there may be errors of grammar and content that I may have overlooked prior to finalization of this note.      Meet Barrios MD  Physical Medicine and  Rehabilitation  Interventional Spine and Sports Physiatry  [facility]       CC Ursula Mccord, A

## 2018-10-19 NOTE — OP THERAPY EVALUATION
Outpatient Physical Therapy  INITIAL EVALUATION    [facility] [place]  [address]  [place] [address]  Phone:  [phone]  Fax:  [phone]    Date of Evaluation: 10/19/2018    Patient: Fadumo Adair  YOB: 1955  MRN: [id number]     Referring Provider: Meet Barrios M.D.  [address] [facility]  [address]  [place] [address]   Referring Diagnosis Primary osteoarthritis of both hips [M16.0];Lumbar spondylosis [M47.816];Sacral dysfunction [M53.3]     Time Calculation  Start time: 1030  Stop time: 1130 Time Calculation (min): 60 minutes     Physical Therapy Occurrence Codes    Date of onset of impairment:  10/19/17   Date physical therapy care plan established or reviewed:  10/19/18   Date physical therapy treatment started:  10/19/18          Chief Complaint: Back Problem and Hip Problem    Visit Diagnoses     ICD-10-CM   1. Primary osteoarthritis of both hips M16.0   2. Lumbar spondylosis M47.816   3. Sacral dysfunction M53.3         Subjective:   History of Present Illness:     Date of onset:  10/19/2017  Quality of life:  Fair  Prior level of function:  Ongoing lower back pain and leg pain  Pain:     Current pain ratin    At best pain ratin    At worst pain ratin  Diagnostic Tests:     X-ray: abnormal      MRI studies: abnormal    Activities of Daily Living:     Patient reported ADL status: Limited with ambulation  Limited with standing  Limited with donning footwear  Patient Goals:     Patient goals for therapy:  Increased strength, decreased pain and increased motion    Patient is a 62 y.o. female that presents to therapy with .cheif complaint. States that symptoms were insidious in onset. Reports the pain quality to be sharp/dull, constant and are primarily midline lower back and groin B. Reports that symptoms now worsening. States that aggravating factors are walking, sitting still, standing.  States that easng factors are bike, meds,  modalities.   Denies red flags.   Past Medical History:   Diagnosis Date   • Anemia    • Arrhythmia     mild, pt not sure problem, does not see cardiologist    • Arthritis     hip, knees, back   • Cancer (HCC)     bladder   • Heart burn    • High cholesterol    • HTN    • Hyperlipemia    • Indigestion    • Low back pain 09-28-11    hips, and knees   • Urinary bladder disorder      Past Surgical History:   Procedure Laterality Date   • TRANS URETHRAL RESECTION BLADDER  5/8/2018    Procedure: TRANS URETHRAL RESECTION BLADDER- TUMOR;  Surgeon: Sigifredo Montes M.D.;  Location: SURGERY [facility];  Service: Urology   • CYSTOSCOPY  10/11/2011    Performed by MOLINA LOGAN at SURGERY [facility]   • VAGINAL PERINEAL EXAM REPAIR  10/11/2011    Performed by MOLINA LOGAN at SURGERY [facility]   • HYSTERECTOMY ROBOTIC  10/11/2011    Performed by MOLINA LOGAN at [facility]   • PRIMARY C SECTION      x  2   • TONSILLECTOMY AND ADENOIDECTOMY      age 7     Social History   Substance Use Topics   • Smoking status: Former Smoker     Packs/day: 0.50     Years: 30.00     Types: Cigarettes     Quit date: 2/1/2011   • Smokeless tobacco: Never Used   • Alcohol use 0.5 oz/week     1 Standard drinks or equivalent per week      Comment: 2 a week      Family and Occupational History     Social History   • Marital status:      Spouse name: N/A   • Number of children: N/A   • Years of education: N/A       Objective     Postural Observations  Seated posture: poor  Standing posture: poor  Correction of posture: has no consistent effect        Neurological Testing     Reflexes   Left   Patellar (L4): normal (2+)  Achilles (S1): normal (2+)  Ankle clonus reflex: negative  Babinski sign: negative    Right   Patellar (L4): trace (1+)  Achilles (S1): trace (1+)  Ankle clonus reflex: negative  Babinski sign: negative    Myotome testing   Lumbar (left)   L1 (hip flexors): 5  L2 (hip flexors):  5  L3 (knee extensors): 4+  L4 (ankle dorsiflexors): 5  L5 (great toe extension): 5  S1 (ankle plantar flexors): 5    Lumbar (right)   L1 (hip flexors): 5  L2 (hip flexors): 5  L3 (knee extensors): 5  L4 (ankle dorsiflexors): 5  L5 (great toe extension): 5  S1 (ankle plantar flexors): 5    Dermatome testing   Lumbar (left)   All left lumbar dermatomes intact    Lumbar (right)   All right lumbar dermatomes intact    Active Range of Motion     Lumbar   Flexion: Lumbar active flexion: 115deg.  Extension: Lumbar active extension: 30deg.  Left lateral flexion: Left lateral lumbar spine flexion: 40deg.  Right lateral flexion: Right lateral lumbar spine flexion: 40deg.    Passive Range of Motion   Left Hip   Flexion: 90 degrees   External rotation (90/90): 25 degrees   Internal rotation (90/90): 9 degrees     Right Hip   Flexion: 105 degrees   External rotation (90/90): 45 degrees   Internal rotation (90/90): 18 degrees     Strength:      Left Hip   Planes of Motion   Abduction: 4  Adduction: 5    Right Hip   Planes of Motion   Abduction: 5  Adduction: 5    Left Knee   Flexion: 5    Right Knee   Flexion: 5    Tests     Left Hip   Positive NGA, FADIR and scour.   Negative Gaenslen's and piriformis.   SLR: Negative.     Right Hip   Positive NGA and FADIR.   Negative Gaenslen's, piriformis, scour, SI compression and SI distraction.   SLR: Negative.     Elaine LumbarTest     Lying repeated motions:   Pre-test symptoms: 1/10 midline  Extension in lying     Symptoms during testing: produces    Symptoms after testing: worse  Repeat extension in lying     Symptoms during testing: produces    Symptoms after testing: worse        Therapeutic Exercises (CPT 06815):     1. Kneeling hip flexor stretch, [id number]    2. Basic TrA , x5min      Time-based treatments/modalities:  Therapeutic exercise minutes (CPT 41623): 10 minutes       Assessment, Response and Plan:   Impairments: abnormal muscle firing, abnormal or restricted ROM,  impaired physical strength, limited mobility and pain with function    Assessment details:  Patient presents with signs and symptoms consistent with hip OA and impingement with lumbar spondylosis. Patient limitations include weakness, decreased ROM, and pain. Patient demonstrated no directional prefrence. Patient will benefit from therapy to improve the aforementioned deficits and decrease further functional decline.   Goals:   Short Term Goals:   1) Patient's L hip abd strength will improve by a half muscle grade to facilitate improved gait.  2) Patient's symptoms will improve to allow for standing >20min.  Short term goal time span:  2-4 weeks      Long Term Goals:    1) Patient's symptoms will improve to allow for fitness walking >1/2 mile.  2) Patient's LEFS will improve by 15 to demonstrate functional improvement  Long term goal time span:  6-8 weeks    Plan:   Therapy options:  Physical therapy treatment to continue  Planned therapy interventions:  E Stim Unattended (CPT 94667), Hot or Cold Pack Therapy (CPT 59842), Manual Therapy (CPT 26860), Neuromuscular Re-education (CPT 57253) and Therapeutic Exercise (CPT 79714)  Frequency:  2x week  Duration in weeks:  8  Discussed with:  Patient      Functional Limitation G-Codes and Severity Modifiers  PT Functional Assessment Tool Used: LBDI  PT Functional Assessment Score: 46/37     Referring provider co-signature:  I have reviewed this plan of care and my co-signature certifies the need for services.  Certification Dates:   From 10/19/18     To 12/14/18    Physician Signature: ________________________________ Date: ______________

## 2018-10-24 ENCOUNTER — PHYSICAL THERAPY (OUTPATIENT)
Dept: PHYSICAL THERAPY | Facility: REHABILITATION | Age: 63
End: 2018-10-24
Attending: PHYSICAL MEDICINE & REHABILITATION
Payer: COMMERCIAL

## 2018-10-24 DIAGNOSIS — M53.3 SACRAL DYSFUNCTION: ICD-10-CM

## 2018-10-24 DIAGNOSIS — M47.816 LUMBAR SPONDYLOSIS: ICD-10-CM

## 2018-10-24 DIAGNOSIS — M16.0 PRIMARY OSTEOARTHRITIS OF BOTH HIPS: ICD-10-CM

## 2018-10-24 PROCEDURE — 97110 THERAPEUTIC EXERCISES: CPT

## 2018-10-24 PROCEDURE — 97112 NEUROMUSCULAR REEDUCATION: CPT

## 2018-10-26 ENCOUNTER — PHYSICAL THERAPY (OUTPATIENT)
Dept: PHYSICAL THERAPY | Facility: REHABILITATION | Age: 63
End: 2018-10-26
Attending: PHYSICAL MEDICINE & REHABILITATION
Payer: COMMERCIAL

## 2018-10-26 DIAGNOSIS — M47.816 LUMBAR SPONDYLOSIS: ICD-10-CM

## 2018-10-26 DIAGNOSIS — M16.0 PRIMARY OSTEOARTHRITIS OF BOTH HIPS: ICD-10-CM

## 2018-10-26 DIAGNOSIS — M53.3 SACRAL DYSFUNCTION: ICD-10-CM

## 2018-10-26 PROCEDURE — 97110 THERAPEUTIC EXERCISES: CPT

## 2018-10-26 PROCEDURE — 97112 NEUROMUSCULAR REEDUCATION: CPT

## 2018-10-26 NOTE — OP THERAPY DAILY TREATMENT
Outpatient Physical Therapy  DAILY TREATMENT     [facility] [place]  [address], [address]  [place] [address]  Phone:  [phone]  Fax:  [phone]    Date: 10/26/2018    Patient: Fadumo Adair  YOB: 1955  MRN: [id number]     Time Calculation  Start time: 0823  Stop time: 0901 Time Calculation (min): 38 minutes     Chief Complaint: Back Problem and Hip Problem    Visit #: 3    SUBJECTIVE:  Patient reports that her hips feel slightly looser. Notes maybe a slight decrease in pain.     OBJECTIVE:  Current objective measures:   Discontinue extension due to increased pain.          Therapeutic Exercises (CPT 96424):     1. Bike, x8min    2. Press ups , D/C    3. 1/2 kneel hip flexor stretch, [id number]    4. Clams, x fatigue B    5. Seated hip abd L2, x fatigue B    6. Basic TrA, x10min    7. Basic TrA LE lifts, x30    8. Bridge, 3x5    9. TrA x5 handout, x5min    Therapeutic Treatments and Modalities:     1. Neuromuscular Re-education (CPT 42632), foam pad balance; 1/2 foam roll balance with head turns    Time-based treatments/modalities:  Therapeutic exercise minutes (CPT 69552): 30 minutes  Neuromusc re-ed, balance, coor, post minutes (CPT 12362): 8 minutes       Pain rating before treatment: 1  Pain rating after treatment: 1    ASSESSMENT:   Response to treatment: Patient responded fair to therapy with no increase in pain and a reported increase in hip ROM.     PLAN/RECOMMENDATIONS:   Plan for treatment: therapy treatment to continue next visit.  Planned interventions for next visit: E-stim unattended (CPT 00237), manual therapy (CPT 51822), neuromuscular re-education (CPT 79185) and therapeutic exercise (CPT 10099).

## 2018-10-30 ENCOUNTER — APPOINTMENT (OUTPATIENT)
Dept: PHYSICAL THERAPY | Facility: REHABILITATION | Age: 63
End: 2018-10-30
Attending: PHYSICAL MEDICINE & REHABILITATION
Payer: COMMERCIAL

## 2018-11-05 ENCOUNTER — PHYSICAL THERAPY (OUTPATIENT)
Dept: PHYSICAL THERAPY | Facility: REHABILITATION | Age: 63
End: 2018-11-05
Attending: PHYSICAL MEDICINE & REHABILITATION
Payer: COMMERCIAL

## 2018-11-05 DIAGNOSIS — M16.0 PRIMARY OSTEOARTHRITIS OF BOTH HIPS: ICD-10-CM

## 2018-11-05 DIAGNOSIS — M53.3 SACRAL DYSFUNCTION: ICD-10-CM

## 2018-11-05 DIAGNOSIS — M47.816 LUMBAR SPONDYLOSIS: ICD-10-CM

## 2018-11-05 PROCEDURE — 97112 NEUROMUSCULAR REEDUCATION: CPT

## 2018-11-05 PROCEDURE — 97110 THERAPEUTIC EXERCISES: CPT

## 2018-11-05 NOTE — OP THERAPY DAILY TREATMENT
Outpatient Physical Therapy  DAILY TREATMENT     [facility] [place]  [address], [address]  [facility] [place] [address]  Phone:  [phone]  Fax:  [phone]    Date: 11/05/2018    Patient: Fadumo Adair  YOB: 1955  MRN: [id number]     Time Calculation  Start time: 0823  Stop time: 0901 Time Calculation (min): 38 minutes     Chief Complaint: Back Problem    Visit #: 4    SUBJECTIVE:  Patient reports a little less pain with activity and less intense pain.     OBJECTIVE:  Current objective measures:      Strength:       Left Hip   Planes of Motion   Abduction: 4  Adduction: 5     Right Hip   Planes of Motion   Abduction: 5  Adduction: 5      Therapeutic Exercises (CPT 85764):     1. Bike, x8min    2. Basic TrA with Ball roll, x20    3. 1/2 kneel hip flexor stretch, [id number]    4. Clams, x fatigue B    5. Seated hip abd L2, x fatigue B    6. Basic TrA, x15min    7. Basic TrA LE lifts, x30    8. Bridge, 3x5    Therapeutic Treatments and Modalities:     1. Neuromuscular Re-education (CPT 67783), 1/2 foam roll balance with head turns; advance to S-S    Time-based treatments/modalities:  Therapeutic exercise minutes (CPT 08467): 30 minutes  Neuromusc re-ed, balance, coor, post minutes (CPT 39871): 8 minutes       Pain rating before treatment: 1  Pain rating after treatment: 0    ASSESSMENT:   Response to treatment: Patient responded well to therapy with an overall improvement in TrA control. Patient will continue with more advanced ex as she demonstrates an improvement in control.     PLAN/RECOMMENDATIONS:   Plan for treatment: therapy treatment to continue next visit.  Planned interventions for next visit: E-stim unattended (CPT 37814), manual therapy (CPT 43839), neuromuscular re-education (CPT 77973) and therapeutic exercise (CPT 59265).

## 2018-11-06 ENCOUNTER — APPOINTMENT (OUTPATIENT)
Dept: PHYSICAL THERAPY | Facility: REHABILITATION | Age: 63
End: 2018-11-06
Attending: PHYSICAL MEDICINE & REHABILITATION
Payer: COMMERCIAL

## 2018-11-08 ENCOUNTER — APPOINTMENT (OUTPATIENT)
Dept: PHYSICAL THERAPY | Facility: REHABILITATION | Age: 63
End: 2018-11-08
Attending: PHYSICAL MEDICINE & REHABILITATION
Payer: COMMERCIAL

## 2018-11-12 ENCOUNTER — PHYSICAL THERAPY (OUTPATIENT)
Dept: PHYSICAL THERAPY | Facility: REHABILITATION | Age: 63
End: 2018-11-12
Attending: PHYSICAL MEDICINE & REHABILITATION
Payer: COMMERCIAL

## 2018-11-12 DIAGNOSIS — M47.816 LUMBAR SPONDYLOSIS: ICD-10-CM

## 2018-11-12 DIAGNOSIS — M53.3 SACRAL DYSFUNCTION: ICD-10-CM

## 2018-11-12 DIAGNOSIS — M16.0 PRIMARY OSTEOARTHRITIS OF BOTH HIPS: ICD-10-CM

## 2018-11-12 PROCEDURE — 97110 THERAPEUTIC EXERCISES: CPT

## 2018-11-13 ENCOUNTER — APPOINTMENT (OUTPATIENT)
Dept: PHYSICAL THERAPY | Facility: REHABILITATION | Age: 63
End: 2018-11-13
Attending: PHYSICAL MEDICINE & REHABILITATION
Payer: COMMERCIAL

## 2018-11-13 NOTE — OP THERAPY DISCHARGE SUMMARY
Outpatient Physical Therapy  DISCHARGE SUMMARY NOTE      [facility] [place]  [address]  [place] [address]  Phone:  [phone]  Fax:  [phone]    Date of Visit: 11/12/2018    Patient: Fadumo Adair  YOB: 1955  MRN: [id number]     Referring Provider: Meet Barrios M.D.  [address]  [address]  [place] [address]   Referring Diagnosis Bilateral primary osteoarthritis of hip [M16.0]     Physical Therapy Occurrence Codes    Date of onset of impairment:  10/19/17   Date physical therapy care plan established or reviewed:  10/19/18   Date physical therapy treatment started:  10/19/18          Functional Limitation G-Codes and Severity Modifiers  PT Functional Assessment Tool Used: LBDI/LEFS  PT Functional Assessment Score: 32/58   Short Term Goals:   1) Patient's L hip abd strength will improve by a half muscle grade to facilitate improved gait. MET  2) Patient's symptoms will improve to allow for standing >20min. MET  Short term goal time span:  2-4 weeks      Long Term Goals:    1) Patient's symptoms will improve to allow for fitness walking >1/2 mile. Not met  2) Patient's LEFS will improve by 15 to demonstrate functional improvement MET  Long term goal time span:  6-8 weeks  Your patient is being discharged from Physical Therapy with the following comments:   · Goals partially met    Comments:  Patient to continue with HEP     Limitations Remaining:  Continued back and hip pain    Recommendations:  Discharge to HEP    Carlton Sigala, PT, DPT    Date: 11/12/2018

## 2018-11-13 NOTE — OP THERAPY DAILY TREATMENT
Outpatient Physical Therapy  DAILY TREATMENT     [facility] [place]  [address]  [place] [address]  Phone:  [phone]  Fax:  [phone]    Date: 11/12/2018    Patient: Fadumo Adair  YOB: 1955  MRN: [id number]     Time Calculation  Start time: 1530  Stop time: 1600 Time Calculation (min): 30 minutes     Chief Complaint: Back Problem and Hip Problem    Visit #: 5    SUBJECTIVE:  Patient feels that she has a good handle on her symptoms. Notes improvement and states she will continue with HEP.     OBJECTIVE:  Current objective measures: Hip abd 4+/5 B  PT Functional Assessment Tool Used: LBDI/LEFS  PT Functional Assessment Score: 32/58       Therapeutic Exercises (CPT 51978):     1. As HEP review    2. Basic TrA with Ball roll, x20    3. 1/2 kneel hip flexor stretch, [id number]    4. Clams, x fatigue B    5. Seated hip abd L2, x fatigue B    6. Basic TrA, x15min    7. Basic TrA LE lifts, x30    8. Bridge, 3x5    Therapeutic Treatments and Modalities:     1. Neuromuscular Re-education (CPT 87352), 1/2 foam roll balance with head turns; advance to S-S    Time-based treatments/modalities:  Therapeutic exercise minutes (CPT 56441): 25 minutes  Neuromusc re-ed, balance, coor, post minutes (CPT 23432): 5 minutes       Pain rating before treatment: 1  Pain rating after treatment: 1    ASSESSMENT:   Response to treatment: Patient has responded fair to HEP with slight increase in function and decrease in pain. Patient is to discharge to full HEP.     PLAN/RECOMMENDATIONS:   Plan for treatment: discharge patient due to accomplished goals.  Planned interventions for next visit: Discharge.

## 2018-11-15 ENCOUNTER — APPOINTMENT (OUTPATIENT)
Dept: PHYSICAL THERAPY | Facility: REHABILITATION | Age: 63
End: 2018-11-15
Attending: PHYSICAL MEDICINE & REHABILITATION
Payer: COMMERCIAL

## 2018-11-20 ENCOUNTER — OFFICE VISIT (OUTPATIENT)
Dept: PHYSICAL MEDICINE AND REHAB | Facility: MEDICAL CENTER | Age: 63
End: 2018-11-20
Payer: COMMERCIAL

## 2018-11-20 VITALS
WEIGHT: 212.08 LBS | HEIGHT: 64 IN | TEMPERATURE: 97.6 F | SYSTOLIC BLOOD PRESSURE: 128 MMHG | BODY MASS INDEX: 36.21 KG/M2 | DIASTOLIC BLOOD PRESSURE: 86 MMHG | HEART RATE: 100 BPM

## 2018-11-20 DIAGNOSIS — M47.818 ARTHRITIS OF SACROILIAC JOINT OF BOTH SIDES: ICD-10-CM

## 2018-11-20 DIAGNOSIS — M53.3 SACRAL DYSFUNCTION: ICD-10-CM

## 2018-11-20 DIAGNOSIS — M16.0 PRIMARY OSTEOARTHRITIS OF BOTH HIPS: ICD-10-CM

## 2018-11-20 DIAGNOSIS — E55.9 VITAMIN D DEFICIENCY: ICD-10-CM

## 2018-11-20 DIAGNOSIS — M47.816 LUMBAR SPONDYLOSIS: ICD-10-CM

## 2018-11-20 PROCEDURE — 99213 OFFICE O/P EST LOW 20 MIN: CPT | Performed by: PHYSICAL MEDICINE & REHABILITATION

## 2018-11-20 NOTE — PROGRESS NOTES
Follow-up patient note    Physiatry (physical medicine and  Rehabilitation), interventional spine and sports medicine    Date of Service: 11/20/2018    Chief complaint: Low back and lateral leg pain    HISTORY    HPI: Fadumo Adair 62 y.o. female who presents today for evaluation of pain lateral hips and low back.  Since the last visit, she reports that the PT has helped.  She is taking less pain medication.  At this time, she continues the turmeric.  Not sure yet if this is helping.    Walking distances seems to aggravate in the low back and groin.  This is an achy pain.  Resting for a few minutes helps temporarily.  Occasionally, she gets some symptoms that radiate into the left leg, not past the knee.    She has been working out more, riding her bicycle about 10-12 miles twice a week and going to exercise classes.      Medical records review:  I reviewed the note from the referring provider Ursula Mccord A.     Previous treatments:    Physical Therapy: Yes     Medications the patient is tried: NSAIDs, tramadol and tylenol #3, salonpas patches    Previous interventions or surgeries: none      ROS: Unchanged from previous visit 10/19/2018  CV: HTN  : Bladder cancer    Red Flags ROS:   Fever, Chills, Sweats: Denies  Involuntary Weight Loss: Denies  Bladder Incontinence: Denies  Bowel Incontinence: denies  Saddle Anesthesia: Denies    PMHx:   Past Medical History:   Diagnosis Date   • Anemia    • Arrhythmia     mild, pt not sure problem, does not see cardiologist    • Arthritis     hip, knees, back   • Cancer (HCC)     bladder   • Heart burn    • High cholesterol    • HTN    • Hyperlipemia    • Indigestion    • Low back pain 09-28-11    hips, and knees   • Urinary bladder disorder        PSHx:   Past Surgical History:   Procedure Laterality Date   • TRANS URETHRAL RESECTION BLADDER  5/8/2018    Procedure: TRANS URETHRAL RESECTION BLADDER- TUMOR;  Surgeon: Sigifredo Montes M.D.;  Location: SURGERY  [facility];  Service: Urology   • CYSTOSCOPY  10/11/2011    Performed by MOLINA LOGAN at SURGERY [facility] ORS   • VAGINAL PERINEAL EXAM REPAIR  10/11/2011    Performed by MOLINA LOGAN at SURGERY [facility] ORS   • HYSTERECTOMY ROBOTIC  10/11/2011    Performed by MOLINA LOGAN at SURGERY [facility] ORS   • PRIMARY C SECTION      x  2   • TONSILLECTOMY AND ADENOIDECTOMY      age 7       Family history   Family History   Problem Relation Age of Onset   • Cancer Mother    • Heart Disease Father          Medications:   Ibuprofen 600mg  Current Outpatient Prescriptions   Medication   • Aspirin-Acetaminophen-Caffeine (EXCEDRIN MIGRAINE PO)   • Turmeric 500 MG Cap   • vitamin D (CHOLECALCIFEROL) 1000 UNIT Tab   • coenzyme Q-10 30 MG capsule   • lisinopril (PRINIVIL) 20 MG Tab   • rosuvastatin (CRESTOR) 20 MG Tab   • hydrochlorothiazide (HYDRODIURIL) 25 MG Tab   • NARCAN 4 MG/0.1ML Liquid   • oxybutynin (DITROPAN) 5 MG Tab   • phenazopyridine (PYRIDIUM) 200 MG Tab   • acetaminophen (TYLENOL) 500 MG Tab   • Dihydroxyaluminum Sod Carb (ACID RELIEF PO)     No current facility-administered medications for this visit.        Allergies:   No Known Allergies    Social Hx:   Social History     Social History   • Marital status:      Spouse name: N/A   • Number of children: N/A   • Years of education: N/A     Occupational History   • Not on file.     Social History Main Topics   • Smoking status: Former Smoker     Packs/day: 0.50     Years: 30.00     Types: Cigarettes     Quit date: 2/1/2011   • Smokeless tobacco: Never Used   • Alcohol use 0.5 oz/week     1 Standard drinks or equivalent per week      Comment: 4 a week    • Drug use: No   • Sexual activity: Not on file     Other Topics Concern   •  Service No   • Blood Transfusions No   • Caffeine Concern No   • Occupational Exposure No   • Hobby Hazards No   • Sleep Concern Yes   • Stress Concern No   • Weight Concern Yes   • Special Diet  "No   • Back Care No   • Exercise Yes   • Bike Helmet Yes   • Seat Belt Yes   • Self-Exams No     Social History Narrative   • No narrative on file         EXAMINATION     Physical Exam:   Vitals: Blood pressure 128/86, pulse 100, temperature 36.4 °C (97.6 °F), temperature source Temporal, height 1.626 m (5' 4\"), weight 96.2 kg (212 lb 1.3 oz), last menstrual period 09/10/2011.    Constitutional:   Body Habitus: Body mass index is 36.4 kg/m².  Cooperation: Fully cooperates with exam  Appearance: Well-groomed, well-nourished, not disheveled, in no acute distress    Eyes: No scleral icterus, no proptosis     ENT -no obvious auditory deficits, no obvious tongue lesions, tongue midline, no facial droop     Skin -no rashes or lesions noted     Respiratory-  breathing comfortable on room air, no audible wheezing    Cardiovascular- No pitting edema in lower extremity edema is noted.     Psychiatric- alert and oriented ×3. Normal affect.     Gait - normal gait, no use of ambulatory device, nonantalgic.    Musculoskeletal -   Thoracic/Lumbar Spine/Sacral Spine/Hips   Inspection: No evidence of atrophy in bilateral lower extremities throughout   Palpation:   palpation over SI joint: positive bilaterally    palpation in hip or over the greater trochanters: positive bilaterally      Neuro     Key points for the international standards for neurological classification of spinal cord injury (ISNCSCI) to light touch.     Dermatome R L   L2 2 2   L3 2 2   L4 2 2   L5 2 2   S1 2 2   S2 2 2       Motor Exam Lower Extremities    ? Myotome R L   Hip flexion L2 5 5   Knee extension L3 5 5   Ankle dorsiflexion L4 5 5   Toe extension L5 5 5   Ankle plantarflexion S1 5 5        Clonus of the ankle negative bilaterally     Reflexes  ?  R L   Patella  2+ 2+   Achilles   2+ 2+       MEDICAL DECISION MAKING    Medical records review: see under HPI section.     DATA    Labs: No new labs to review  Lab Results   Component Value Date/Time    SODIUM " 136 09/05/2018 08:16 AM    POTASSIUM 4.1 09/05/2018 08:16 AM    CHLORIDE 98 09/05/2018 08:16 AM    CO2 26 09/05/2018 08:16 AM    ANION 12.0 (H) 09/05/2018 08:16 AM    GLUCOSE 113 (H) 09/05/2018 08:16 AM    BUN 17 09/05/2018 08:16 AM    CREATININE 0.93 09/05/2018 08:16 AM    CREATININE 0.72 05/12/2011 07:40 AM    CALCIUM 9.5 09/05/2018 08:16 AM    ASTSGOT 19 09/05/2018 08:16 AM    ALTSGPT 19 09/05/2018 08:16 AM    TBILIRUBIN 0.6 09/05/2018 08:16 AM    ALBUMIN 4.5 09/05/2018 08:16 AM    TOTPROTEIN 7.3 09/05/2018 08:16 AM    GLOBULIN 2.8 09/05/2018 08:16 AM    AGRATIO 1.6 09/05/2018 08:16 AM        Lab Results   Component Value Date/Time    WBC 5.2 09/05/2018 08:16 AM    WBC 6.0 05/12/2011 07:40 AM    RBC 4.49 09/05/2018 08:16 AM    RBC 4.08 05/12/2011 07:40 AM    HEMOGLOBIN 12.7 09/05/2018 08:16 AM    HEMATOCRIT 39.2 09/05/2018 08:16 AM    MCV 87.3 09/05/2018 08:16 AM    MCV 91 05/12/2011 07:40 AM    MCH 28.3 09/05/2018 08:16 AM    MCH 28.9 05/12/2011 07:40 AM    MCHC 32.4 (L) 09/05/2018 08:16 AM    MPV 11.6 09/05/2018 08:16 AM    NEUTSPOLYS 47.20 09/05/2018 08:16 AM    LYMPHOCYTES 40.60 09/05/2018 08:16 AM    MONOCYTES 9.10 09/05/2018 08:16 AM    EOSINOPHILS 1.90 09/05/2018 08:16 AM    BASOPHILS 0.80 09/05/2018 08:16 AM    HYPOCHROMIA 1+ 05/13/2014 07:20 AM    ANISOCYTOSIS 2+ 09/28/2011 01:16 PM        Lab Results   Component Value Date/Time    HBA1C 6.2 (H) 09/05/2018 08:16 AM        Imaging: I personally reviewed following images, these are my reads  Xrays hips 10/16/2018  There is note of mild hip arthrosis and also mild bilateral sacroiliac arthrosis.  Lumbosacral spondylosis is also noted on films.    MRI lumbar 08/29/2018:  There is note of mild multilevel degenerative disc disease and facet arthropathy.  Mild neural foraminal narrowing is noted L4-5 and L5-S1 bilaterally.  There is no evidence for central or lateral recess stenosis in the lumbar spine.  The facet arthropathy is worst at L4-5  bilaterally.    Xray hips 09/07/2017:  There is note of moderate degenerative changes of the hips bilaterally.  Findings are suggestive of PATRICIA.    IMAGING radiology reads. I reviewed the following radiology reads   Xray hips 10/16/2018  Mild, symmetric bilateral hip osteoarthrosis                                         Results for orders placed during the hospital encounter of 08/28/18   MR-LUMBAR SPINE-W/O    Impression Multilevel degenerative disc disease and facet degeneration. No central canal narrowing. There is mild neural foraminal narrowing extending from L3-4 through L5-S1 bilaterally.                                                                                          Diagnosis   Visit Diagnoses     ICD-10-CM   1. Primary osteoarthritis of both hips M16.0   2. Sacral dysfunction M53.3   3. Arthritis of sacroiliac joint of both sides (HCC) M46.98   4. Vitamin D deficiency E55.9   5. Lumbar spondylosis M47.816       ASSESSMENT:  Fadumo Adair 62 y.o. female with hip osteoarthritis.     Fadumo was seen today for new patient.    Diagnoses and all orders for this visit:    Primary osteoarthritis of both hip  Vitamin D deficiency  Sacral dysfunction  Arthritis of sacroiliac joints bilaterally    Discussed possible sacroiliac joint injections.  Risks, benefits and expectations discussed.  She would like to continue with her home exercises, dietary changes and turmeric for now.  We discussed continuing with her home exercise program for now.    We discussed trial of turmeric 1500mg in divided doses.  Plan to give this a several month trial.    We discussed briefly her combination of medications.  Ideally, I would like to see these be decreased and/or change medications so that she does not have a combination of benzodiazepine with ambien and tramadol.  For now, I did not change medications.    Follow-up: 6-8 weeks    Please note that this dictation was created using voice recognition software. I have  made every reasonable attempt to correct obvious errors but there may be errors of grammar and content that I may have overlooked prior to finalization of this note.      Meet Barrios MD  Physical Medicine and Rehabilitation  Interventional Spine and Sports Physiatry  [facility]        Ursula Mccord A

## 2018-11-28 DIAGNOSIS — F41.1 GAD (GENERALIZED ANXIETY DISORDER): ICD-10-CM

## 2018-11-28 DIAGNOSIS — M25.50 MULTIPLE JOINT PAIN: ICD-10-CM

## 2018-11-28 DIAGNOSIS — M25.9 MULTIPLE JOINT COMPLAINTS: ICD-10-CM

## 2018-11-28 RX ORDER — TRAMADOL HYDROCHLORIDE 50 MG/1
TABLET ORAL
Qty: 90 TAB | Refills: 1 | Status: SHIPPED
Start: 2018-11-28 | End: 2018-12-28

## 2018-11-28 RX ORDER — ALPRAZOLAM 0.5 MG/1
TABLET ORAL
Qty: 30 TAB | Refills: 1 | Status: SHIPPED
Start: 2018-11-28 | End: 2018-12-28

## 2018-11-29 NOTE — TELEPHONE ENCOUNTER
Was the patient seen in the last year in this department? Yes  10/30/18 #30/90    Does patient have an active prescription for medications requested? No     Received Request Via: Pharmacy

## 2018-11-29 NOTE — TELEPHONE ENCOUNTER
Was the patient seen in the last year in this department? Yes  [facility] 10/30/18 #30  Does patient have an active prescription for medications requested? No     Received Request Via: Pharmacy

## 2018-11-30 ENCOUNTER — TELEPHONE (OUTPATIENT)
Dept: MEDICAL GROUP | Facility: LAB | Age: 63
End: 2018-11-30

## 2018-12-17 DIAGNOSIS — F41.1 GAD (GENERALIZED ANXIETY DISORDER): ICD-10-CM

## 2018-12-17 DIAGNOSIS — G47.9 SLEEP DISTURBANCE: ICD-10-CM

## 2018-12-17 RX ORDER — ZOLPIDEM TARTRATE 10 MG/1
TABLET ORAL
Qty: 30 TAB | Refills: 2 | Status: SHIPPED
Start: 2018-12-17 | End: 2019-01-16

## 2018-12-17 NOTE — TELEPHONE ENCOUNTER
Was the patient seen in the last year in this department? Yes [facility] 11/12/18 #30    Does patient have an active prescription for medications requested? No     Received Request Via: Pharmacy

## 2019-01-23 DIAGNOSIS — F41.1 GAD (GENERALIZED ANXIETY DISORDER): ICD-10-CM

## 2019-01-23 DIAGNOSIS — M25.9 MULTIPLE JOINT COMPLAINTS: ICD-10-CM

## 2019-01-23 NOTE — TELEPHONE ENCOUNTER
Was the patient seen in the last year in this department? Yes  1/15/19 #30 Z 12/27/18 30 ALpraz    Does patient have an active prescription for medications requested? No     Received Request Via: Patient

## 2019-01-24 RX ORDER — ALPRAZOLAM 0.5 MG/1
TABLET ORAL
Qty: 30 TAB | Refills: 1 | Status: SHIPPED
Start: 2019-01-24 | End: 2019-02-23

## 2019-01-24 RX ORDER — TRAMADOL HYDROCHLORIDE 50 MG/1
TABLET ORAL
Qty: 90 TAB | Refills: 1 | Status: SHIPPED
Start: 2019-01-24 | End: 2019-03-26 | Stop reason: SDUPTHER

## 2019-02-05 ENCOUNTER — OFFICE VISIT (OUTPATIENT)
Dept: PHYSICAL MEDICINE AND REHAB | Facility: MEDICAL CENTER | Age: 64
End: 2019-02-05
Payer: COMMERCIAL

## 2019-02-05 VITALS
TEMPERATURE: 97.6 F | HEART RATE: 92 BPM | DIASTOLIC BLOOD PRESSURE: 90 MMHG | OXYGEN SATURATION: 96 % | SYSTOLIC BLOOD PRESSURE: 132 MMHG | BODY MASS INDEX: 37.9 KG/M2 | WEIGHT: 222 LBS | HEIGHT: 64 IN

## 2019-02-05 DIAGNOSIS — M16.0 PRIMARY OSTEOARTHRITIS OF BOTH HIPS: ICD-10-CM

## 2019-02-05 DIAGNOSIS — M53.3 SACRAL DYSFUNCTION: ICD-10-CM

## 2019-02-05 DIAGNOSIS — M24.559 FLEXION CONTRACTURE OF HIP, UNSPECIFIED LATERALITY: ICD-10-CM

## 2019-02-05 DIAGNOSIS — M47.818 ARTHRITIS OF SACROILIAC JOINT OF BOTH SIDES: ICD-10-CM

## 2019-02-05 DIAGNOSIS — M47.816 LUMBAR SPONDYLOSIS: ICD-10-CM

## 2019-02-05 PROCEDURE — 99214 OFFICE O/P EST MOD 30 MIN: CPT | Performed by: PHYSICAL MEDICINE & REHABILITATION

## 2019-02-05 RX ORDER — MELOXICAM 15 MG/1
15 TABLET ORAL DAILY
Qty: 15 TAB | Refills: 1 | Status: SHIPPED | OUTPATIENT
Start: 2019-02-05 | End: 2019-03-07

## 2019-02-05 ASSESSMENT — PAIN SCALES - GENERAL: PAINLEVEL: 4=SLIGHT-MODERATE PAIN

## 2019-02-05 ASSESSMENT — PATIENT HEALTH QUESTIONNAIRE - PHQ9
SUM OF ALL RESPONSES TO PHQ QUESTIONS 1-9: 9
CLINICAL INTERPRETATION OF PHQ2 SCORE: 2
5. POOR APPETITE OR OVEREATING: 1 - SEVERAL DAYS

## 2019-02-05 NOTE — PROGRESS NOTES
Follow-up patient note    Physiatry (physical medicine and  Rehabilitation), interventional spine and sports medicine    Date of Service:2/5/2019    Chief complaint: Low back and lateral leg pain    HISTORY    HPI: Fadumo Adair 63 y.o. female who presents today for evaluation of pain lateral hips and low back.  Since the last visit, she reports that the PT has helped.  She has been doing these exercises a few times a week.  The weather has slowed her bicycling, but when she goes, it is 10-12 miles twice a week.    Standing and walking, she has pain over the sacrum and in the groin.  Sometimes she has radiating symptoms towards the knees.  No weakness in her legs.    At this time, she continues the turmeric.  She took this for about three months and did not feel like it helped.  She thinks that this was 750mg twice a day, but she is not sure.  Anyway, she stopped this.    Walking for long seem to aggravate in the low back and groin.  This is an achy pain.  Resting for a few minutes helps temporarily, but she feels more limited.  Occasionally, she gets some symptoms that radiate into the left leg, not past the knee.    The meloxicam is helpful, she is out now.  She took one pill 15mg daily.  We discussed that she should not take other NSAIDs with meloxicam.  Generally, she is taking ultram about three times a day.  Sometimes, she takes a tylenol #3 at bedtime.  She continues to take xanax.    ORT 0  PHQ-9 9    Medical records review    Previous treatments:    Physical Therapy: Yes, maybe helped.    Medications the patient is tried: NSAIDs, tramadol and tylenol #3, salonpas patches    Previous interventions or surgeries: none      ROS: Unchanged from previous visit 11/20/2018  CV: HTN  : Bladder cancer, has plan for follow-up March 2019    Red Flags ROS:   Fever, Chills, Sweats: Denies  Involuntary Weight Loss: Denies  Bladder Incontinence: Denies  Bowel Incontinence: denies  Saddle Anesthesia: Denies    PMHx:    Past Medical History:   Diagnosis Date   • Anemia    • Arrhythmia     mild, pt not sure problem, does not see cardiologist    • Arthritis     hip, knees, back   • Cancer (HCC)     bladder   • Heart burn    • High cholesterol    • HTN    • Hyperlipemia    • Indigestion    • Low back pain 09-28-11    hips, and knees   • Urinary bladder disorder        PSHx:   Past Surgical History:   Procedure Laterality Date   • TRANS URETHRAL RESECTION BLADDER  5/8/2018    Procedure: TRANS URETHRAL RESECTION BLADDER- TUMOR;  Surgeon: Sigifredo Montes M.D.;  Location: SURGERY [facility];  Service: Urology   • CYSTOSCOPY  10/11/2011    Performed by MOLINA LOGAN at SURGERY [facility]   • VAGINAL PERINEAL EXAM REPAIR  10/11/2011    Performed by MOLINA LOGAN at SURGERY [facility]   • HYSTERECTOMY ROBOTIC  10/11/2011    Performed by MOLINA LOGAN at [facility]   • PRIMARY C SECTION      x  2   • TONSILLECTOMY AND ADENOIDECTOMY      age 7       Family history   Family History   Problem Relation Age of Onset   • Cancer Mother    • Heart Disease Father          Medications:   Outpatient Prescriptions Marked as Taking for the 2/5/19 encounter (Office Visit) with Meet Barrios M.D.   Medication Sig Dispense Refill   • meloxicam (MOBIC) 15 MG tablet Take 1 Tab by mouth every day for 30 days. 15 Tab 1   • tramadol (ULTRAM) 50 MG Tab TAKE 1 TABLET BY MOUTH EVERY 6 HOURS AS NEEDED FOR  UP  TO  30  DAYS 90 Tab 1   • ALPRAZolam (XANAX) 0.5 MG Tab TAKE 1 TABLET BY MOUTH AT BEDTIME AS NEEDED FOR SLEEP 30 Tab 1   • vitamin D (CHOLECALCIFEROL) 1000 UNIT Tab Take 1,000 Units by mouth every day.     • coenzyme Q-10 30 MG capsule Take 60 mg by mouth every day.     • lisinopril (PRINIVIL) 20 MG Tab Take 1 Tab by mouth every evening. 90 Tab 3   • rosuvastatin (CRESTOR) 20 MG Tab TAKE ONE TABLET BY MOUTH ONCE DAILY IN THE EVENING 90 Tab 3   • hydrochlorothiazide (HYDRODIURIL) 25 MG Tab TAKE ONE TABLET BY  "MOUTH ONCE DAILY FOR  FLUID  RETENTION 90 Tab 3       Allergies:   No Known Allergies    Social Hx:   Social History     Social History   • Marital status:      Spouse name: N/A   • Number of children: N/A   • Years of education: N/A     Occupational History   • Not on file.     Social History Main Topics   • Smoking status: Former Smoker     Packs/day: 0.50     Years: 30.00     Types: Cigarettes     Quit date: 2/1/2011   • Smokeless tobacco: Never Used   • Alcohol use 0.5 oz/week     1 Standard drinks or equivalent per week      Comment: 4 a week    • Drug use: No   • Sexual activity: Not on file     Other Topics Concern   •  Service No   • Blood Transfusions No   • Caffeine Concern No   • Occupational Exposure No   • Hobby Hazards No   • Sleep Concern Yes   • Stress Concern No   • Weight Concern Yes   • Special Diet No   • Back Care No   • Exercise Yes   • Bike Helmet Yes   • Seat Belt Yes   • Self-Exams No     Social History Narrative   • No narrative on file         EXAMINATION     Physical Exam:   Vitals: Blood pressure 132/90, pulse 92, temperature 36.4 °C (97.6 °F), temperature source Temporal, height 1.626 m (5' 4\"), weight 100.7 kg (222 lb 0.1 oz), last menstrual period 09/10/2011, SpO2 96 %.    Constitutional:   Body Habitus: Body mass index is 38.11 kg/m².  Cooperation: Fully cooperates with exam  Appearance: Well-groomed, well-nourished, not disheveled, in no acute distress    Eyes: No scleral icterus, no proptosis     ENT -no obvious auditory deficits, no obvious tongue lesions, tongue midline, no facial droop     Skin -no rashes or lesions noted     Respiratory-  breathing comfortable on room air, no audible wheezing    Cardiovascular- No pitting edema in lower extremity edema is noted.     Psychiatric- alert and oriented ×3. Normal affect.     Gait - normal gait, no use of ambulatory device, nonantalgic.    Musculoskeletal -   Thoracic/Lumbar Spine/Sacral Spine/Hips   Inspection: No " evidence of atrophy in bilateral lower extremities throughout   Palpation:   palpation over SI joint: positive bilaterally    palpation in hip or over the greater trochanters: positive bilaterally    Mild hip flexion contractures bilaterally    Neuro     Key points for the international standards for neurological classification of spinal cord injury (ISNCSCI) to light touch.     Dermatome R L   L2 2 2   L3 2 2   L4 2 2   L5 2 2   S1 2 2   S2 2 2       Motor Exam Lower Extremities    ? Myotome R L   Hip flexion L2 5 5   Knee extension L3 5 5   Ankle dorsiflexion L4 5 5   Toe extension L5 5 5   Ankle plantarflexion S1 5 5       Clonus of the ankle negative bilaterally     Reflexes  ?  R L   Patella  2+ 2+   Achilles   2+ 2+       MEDICAL DECISION MAKING    Medical records review: see under HPI section.     DATA    Labs: No new labs to review  Lab Results   Component Value Date/Time    SODIUM 136 09/05/2018 08:16 AM    POTASSIUM 4.1 09/05/2018 08:16 AM    CHLORIDE 98 09/05/2018 08:16 AM    CO2 26 09/05/2018 08:16 AM    ANION 12.0 (H) 09/05/2018 08:16 AM    GLUCOSE 113 (H) 09/05/2018 08:16 AM    BUN 17 09/05/2018 08:16 AM    CREATININE 0.93 09/05/2018 08:16 AM    CREATININE 0.72 05/12/2011 07:40 AM    CALCIUM 9.5 09/05/2018 08:16 AM    ASTSGOT 19 09/05/2018 08:16 AM    ALTSGPT 19 09/05/2018 08:16 AM    TBILIRUBIN 0.6 09/05/2018 08:16 AM    ALBUMIN 4.5 09/05/2018 08:16 AM    TOTPROTEIN 7.3 09/05/2018 08:16 AM    GLOBULIN 2.8 09/05/2018 08:16 AM    AGRATIO 1.6 09/05/2018 08:16 AM        Lab Results   Component Value Date/Time    WBC 5.2 09/05/2018 08:16 AM    WBC 6.0 05/12/2011 07:40 AM    RBC 4.49 09/05/2018 08:16 AM    RBC 4.08 05/12/2011 07:40 AM    HEMOGLOBIN 12.7 09/05/2018 08:16 AM    HEMATOCRIT 39.2 09/05/2018 08:16 AM    MCV 87.3 09/05/2018 08:16 AM    MCV 91 05/12/2011 07:40 AM    MCH 28.3 09/05/2018 08:16 AM    MCH 28.9 05/12/2011 07:40 AM    MCHC 32.4 (L) 09/05/2018 08:16 AM    MPV 11.6 09/05/2018 08:16 AM     NEUTSPOLYS 47.20 09/05/2018 08:16 AM    LYMPHOCYTES 40.60 09/05/2018 08:16 AM    MONOCYTES 9.10 09/05/2018 08:16 AM    EOSINOPHILS 1.90 09/05/2018 08:16 AM    BASOPHILS 0.80 09/05/2018 08:16 AM    HYPOCHROMIA 1+ 05/13/2014 07:20 AM    ANISOCYTOSIS 2+ 09/28/2011 01:16 PM        Lab Results   Component Value Date/Time    HBA1C 6.2 (H) 09/05/2018 08:16 AM        Imaging: I personally reviewed following images, these are my reads:    Xrays hips 10/16/2018:  There is note of mild hip arthrosis and also mild bilateral sacroiliac arthrosis.  Lumbosacral spondylosis is also noted on films.    MRI lumbar 08/29/2018:  There is note of mild multilevel degenerative disc disease and facet arthropathy.  Mild neural foraminal narrowing is noted L4-5 and L5-S1 bilaterally.  There is no evidence for central or lateral recess stenosis in the lumbar spine.  The facet arthropathy is worst at L4-5 bilaterally.    Xray hips 09/07/2017:  There is note of moderate degenerative changes of the hips bilaterally.  Findings are suggestive of PATRICIA.    IMAGING radiology reads. I reviewed the following radiology reads   Xray hips 10/16/2018  Mild, symmetric bilateral hip osteoarthrosis                                         Results for orders placed during the hospital encounter of 08/28/18   MR-LUMBAR SPINE-W/O    Impression Multilevel degenerative disc disease and facet degeneration. No central canal narrowing. There is mild neural foraminal narrowing extending from L3-4 through L5-S1 bilaterally.                                                                                          Diagnosis   Visit Diagnoses     ICD-10-CM   1. Primary osteoarthritis of both hips M16.0   2. Sacral dysfunction M53.3   3. Arthritis of sacroiliac joint of both sides (HCC) M46.98   4. Lumbar spondylosis M47.816   5. Flexion contracture of hip, unspecified laterality M24.559       ASSESSMENT:  Fadumo Adair 62 y.o. female with hip osteoarthritis.     Fadumo  "was seen today for new patient.    Diagnoses and all orders for this visit:    Primary osteoarthritis of both hip  Vitamin D deficiency  Sacral dysfunction  Arthritis of sacroiliac joints bilaterally    Discussed possible sacroiliac joint injections and possible hips as pain contributors.  She remains concerned about injections and understands risks, benefits and expectations as well as the fact that injection would also be diagnostic to determine where the pain generation is greatest.  She does not want to pursue injections or surgery at this point.     She is continuing with her home exercise program. Advised that she work on hip flexion stretching.  Information given and instructions given.    We discussed trial of turmeric 1500mg in divided doses.  She has stopped this.    She is interested in trying glucosamine/chrondroitin.  We discussed that she is going to trial taking glucosamine/chondroitin 1500mg/1200mg and that this will take a number of months, likely before she might be able to  whether or not it has been helpful.    She is taking tylenol #3 at night at times. Otherwise, she is taking tramadol about 2-3 times a day.  Suggestive that she take these medications in a scheduled way to avoid \"chasing her pain.\"  She does average taking three/day.  Ideally, she would wean xanax.    Follow-up: 8 weeks     Patient was seen for 25 minutes face to face of which > 50% of appointment time was spent on counseling and coordination of care regarding the above.      Please note that this dictation was created using voice recognition software. I have made every reasonable attempt to correct obvious errors but there may be errors of grammar and content that I may have overlooked prior to finalization of this note.      Meet Barrios MD  Physical Medicine and Rehabilitation  Interventional Spine and Sports Physiatry  [facility]     "

## 2019-02-25 DIAGNOSIS — F41.1 GAD (GENERALIZED ANXIETY DISORDER): ICD-10-CM

## 2019-02-25 DIAGNOSIS — M25.50 MULTIPLE JOINT PAIN: ICD-10-CM

## 2019-02-25 NOTE — TELEPHONE ENCOUNTER
Was the patient seen in the last year in this department? Yes LOV 8/21/18  1/26/19    Does patient have an active prescription for medications requested? No     Received Request Via: Pharmacy

## 2019-03-25 DIAGNOSIS — F41.1 GAD (GENERALIZED ANXIETY DISORDER): ICD-10-CM

## 2019-03-25 RX ORDER — ALPRAZOLAM 0.5 MG/1
TABLET ORAL
Qty: 30 TAB | Refills: 1 | Status: SHIPPED
Start: 2019-03-25 | End: 2019-04-24

## 2019-03-25 NOTE — TELEPHONE ENCOUNTER
Was the patient seen in the last year in this department? Yes  LOV 8/21/18 NEXT APPT 4/4/19  2/25/19  Does patient have an active prescription for medications requested? No     Received Request Via: Pharmacy

## 2019-03-26 DIAGNOSIS — M25.9 MULTIPLE JOINT COMPLAINTS: ICD-10-CM

## 2019-03-26 NOTE — TELEPHONE ENCOUNTER
Was the patient seen in the last year in this department? Yes 8/21/18  2/25/19    Does patient have an active prescription for medications requested? No     Received Request Via: Pharmacy

## 2019-03-27 RX ORDER — TRAMADOL HYDROCHLORIDE 50 MG/1
50 TABLET ORAL EVERY 6 HOURS PRN
Qty: 90 TAB | Refills: 1 | Status: SHIPPED
Start: 2019-03-27 | End: 2019-04-26

## 2019-04-04 ENCOUNTER — OFFICE VISIT (OUTPATIENT)
Dept: PHYSICAL MEDICINE AND REHAB | Facility: MEDICAL CENTER | Age: 64
End: 2019-04-04
Payer: COMMERCIAL

## 2019-04-04 VITALS
OXYGEN SATURATION: 96 % | TEMPERATURE: 97.6 F | WEIGHT: 225.97 LBS | HEART RATE: 94 BPM | HEIGHT: 65 IN | BODY MASS INDEX: 37.65 KG/M2 | SYSTOLIC BLOOD PRESSURE: 128 MMHG | DIASTOLIC BLOOD PRESSURE: 78 MMHG

## 2019-04-04 DIAGNOSIS — M24.559 FLEXION CONTRACTURE OF HIP, UNSPECIFIED LATERALITY: ICD-10-CM

## 2019-04-04 DIAGNOSIS — M70.62 GREATER TROCHANTERIC BURSITIS OF LEFT HIP: ICD-10-CM

## 2019-04-04 DIAGNOSIS — M47.818 ARTHRITIS OF SACROILIAC JOINT OF BOTH SIDES: ICD-10-CM

## 2019-04-04 PROCEDURE — 99214 OFFICE O/P EST MOD 30 MIN: CPT | Performed by: PHYSICAL MEDICINE & REHABILITATION

## 2019-04-04 ASSESSMENT — PATIENT HEALTH QUESTIONNAIRE - PHQ9: CLINICAL INTERPRETATION OF PHQ2 SCORE: 0

## 2019-04-04 ASSESSMENT — PAIN SCALES - GENERAL: PAINLEVEL: 6=MODERATE PAIN

## 2019-04-04 NOTE — PROGRESS NOTES
Follow-up patient note    Physiatry (physical medicine and  Rehabilitation), interventional spine and sports medicine    Date of Service: 04/04/2019    Chief complaint: Low back and lateral leg pain    HISTORY    HPI: Fadumo Adair 63 y.o. female who presents today for evaluation of pain lateral hips and low back.  Since the last visit, she reports that the PT has helped.  She has been doing these exercises a few times a week.  The weather has slowed her bicycling, but when she goes, it is 10-12 miles once a week right now.  This is usually a ride to the golf course and lunch.  This is usually about 90 minutes, including lunch.    She still continues her exercise routine twice a week and has been doing some of the stretches that we discussed at last visit.  She continues to have pain in the low back.  This is achy.  The left lateral leg and thigh is more painful and she has trouble laying on it.  No symptoms radiate below the knee.      She has had pain in her left toe.   It sounds like she has some crepitus in the joint.  No recent injury.  It hurts most at night.  This is a brief stabbing pain.     Since the last visit, she has been taking glucosamine [employer].  It seems like this might be helping.    The meloxicam is helpful, she is out now.  This seems to help the most of anything.  Generally, she is taking ultram about three times a day.  Sometimes, she takes a tylenol #3 at bedtime.  She continues to take xanax.    ORT 0  PHQ-9 9    Medical records review    Previous treatments:    Physical Therapy: Yes, maybe helped.    Medications the patient is tried: NSAIDs, tramadol and tylenol #3, salonpas patches    Previous interventions or surgeries: none      ROS: Unchanged from previous visit 02/05/2019  CV: HTN  : Bladder cancer, has plan for follow-up March 2019    Red Flags ROS:   Fever, Chills, Sweats: Denies  Involuntary Weight Loss: Denies  Bladder Incontinence: Denies  Bowel Incontinence: denies  Saddle  Anesthesia: Denies    PMHx:   Past Medical History:   Diagnosis Date   • Anemia    • Arrhythmia     mild, pt not sure problem, does not see cardiologist    • Arthritis     hip, knees, back   • Cancer (HCC)     bladder   • Heart burn    • High cholesterol    • HTN    • Hyperlipemia    • Indigestion    • Low back pain 09-28-11    hips, and knees   • Urinary bladder disorder        PSHx:   Past Surgical History:   Procedure Laterality Date   • TRANS URETHRAL RESECTION BLADDER  5/8/2018    Procedure: TRANS URETHRAL RESECTION BLADDER- TUMOR;  Surgeon: Sigifredo Montes M.D.;  Location: SURGERY [facility];  Service: Urology   • CYSTOSCOPY  10/11/2011    Performed by MOLINA LOGAN at SURGERY [facility]   • VAGINAL PERINEAL EXAM REPAIR  10/11/2011    Performed by MOLINA LOGAN at SURGERY [facility]   • HYSTERECTOMY ROBOTIC  10/11/2011    Performed by MOLINA LOGAN at SURGERY [facility]   • PRIMARY C SECTION      x  2   • TONSILLECTOMY AND ADENOIDECTOMY      age 7       Family history   Family History   Problem Relation Age of Onset   • Cancer Mother    • Heart Disease Father          Medications:   Outpatient Prescriptions Marked as Taking for the 4/4/19 encounter (Office Visit) with Meet Barrios M.D.   Medication Sig Dispense Refill   • meloxicam (MOBIC) 15 MG tablet Take 1 Tab by mouth every day. 30 Tab 0   • Glucosamine-Chondroit-Vit C-Mn (GLUCOSAMINE CHONDR 1500 COMPLX PO) Take  by mouth.     • tramadol (ULTRAM) 50 MG Tab Take 1 Tab by mouth every 6 hours as needed for up to 30 days. 90 Tab 1   • ALPRAZolam (XANAX) 0.5 MG Tab TAKE 1 TABLET BY MOUTH AT BEDTIME AS NEEDED FOR SLEEP 30 Tab 1   • zolpidem (AMBIEN) 10 MG Tab TAKE 1 TABLET BY MOUTH ONCE DAILY AT BEDTIME AS NEEDED FOR SLEEP FOR  30  DAYS 30 Tab 2   • Aspirin-Acetaminophen-Caffeine (EXCEDRIN MIGRAINE PO) Take  by mouth.     • Turmeric 500 MG Cap Take  by mouth.     • vitamin D (CHOLECALCIFEROL) 1000 UNIT Tab Take 1,000  "Units by mouth every day.     • coenzyme Q-10 30 MG capsule Take 60 mg by mouth every day.     • lisinopril (PRINIVIL) 20 MG Tab Take 1 Tab by mouth every evening. 90 Tab 3   • rosuvastatin (CRESTOR) 20 MG Tab TAKE ONE TABLET BY MOUTH ONCE DAILY IN THE EVENING 90 Tab 3   • hydrochlorothiazide (HYDRODIURIL) 25 MG Tab TAKE ONE TABLET BY MOUTH ONCE DAILY FOR  FLUID  RETENTION 90 Tab 3       Allergies:   No Known Allergies    Social Hx:   Social History     Social History   • Marital status:      Spouse name: N/A   • Number of children: N/A   • Years of education: N/A     Occupational History   • Not on file.     Social History Main Topics   • Smoking status: Former Smoker     Packs/day: 0.50     Years: 30.00     Types: Cigarettes     Quit date: 2/1/2011   • Smokeless tobacco: Never Used   • Alcohol use 0.5 oz/week     1 Standard drinks or equivalent per week      Comment: 4 a week    • Drug use: No   • Sexual activity: Not on file     Other Topics Concern   •  Service No   • Blood Transfusions No   • Caffeine Concern No   • Occupational Exposure No   • Hobby Hazards No   • Sleep Concern Yes   • Stress Concern No   • Weight Concern Yes   • Special Diet No   • Back Care No   • Exercise Yes   • Bike Helmet Yes   • Seat Belt Yes   • Self-Exams No     Social History Narrative   • No narrative on file         EXAMINATION     Physical Exam:   Vitals: /78 (BP Location: Left arm, Patient Position: Sitting, BP Cuff Size: Adult long)   Pulse 94   Temp 36.4 °C (97.6 °F) (Temporal)   Ht 1.651 m (5' 5\")   Wt 102.5 kg (225 lb 15.5 oz)   SpO2 96%     Constitutional:   Body Habitus: Body mass index is 37.6 kg/m².  Cooperation: Fully cooperates with exam  Appearance: Well-groomed, well-nourished, not disheveled, in no acute distress    Eyes: No scleral icterus, no proptosis     ENT -no obvious auditory deficits, no facial droop     Skin -no rashes or lesions noted     Respiratory-  breathing comfortable on room " air, no audible wheezing    Cardiovascular- No pitting edema in lower extremity edema is noted.     Psychiatric- alert and oriented ×3. Normal affect.     Gait - normal gait, no use of ambulatory device, nonantalgic.    Musculoskeletal -   Thoracic/Lumbar Spine/Sacral Spine/Hips   Inspection: No evidence of atrophy in bilateral lower extremities throughout   Palpation:   palpation over SI joint:  nontender bilaterallly    palpation in hip or over the greater trochanters: positive bilaterally.  This is very tender of the left greater trochanter.  Mild hip flexion contractures bilaterally    Neuro     Key points for the international standards for neurological classification of spinal cord injury (ISNCSCI) to light touch.     Dermatome R L   L2 2 2   L3 2 2   L4 2 2   L5 2 2   S1 2 2   S2 2 2       Motor Exam Lower Extremities    ? Myotome R L   Hip flexion L2 5 5   Knee extension L3 5 5   Ankle dorsiflexion L4 5 5   Toe extension L5 5 5   Ankle plantarflexion S1 5 5       Clonus of the ankle negative bilaterally     Reflexes  ?  R L   Patella  2+ 2+   Achilles   2+ 2+       MEDICAL DECISION MAKING    Medical records review: see under HPI section.     DATA    Labs: No new labs to review  Lab Results   Component Value Date/Time    SODIUM 136 09/05/2018 08:16 AM    POTASSIUM 4.1 09/05/2018 08:16 AM    CHLORIDE 98 09/05/2018 08:16 AM    CO2 26 09/05/2018 08:16 AM    ANION 12.0 (H) 09/05/2018 08:16 AM    GLUCOSE 113 (H) 09/05/2018 08:16 AM    BUN 17 09/05/2018 08:16 AM    CREATININE 0.93 09/05/2018 08:16 AM    CREATININE 0.72 05/12/2011 07:40 AM    CALCIUM 9.5 09/05/2018 08:16 AM    ASTSGOT 19 09/05/2018 08:16 AM    ALTSGPT 19 09/05/2018 08:16 AM    TBILIRUBIN 0.6 09/05/2018 08:16 AM    ALBUMIN 4.5 09/05/2018 08:16 AM    TOTPROTEIN 7.3 09/05/2018 08:16 AM    GLOBULIN 2.8 09/05/2018 08:16 AM    AGRATIO 1.6 09/05/2018 08:16 AM        Lab Results   Component Value Date/Time    WBC 5.2 09/05/2018 08:16 AM    WBC 6.0  05/12/2011 07:40 AM    RBC 4.49 09/05/2018 08:16 AM    RBC 4.08 05/12/2011 07:40 AM    HEMOGLOBIN 12.7 09/05/2018 08:16 AM    HEMATOCRIT 39.2 09/05/2018 08:16 AM    MCV 87.3 09/05/2018 08:16 AM    MCV 91 05/12/2011 07:40 AM    MCH 28.3 09/05/2018 08:16 AM    MCH 28.9 05/12/2011 07:40 AM    MCHC 32.4 (L) 09/05/2018 08:16 AM    MPV 11.6 09/05/2018 08:16 AM    NEUTSPOLYS 47.20 09/05/2018 08:16 AM    LYMPHOCYTES 40.60 09/05/2018 08:16 AM    MONOCYTES 9.10 09/05/2018 08:16 AM    EOSINOPHILS 1.90 09/05/2018 08:16 AM    BASOPHILS 0.80 09/05/2018 08:16 AM    HYPOCHROMIA 1+ 05/13/2014 07:20 AM    ANISOCYTOSIS 2+ 09/28/2011 01:16 PM        Lab Results   Component Value Date/Time    HBA1C 6.2 (H) 09/05/2018 08:16 AM        Imaging: I personally reviewed following images, these are my reads:    Xrays hips 10/16/2018:  There is note of mild hip arthrosis and also mild bilateral sacroiliac arthrosis.  Lumbosacral spondylosis is also noted on films.    MRI lumbar 08/29/2018:  There is note of mild multilevel degenerative disc disease and facet arthropathy.  Mild neural foraminal narrowing is noted L4-5 and L5-S1 bilaterally.  There is no evidence for central or lateral recess stenosis in the lumbar spine.  The facet arthropathy is worst at L4-5 bilaterally.    Xray hips 09/07/2017:  There is note of moderate degenerative changes of the hips bilaterally.  Findings are suggestive of PATRICIA.    IMAGING radiology reads. I reviewed the following radiology reads   Xray hips 10/16/2018  Mild, symmetric bilateral hip osteoarthrosis                                         Results for orders placed during the hospital encounter of 08/28/18   MR-LUMBAR SPINE-W/O    Impression Multilevel degenerative disc disease and facet degeneration. No central canal narrowing. There is mild neural foraminal narrowing extending from L3-4 through L5-S1 bilaterally.                                                                                           Diagnosis   Visit Diagnoses     ICD-10-CM   1. 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 Flexion contracture of hip, unspecified laterality M24.559   3. Greater trochanteric bursitis of left hip M70.62       ASSESSMENT:  Fadumo Adair 63 y.o. female with hip osteoarthritis.     Fadumo was seen today for new patient.    Diagnoses and all orders for this visit:    Primary osteoarthritis of both hip, not addressed today  Vitamin D deficiency, not addressed today    Sacroiliac tenderness is less.  Encouraged her to continue with her home stretching program.  Reviewed hip flexion tests again.  Also, discussed use of a foam roller.  Information given and instructions given.    She will continue glucosamine/chrondroitin.  Discussed use of meloxicam.    Discussed left greater trochanteric bursa injection.  Risks, benefits and expectations discussed.  These are primarily infection and bleeding.    Follow-up: for injection    Patient was seen for 25 minutes face to face of which > 50% of appointment time was spent on counseling and coordination of care regarding the above, reviewing exercises and discussing treatment options and exercises       Please note that this dictation was created using voice recognition software. I have made every reasonable attempt to correct obvious errors but there may be errors of grammar and content that I may have overlooked prior to finalization of this note.      Meet Barrios MD  Physical Medicine and Rehabilitation  Interventional Spine and Sports Physiatry  [facility]

## 2019-04-07 RX ORDER — MELOXICAM 15 MG/1
15 TABLET ORAL DAILY
Qty: 30 TAB | Refills: 0 | Status: SHIPPED | OUTPATIENT
Start: 2019-04-07 | End: 2019-07-29 | Stop reason: SDUPTHER

## 2019-05-03 ENCOUNTER — OFFICE VISIT (OUTPATIENT)
Dept: PHYSICAL MEDICINE AND REHAB | Facility: MEDICAL CENTER | Age: 64
End: 2019-05-03
Payer: COMMERCIAL

## 2019-05-03 VITALS
HEIGHT: 64 IN | SYSTOLIC BLOOD PRESSURE: 128 MMHG | HEART RATE: 100 BPM | TEMPERATURE: 97.8 F | WEIGHT: 228.4 LBS | BODY MASS INDEX: 38.99 KG/M2 | OXYGEN SATURATION: 93 % | DIASTOLIC BLOOD PRESSURE: 98 MMHG

## 2019-05-03 DIAGNOSIS — M70.62 GREATER TROCHANTERIC BURSITIS OF LEFT HIP: ICD-10-CM

## 2019-05-03 PROCEDURE — 20611 DRAIN/INJ JOINT/BURSA W/US: CPT | Performed by: PHYSICAL MEDICINE & REHABILITATION

## 2019-05-03 RX ORDER — ZOLPIDEM TARTRATE 10 MG/1
TABLET ORAL
Refills: 2 | COMMUNITY
Start: 2019-04-11 | End: 2019-06-06 | Stop reason: SDUPTHER

## 2019-05-03 RX ORDER — ALPRAZOLAM 0.5 MG/1
TABLET ORAL
Refills: 1 | COMMUNITY
Start: 2019-04-25 | End: 2019-06-03 | Stop reason: SDUPTHER

## 2019-05-03 RX ORDER — TRIAMCINOLONE ACETONIDE 40 MG/ML
40 INJECTION, SUSPENSION INTRA-ARTICULAR; INTRAMUSCULAR ONCE
Status: COMPLETED | OUTPATIENT
Start: 2019-05-03 | End: 2019-05-03

## 2019-05-03 RX ADMIN — TRIAMCINOLONE ACETONIDE 40 MG: 40 INJECTION, SUSPENSION INTRA-ARTICULAR; INTRAMUSCULAR at 16:00

## 2019-05-03 ASSESSMENT — PAIN SCALES - GENERAL: PAINLEVEL: 4=SLIGHT-MODERATE PAIN

## 2019-05-03 ASSESSMENT — PATIENT HEALTH QUESTIONNAIRE - PHQ9: CLINICAL INTERPRETATION OF PHQ2 SCORE: 0

## 2019-05-03 NOTE — PROGRESS NOTES
Date of Service: 5/3/2019    Physician/s: Meet Barrios MD    Pre-operative Diagnosis: left greater trochanteric bursitis    Post-operative Diagnosis: left greater trochanteric bursitis    Procedure: left greater trochanteric bursa injection ultrasound-guided    Description of procedure:    The risks, benefits, and alternatives of the procedure were reviewed and discussed with the patient.  Written informed consent was freely obtained. A pre-procedural time-out was conducted by the physician verifying patient’s identity, procedure to be performed, procedure site and side, and allergy verification. Appropriate equipment was determined to be in place for the procedure.     No sedation was used for this procedure.     In the office suite the patient was placed in a lateral position with her   left side up, and the skin was prepped and draped in the usual sterile fashion. The ultrasound probe was placed over the left greater trochanter and the joint space was located and the target for injection was marked.  A 25g 3.5 inch needle was placed into skin and advanced under ultrasound guidance into the greater trochanteric bursa. Following negative aspiration, approx 5cc of 1% lidocaine with 1cc of 40mg/mL Triamcinolone was then injected into the joint, and the needle was subsequently removed intact after restyleted. The patient's hip was wiped with a 4x4 gauze, the area was cleansed with alcohol prep, and a bandaid was applied. There were no complications noted. The images were saved for permanent storage.     She noted decrease in tenderness over the injection site prior to discharge.    Meet Barrios MD  Physical Medicine and Rehabilitation  Interventional Spine and Sports Physiatry  [facility]

## 2019-05-13 DIAGNOSIS — R60.0 BILATERAL EDEMA OF LOWER EXTREMITY: ICD-10-CM

## 2019-05-13 DIAGNOSIS — I10 ESSENTIAL HYPERTENSION: ICD-10-CM

## 2019-05-13 RX ORDER — HYDROCHLOROTHIAZIDE 25 MG/1
TABLET ORAL
Qty: 90 TAB | Refills: 3 | Status: SHIPPED
Start: 2019-05-13 | End: 2020-01-29

## 2019-05-13 NOTE — TELEPHONE ENCOUNTER
Was the patient seen in the last year in this department? Yes lov 8/21/18    Does patient have an active prescription for medications requested? No     Received Request Via: Pharmacy

## 2019-06-03 DIAGNOSIS — F41.1 GAD (GENERALIZED ANXIETY DISORDER): ICD-10-CM

## 2019-06-03 RX ORDER — ALPRAZOLAM 0.5 MG/1
TABLET ORAL
Qty: 30 TAB | Refills: 2 | Status: SHIPPED
Start: 2019-06-03 | End: 2019-07-03

## 2019-06-03 RX ORDER — ROSUVASTATIN CALCIUM 20 MG/1
TABLET, COATED ORAL
Qty: 90 TAB | Refills: 3 | Status: SHIPPED
Start: 2019-06-03 | End: 2020-01-29

## 2019-06-03 RX ORDER — LISINOPRIL 20 MG/1
TABLET ORAL
Qty: 90 TAB | Refills: 3 | Status: SHIPPED | OUTPATIENT
Start: 2019-06-03 | End: 2020-06-02 | Stop reason: SDUPTHER

## 2019-06-03 NOTE — TELEPHONE ENCOUNTER
Was the patient seen in the last year in this department? Yes  8/21/18 APPT on 6/4/19  Does patient have an active prescription for medications requested? No     Received Request Via: Pharmacy

## 2019-06-04 ENCOUNTER — APPOINTMENT (OUTPATIENT)
Dept: PHYSICAL MEDICINE AND REHAB | Facility: MEDICAL CENTER | Age: 64
End: 2019-06-04
Payer: COMMERCIAL

## 2019-06-06 DIAGNOSIS — F51.01 PRIMARY INSOMNIA: ICD-10-CM

## 2019-06-06 DIAGNOSIS — M15.9 PRIMARY OSTEOARTHRITIS INVOLVING MULTIPLE JOINTS: ICD-10-CM

## 2019-06-06 RX ORDER — ZOLPIDEM TARTRATE 10 MG/1
10 TABLET ORAL NIGHTLY PRN
Qty: 30 TAB | Refills: 2 | Status: SHIPPED
Start: 2019-06-06 | End: 2019-07-06

## 2019-06-06 NOTE — TELEPHONE ENCOUNTER
Was the patient seen in the last year in this department? Yes  8/21/18   Ambien 5/10/19 TYL #3 4/25/19  Does patient have an active prescription for medications requested? No     Received Request Via: Pharmacy

## 2019-06-06 NOTE — TELEPHONE ENCOUNTER
----- Message from Fadumo Adair sent at 6/5/2019  8:04 PM PDT -----  Regarding: Prescription Question  Contact: [phone]  hi  I've been trying to refill RX Tylenol 3 and alprezolam since Monday please help. thanks    Also ambiem needs to be refilled 6/9 please send order t [facility].    I'll make appt to see you also.

## 2019-06-25 ENCOUNTER — OFFICE VISIT (OUTPATIENT)
Dept: MEDICAL GROUP | Facility: LAB | Age: 64
End: 2019-06-25
Payer: COMMERCIAL

## 2019-06-25 VITALS
OXYGEN SATURATION: 98 % | SYSTOLIC BLOOD PRESSURE: 110 MMHG | DIASTOLIC BLOOD PRESSURE: 78 MMHG | BODY MASS INDEX: 37.22 KG/M2 | HEIGHT: 64 IN | HEART RATE: 104 BPM | RESPIRATION RATE: 14 BRPM | WEIGHT: 218 LBS | TEMPERATURE: 97.6 F

## 2019-06-25 DIAGNOSIS — Z12.31 ENCOUNTER FOR SCREENING MAMMOGRAM FOR BREAST CANCER: ICD-10-CM

## 2019-06-25 DIAGNOSIS — Z00.00 ROUTINE GENERAL MEDICAL EXAMINATION AT A HEALTH CARE FACILITY: ICD-10-CM

## 2019-06-25 DIAGNOSIS — Z12.11 SPECIAL SCREENING FOR MALIGNANT NEOPLASM OF COLON: ICD-10-CM

## 2019-06-25 PROCEDURE — 99396 PREV VISIT EST AGE 40-64: CPT | Performed by: NURSE PRACTITIONER

## 2019-06-25 NOTE — PROGRESS NOTES
Chief Complaint   Patient presents with   • Annual Exam       HPI:  Fadumo is a 63 y.o. female who presents for annual exam.  Past medical history of bladder cancer, diagnosed last year  -sees urology regularly- Dr. Bernal every 4 mo. denies any current problems with urination.  Alternates tylenol #3 and tramadol for back and hip pain - also uses CBD oil which helps.  Continue seeing physiatry.  Failed trazodone for sleep -currently takes Ambien prior to bedtime and occasionally a Xanax around 2 or 3 in the morning if she cannot fall back asleep.   Regarding her health maintenance:   Last pap: s/p hysterectomy  Last Mammo:due  Last colonoscopy: due  Bone density test:N\A   Last Lab: due for follow up   Last Td:current   Influenza vaccination:current   Pneumococcal vaccination:not applicable   Hx STD''s: no   Regular exercise: yes      meds:   Current Outpatient Prescriptions   Medication Sig Dispense Refill   • zolpidem (AMBIEN) 10 MG Tab Take 1 Tab by mouth at bedtime as needed for Sleep for up to 30 days. 30 Tab 2   • acetaminophen-codeine #3 (TYLENOL #3) 300-30 MG Tab Take 1 Tab by mouth every 6 hours as needed for up to 30 days. 30 Tab 1   • rosuvastatin (CRESTOR) 20 MG Tab TAKE 1 TABLET BY MOUTH ONCE DAILY IN THE EVENING 90 Tab 3   • lisinopril (PRINIVIL) 20 MG Tab TAKE 1 TABLET BY MOUTH ONCE DAILY IN THE EVENING 90 Tab 3   • ALPRAZolam (XANAX) 0.5 MG Tab TAKE 1 TABLET BY MOUTH AT BEDTIME AS NEEDED FOR SLEEP 30 Tab 2   • hydroCHLOROthiazide (HYDRODIURIL) 25 MG Tab TAKE ONE TABLET BY MOUTH ONCE DAILY FOR FLUID RETENTION 90 Tab 3   • Glucosamine-Chondroit-Vit C-Mn (GLUCOSAMINE CHONDR 1500 COMPLX PO) Take  by mouth.     • Aspirin-Acetaminophen-Caffeine (EXCEDRIN MIGRAINE PO) Take  by mouth.     • oxybutynin (DITROPAN) 5 MG Tab      • vitamin D (CHOLECALCIFEROL) 1000 UNIT Tab Take 1,000 Units by mouth every day.     • coenzyme Q-10 30 MG capsule Take 60 mg by mouth every day.     • acetaminophen (TYLENOL) 500 MG  Tab Take 1,000 mg by mouth 1 time daily as needed.     • Dihydroxyaluminum Sod Carb (ACID RELIEF PO) Take 1 Tab by mouth as needed.     • meloxicam (MOBIC) 15 MG tablet Take 1 Tab by mouth every day. 30 Tab 0   • Turmeric 500 MG Cap Take  by mouth.     • NARCAN 4 MG/0.1ML Liquid      • phenazopyridine (PYRIDIUM) 200 MG Tab Take 200 mg by mouth 3 times a day as needed.       No current facility-administered medications for this visit.        Allergies: No Known Allergies    family:   Family History   Problem Relation Age of Onset   • Cancer Mother    • Heart Disease Father        social hx:   Social History     Social History   • Marital status:      Spouse name: N/A   • Number of children: N/A   • Years of education: N/A     Occupational History   • Not on file.     Social History Main Topics   • Smoking status: Former Smoker     Packs/day: 0.50     Years: 30.00     Types: Cigarettes     Quit date: 2/1/2011   • Smokeless tobacco: Never Used   • Alcohol use 0.5 oz/week     1 Standard drinks or equivalent per week      Comment: 4 a week    • Drug use: No   • Sexual activity: Not on file     Other Topics Concern   •  Service No   • Blood Transfusions No   • Caffeine Concern No   • Occupational Exposure No   • Hobby Hazards No   • Sleep Concern Yes   • Stress Concern No   • Weight Concern Yes   • Special Diet No   • Back Care No   • Exercise Yes   • Bike Helmet Yes   • Seat Belt Yes   • Self-Exams No     Social History Narrative   • No narrative on file       ROS:  No fever, chills, sweats.   No polydipsia, polyuria, temperature intolerance, significant weight changes   No visual changes, blurred vision.  No chest pain, palpitations, peripheral swelling   No chronic cough, shortness of breath, dyspnea with exertion.   No dysphagia, odynophagia, black or bloody stools.   No abdominal pain, nausea, persistent diarrhea, constipation   No dysuria, hematuria, incontinence. Denies nocturia  No rash, pruritis,  "pigment changes.   No focal weakness, syncope, headache, confusion, persistent numbness.   All other systems are reviewed and negative.    PHYSICAL EXAMINATION:  /78 (BP Location: Left arm, Patient Position: Sitting, BP Cuff Size: Large adult)   Pulse (!) 104   Temp 36.4 °C (97.6 °F)   Resp 14   Ht 1.626 m (5' 4\")   Wt 98.9 kg (218 lb)   SpO2 98%   General appearance:healthy, well developed, well nourished  Psych: alert, no distress, cooperative  Eyes: EOM's normal, pupils equal, round, reactive to light  ENT: Ears: external ears normal to inspection and palpation, TM's clear, Nose/Sinuses: nose shows no deformity, asymmetry, or inflammation  Neck: no asymmetry, masses, or scars, no adenopathy, thyroid normal to palpation  Lungs:chest symmetric with normal A/P diameter, no chest deformities noted, normal respiratory rate and rhythm  Cardiovascular:regular rate and rhythm with ectopy every 10-17 beats x one.  Abdomen: umbilicus normal, no masses palpable, no organomegaly  Musculoskeletal:ROM of all joints is normal, no evidence of joint instability  Lymphatic: None significantly enlarged  Skin: no rash, no edema  Neuro: mental status intact, cranial nerves 2-12 intact      ASSESSMENT/PLAN:  1.annual physical exam: HCM:  Pap not due this year.   Encourage monthly self breast exam  Encourage daily exercise for at least 30 minutes  Recommend mammogram and colonoscopy  Recommend 1500 mg Calcium with 600 units vit d daily.    Recommend CBC, CMP, TSH, LP - fasting.  UTD with urology - appt next mo.  Encouraged weight loss / exercise.    Blood pressure is stable.  Sleep is doing well.  Pain is moderately controlled.  "

## 2019-07-29 ENCOUNTER — TELEPHONE (OUTPATIENT)
Dept: MEDICAL GROUP | Facility: LAB | Age: 64
End: 2019-07-29

## 2019-07-29 DIAGNOSIS — M47.818 ARTHRITIS OF SACROILIAC JOINT OF BOTH SIDES: ICD-10-CM

## 2019-07-29 RX ORDER — MELOXICAM 15 MG/1
15 TABLET ORAL DAILY
Qty: 30 TAB | Refills: 0 | Status: SHIPPED | OUTPATIENT
Start: 2019-07-29 | End: 2019-09-03 | Stop reason: SDUPTHER

## 2019-07-29 NOTE — TELEPHONE ENCOUNTER
Pt is going out of town and asking to pick her medication up early. Tramadol  One day early and zolpidem (AMBIEN).  7/5/19 on both 30/30 days   Please advise

## 2019-07-29 NOTE — TELEPHONE ENCOUNTER
Was the patient seen in the last year in this department? Yes  6/25/19  Does patient have an active prescription for medications requested? No     Received Request Via: Patient

## 2019-07-29 NOTE — TELEPHONE ENCOUNTER
----- Message from Fadumo Adair sent at 7/29/2019  3:29 PM PDT -----  Regarding: Prescription Question  Contact: [phone]  please give RX for meloxican. I will use it with a acid reducer. I'm going to [place] and lots of walking there. thanks. leaving on 8.1.19--8.12.19.

## 2019-08-01 ENCOUNTER — TELEPHONE (OUTPATIENT)
Dept: MEDICAL GROUP | Facility: LAB | Age: 64
End: 2019-08-01

## 2019-08-01 NOTE — TELEPHONE ENCOUNTER
MEDICATION PRIOR AUTHORIZATION NEEDED:    1. Name of Medication:     2. Requested By (Name of Pharmacy):      3. Is insurance on file current? yes    4. What is the name & phone number of the 3rd party payor? [phone]

## 2019-09-02 DIAGNOSIS — M15.9 PRIMARY OSTEOARTHRITIS INVOLVING MULTIPLE JOINTS: ICD-10-CM

## 2019-09-03 DIAGNOSIS — M47.818 ARTHRITIS OF SACROILIAC JOINT OF BOTH SIDES: ICD-10-CM

## 2019-09-03 DIAGNOSIS — G47.00 INSOMNIA, UNSPECIFIED TYPE: ICD-10-CM

## 2019-09-03 RX ORDER — TRAMADOL HYDROCHLORIDE 50 MG/1
TABLET ORAL
Qty: 90 TAB | Refills: 1 | Status: SHIPPED
Start: 2019-09-03 | End: 2019-10-03

## 2019-09-03 RX ORDER — ZOLPIDEM TARTRATE 10 MG/1
TABLET ORAL
Qty: 30 TAB | Refills: 2 | Status: SHIPPED
Start: 2019-09-03 | End: 2019-10-03

## 2019-09-03 RX ORDER — MELOXICAM 15 MG/1
TABLET ORAL
Qty: 30 TAB | Refills: 0 | Status: SHIPPED | OUTPATIENT
Start: 2019-09-03 | End: 2020-01-06

## 2019-09-03 NOTE — TELEPHONE ENCOUNTER
Was the patient seen in the last year in this department? Yes  6/25/19  7/31/19  Does patient have an active prescription for medications requested? No     Received Request Via: Pharmacy

## 2019-10-02 DIAGNOSIS — M15.9 PRIMARY OSTEOARTHRITIS INVOLVING MULTIPLE JOINTS: ICD-10-CM

## 2019-10-02 DIAGNOSIS — F41.1 GAD (GENERALIZED ANXIETY DISORDER): ICD-10-CM

## 2019-10-02 RX ORDER — ALPRAZOLAM 0.5 MG/1
TABLET ORAL
Qty: 30 TAB | Refills: 2 | Status: SHIPPED
Start: 2019-10-02 | End: 2019-11-01

## 2019-10-02 NOTE — TELEPHONE ENCOUNTER
Was the patient seen in the last year in this department? Yes  6/25/19  9/3/19  Does patient have an active prescription for medications requested? No     Received Request Via: Pharmacy

## 2019-11-01 DIAGNOSIS — M54.50 CHRONIC LOW BACK PAIN, UNSPECIFIED BACK PAIN LATERALITY, UNSPECIFIED WHETHER SCIATICA PRESENT: ICD-10-CM

## 2019-11-01 DIAGNOSIS — G89.29 CHRONIC LOW BACK PAIN, UNSPECIFIED BACK PAIN LATERALITY, UNSPECIFIED WHETHER SCIATICA PRESENT: ICD-10-CM

## 2019-11-03 DIAGNOSIS — M54.50 CHRONIC LOW BACK PAIN, UNSPECIFIED BACK PAIN LATERALITY, UNSPECIFIED WHETHER SCIATICA PRESENT: ICD-10-CM

## 2019-11-03 DIAGNOSIS — G89.29 CHRONIC LOW BACK PAIN, UNSPECIFIED BACK PAIN LATERALITY, UNSPECIFIED WHETHER SCIATICA PRESENT: ICD-10-CM

## 2019-11-04 RX ORDER — TRAMADOL HYDROCHLORIDE 50 MG/1
TABLET ORAL
Qty: 90 TAB | Refills: 1 | Status: SHIPPED
Start: 2019-11-04 | End: 2019-12-04

## 2019-11-04 NOTE — TELEPHONE ENCOUNTER
Was the patient seen in the last year in this department? Yes  6/25/19  9/16/19  Does patient have an active prescription for medications requested? No     Received Request Via: Pharmacy

## 2019-11-04 NOTE — TELEPHONE ENCOUNTER
Was the patient seen in the last year in this department? Yes  6/25/19  10/2/19  Does patient have an active prescription for medications requested? No     Received Request Via: Pharmacy

## 2019-12-03 DIAGNOSIS — G89.29 CHRONIC LOW BACK PAIN, UNSPECIFIED BACK PAIN LATERALITY, UNSPECIFIED WHETHER SCIATICA PRESENT: ICD-10-CM

## 2019-12-03 DIAGNOSIS — M54.50 CHRONIC LOW BACK PAIN, UNSPECIFIED BACK PAIN LATERALITY, UNSPECIFIED WHETHER SCIATICA PRESENT: ICD-10-CM

## 2019-12-04 RX ORDER — ZOLPIDEM TARTRATE 10 MG/1
TABLET ORAL
Qty: 30 TAB | Refills: 2 | Status: SHIPPED
Start: 2019-12-04 | End: 2020-01-29

## 2019-12-04 NOTE — TELEPHONE ENCOUNTER
Was the patient seen in the last year in this department? Yes  6/25/19  11/7/19 cod#3/11/2/19 zolp  Does patient have an active prescription for medications requested? No     Received Request Via: Pharmacy

## 2020-01-06 DIAGNOSIS — F41.1 GAD (GENERALIZED ANXIETY DISORDER): ICD-10-CM

## 2020-01-06 DIAGNOSIS — M54.50 CHRONIC LOW BACK PAIN, UNSPECIFIED BACK PAIN LATERALITY, UNSPECIFIED WHETHER SCIATICA PRESENT: ICD-10-CM

## 2020-01-06 DIAGNOSIS — G89.29 CHRONIC LOW BACK PAIN, UNSPECIFIED BACK PAIN LATERALITY, UNSPECIFIED WHETHER SCIATICA PRESENT: ICD-10-CM

## 2020-01-06 DIAGNOSIS — M47.818 ARTHRITIS OF SACROILIAC JOINT OF BOTH SIDES: ICD-10-CM

## 2020-01-06 RX ORDER — TRAMADOL HYDROCHLORIDE 50 MG/1
TABLET ORAL
Qty: 90 TAB | Refills: 0 | Status: SHIPPED
Start: 2020-01-06 | End: 2020-02-05

## 2020-01-06 RX ORDER — ALPRAZOLAM 0.5 MG/1
TABLET ORAL
Qty: 30 TAB | Refills: 0 | Status: SHIPPED
Start: 2020-01-06 | End: 2020-02-05

## 2020-01-06 RX ORDER — MELOXICAM 15 MG/1
TABLET ORAL
Qty: 30 TAB | Refills: 0 | Status: SHIPPED | OUTPATIENT
Start: 2020-01-06 | End: 2020-06-02 | Stop reason: SDUPTHER

## 2020-01-06 NOTE — TELEPHONE ENCOUNTER
Was the patient seen in the last year in this department? Yes  LOV 6/25/19   Last Fill #3 12/6/19  ambien 12/5/19  Tramadol 12/5/19  Alprazolam 12/5/19  Does patient have an active prescription for medications requested? No     Received Request Via: Patient

## 2020-01-29 ENCOUNTER — OFFICE VISIT (OUTPATIENT)
Dept: MEDICAL GROUP | Facility: LAB | Age: 65
End: 2020-01-29
Payer: COMMERCIAL

## 2020-01-29 VITALS
TEMPERATURE: 97.5 F | SYSTOLIC BLOOD PRESSURE: 82 MMHG | OXYGEN SATURATION: 96 % | WEIGHT: 213 LBS | BODY MASS INDEX: 36.37 KG/M2 | DIASTOLIC BLOOD PRESSURE: 50 MMHG | HEART RATE: 104 BPM | HEIGHT: 64 IN | RESPIRATION RATE: 16 BRPM

## 2020-01-29 DIAGNOSIS — R19.7 DIARRHEA OF PRESUMED INFECTIOUS ORIGIN: ICD-10-CM

## 2020-01-29 DIAGNOSIS — I10 ESSENTIAL HYPERTENSION: ICD-10-CM

## 2020-01-29 DIAGNOSIS — Z91.89 OTHER SPECIFIED PERSONAL RISK FACTORS, NOT ELSEWHERE CLASSIFIED: ICD-10-CM

## 2020-01-29 DIAGNOSIS — E78.49 OTHER HYPERLIPIDEMIA: ICD-10-CM

## 2020-01-29 DIAGNOSIS — I95.89 HYPOTENSION DUE TO HYPOVOLEMIA: ICD-10-CM

## 2020-01-29 DIAGNOSIS — E86.1 HYPOTENSION DUE TO HYPOVOLEMIA: ICD-10-CM

## 2020-01-29 PROCEDURE — 99214 OFFICE O/P EST MOD 30 MIN: CPT | Performed by: NURSE PRACTITIONER

## 2020-01-29 ASSESSMENT — PATIENT HEALTH QUESTIONNAIRE - PHQ9: CLINICAL INTERPRETATION OF PHQ2 SCORE: 0

## 2020-01-30 NOTE — PROGRESS NOTES
"Chief Complaint   Patient presents with   • Diarrhea     X since the 19th   • Nausea       HPI  Fadumo is a 64-year-old established female here with a couple of things:  1.-Diarrhea: New issue.  Diarrhea started when she returned Home from [place] 1/18 - onset diarrhea / nausea the next day and persisted through yesterday - improving today.  + snot appearance in diarrhea.  Finally had a formed stool this morning.  Occasional blood on toilet paper.  Rectum stings.  Stool is very malodorous.  Denies excessive gas / burping.  No other associated symptoms.  Trying to drink a lot of fluids.  Eating yogert.  immodium did not help.    No one else on cruise got sick.    Appetite improving.  Denies fever / chills / body aches.      #2- hyperlipidemia: Chronic issue.  Suffers from a lot of joint pains.  Tells me that with her diarrhea and stomach discomfort, she decided to stop almost all of her medications.  When she stopped rosuvastatin 2 weeks ago and most of her joint pain has resolved.  Tylenol #3- taking this every other day approximately.    Tramadol daily before bed or exercise.  ambien nightly.    Past medical, surgical, family, and social history is reviewed and updated in Epic chart by me today.   Medications and allergies reviewed and updated in Epic chart by me today.     ROS:   As documented in history of present illness above    Exam:  BP (!) 82/50 (BP Location: Left arm, Patient Position: Sitting, BP Cuff Size: Adult)   Pulse (!) 104   Temp 36.4 °C (97.5 °F)   Resp 16   Ht 1.626 m (5' 4\")   Wt 96.6 kg (213 lb)   SpO2 96%   Constitutional: well appearing, jovial, Alert, no distress, plus 3 vital signs  Skin:  Warm, dry, no rashes invisible areas  Eye: Equal, round and reactive, conjunctiva clear  ENMT: Lips without lesions, good dentition, oropharynx clear    Neck: Trachea midline, no masses, no thyromegaly  Respiratory: Unlabored respiration, lungs clear to auscultation, no wheezes, no " rhonchi  Cardiovascular: Normal rate and rhythm.  Abdomen: Soft, nontender, no masses or hepatosplenomegaly  Psych: Alert, pleasant, well-groomed, normal affect    Assessment / Plan / Medical Decision makin. Other specified personal risk factors, not elsewhere classified  -Recommend a CT cardiac scoring to assess her need for further statin therapy, considering rosuvastatin caused myalgias.  Consider pravastatin if CT cardiac scoring is positive or a trial of atorvastatin, pending response by the patient in terms of myalgias.  - CT-CARDIAC SCORING; Future    2. Essential hypertension history with acute hypovolemic hypotension today.  -Stable.  Feeling fine.  At this point I encouraged her to stop lisinopril and hydrochlorothiazide until her blood pressure is consistently over 120/70.  She is likely very dehydrated.  We discussed rehydration, her risk of syncopal episodes and dizziness.  She will check her blood pressure daily for the next week and email me her blood pressures regularly.  She tells me that she really does not feel dehydrated although she verbalized understanding that she likely is.  Her  is staying with her at all times and patient is not currently driving until she feels better.  - CT-CARDIAC SCORING; Future    3. Other hyperlipidemia  -As in #1  - CT-CARDIAC SCORING; Future    4. Diarrhea of presumed infectious origin  -Resolving.  Discussed likely infectious origin via food/water.  I encouraged her to notify me if the diarrhea is returning, at which point she will need stool cultures.

## 2020-03-11 DIAGNOSIS — R51.9 GENERALIZED HEADACHES: ICD-10-CM

## 2020-03-12 NOTE — TELEPHONE ENCOUNTER
Received request via: Pharmacy   2/13/20  Was the patient seen in the last year in this department? Yes  1/29/20  Does the patient have an active prescription (recently filled or refills available) for medication(s) requested? No

## 2020-04-06 DIAGNOSIS — G47.00 INSOMNIA, UNSPECIFIED TYPE: ICD-10-CM

## 2020-04-06 DIAGNOSIS — R51.9 GENERALIZED HEADACHES: ICD-10-CM

## 2020-04-06 RX ORDER — TRAMADOL HYDROCHLORIDE 50 MG/1
TABLET ORAL
Qty: 60 TAB | Refills: 1 | Status: SHIPPED
Start: 2020-04-06 | End: 2020-05-06

## 2020-04-06 RX ORDER — ZOLPIDEM TARTRATE 10 MG/1
TABLET ORAL
Qty: 30 EACH | Refills: 1 | Status: SHIPPED
Start: 2020-04-06 | End: 2020-06-03 | Stop reason: SDUPTHER

## 2020-04-06 NOTE — TELEPHONE ENCOUNTER
Received request via: Pharmacy   3/19/20  Was the patient seen in the last year in this department? Yes  1/29/20  Does the patient have an active prescription (recently filled or refills available) for medication(s) requested? No

## 2020-06-02 DIAGNOSIS — M47.818 ARTHRITIS OF SACROILIAC JOINT OF BOTH SIDES: ICD-10-CM

## 2020-06-02 RX ORDER — LISINOPRIL 20 MG/1
20 TABLET ORAL DAILY
Qty: 90 TAB | Refills: 3 | Status: SHIPPED | OUTPATIENT
Start: 2020-06-02 | End: 2021-05-26

## 2020-06-02 RX ORDER — MELOXICAM 15 MG/1
15 TABLET ORAL DAILY
Qty: 30 TAB | Refills: 0 | Status: SHIPPED | OUTPATIENT
Start: 2020-06-02 | End: 2020-07-16

## 2020-06-02 NOTE — TELEPHONE ENCOUNTER
From: Fadumo Adair  To: Office of Ursula Mccord, A.P.N.  Sent: 6/2/2020 2:37 PM PDT  Subject: Medication Renewal Request    Fadumo Adair would like a refill of the following medications:   Other - Tylenol 3 and zolbidem can be filled on 6/7/20. Please provide refills also. big thanks    Preferred pharmacy: [facility] [address] [place] [address]      Medication renewals requested in this message routed separately:     lisinopril (PRINIVIL) 20 MG Tab [Ursula Mccord, A.P.N.]     meloxicam (MOBIC) 15 MG tablet [Ursula Mccord, A.P.N.]

## 2020-06-02 NOTE — TELEPHONE ENCOUNTER
Received request via: Pharmacy    Was the patient seen in the last year in this department? Yes  1/29/20  Does the patient have an active prescription (recently filled or refills available) for medication(s) requested? No

## 2020-06-03 DIAGNOSIS — R51.9 GENERALIZED HEADACHES: ICD-10-CM

## 2020-06-03 DIAGNOSIS — G47.00 INSOMNIA, UNSPECIFIED TYPE: ICD-10-CM

## 2020-06-03 RX ORDER — ZOLPIDEM TARTRATE 10 MG/1
TABLET ORAL
Qty: 30 EACH | Refills: 0 | Status: SHIPPED | OUTPATIENT
Start: 2020-06-03 | End: 2020-07-02

## 2020-06-03 NOTE — TELEPHONE ENCOUNTER
Please let Fadumo know that she needs to be seen here every 3 months for her codeine.  I did approve this prescription but moving forward, we need a regular visit every 3 months because of her codeine and Ambien being controlled substances.  Thanks

## 2020-06-03 NOTE — TELEPHONE ENCOUNTER
Received request via: Patient   Last Fill 5/7/20 both  Was the patient seen in the last year in this department? Yes    Does the patient have an active prescription (recently filled or refills available) for medication(s) requested? No

## 2020-06-04 DIAGNOSIS — F41.9 ANXIETY: ICD-10-CM

## 2020-06-05 RX ORDER — ALPRAZOLAM 0.5 MG/1
TABLET ORAL
Qty: 30 TAB | Refills: 0 | Status: SHIPPED | OUTPATIENT
Start: 2020-06-05 | End: 2020-07-05

## 2020-06-05 NOTE — TELEPHONE ENCOUNTER
Received request via: Pharmacy   5/2/20  Was the patient seen in the last year in this department? Yes  1/29/20  Does the patient have an active prescription (recently filled or refills available) for medication(s) requested? No

## 2020-07-14 DIAGNOSIS — R60.0 BILATERAL EDEMA OF LOWER EXTREMITY: ICD-10-CM

## 2020-07-14 DIAGNOSIS — I10 ESSENTIAL HYPERTENSION: ICD-10-CM

## 2020-07-14 RX ORDER — HYDROCHLOROTHIAZIDE 25 MG/1
TABLET ORAL
Qty: 90 TAB | Refills: 3 | Status: SHIPPED | OUTPATIENT
Start: 2020-07-14 | End: 2021-06-29

## 2020-07-14 NOTE — TELEPHONE ENCOUNTER
----- Message from Fadumo Adair sent at 7/14/2020  3:47 PM PDT -----  Regarding: RE: Prescription Question  Contact: [phone]  thanks it was hydrochlorothiazide . I thew out the bottle but Rowan had it for years. See you on the 16th

## 2020-07-16 ENCOUNTER — OFFICE VISIT (OUTPATIENT)
Dept: MEDICAL GROUP | Facility: LAB | Age: 65
End: 2020-07-16
Payer: COMMERCIAL

## 2020-07-16 VITALS
DIASTOLIC BLOOD PRESSURE: 80 MMHG | TEMPERATURE: 98.5 F | SYSTOLIC BLOOD PRESSURE: 104 MMHG | HEIGHT: 64 IN | BODY MASS INDEX: 37.73 KG/M2 | OXYGEN SATURATION: 93 % | WEIGHT: 221 LBS | HEART RATE: 114 BPM

## 2020-07-16 DIAGNOSIS — Z12.31 ENCOUNTER FOR SCREENING MAMMOGRAM FOR BREAST CANCER: ICD-10-CM

## 2020-07-16 DIAGNOSIS — Z00.00 PREVENTATIVE HEALTH CARE: ICD-10-CM

## 2020-07-16 DIAGNOSIS — F41.1 GAD (GENERALIZED ANXIETY DISORDER): ICD-10-CM

## 2020-07-16 DIAGNOSIS — R51.9 GENERALIZED HEADACHES: ICD-10-CM

## 2020-07-16 DIAGNOSIS — M15.9 PRIMARY OSTEOARTHRITIS INVOLVING MULTIPLE JOINTS: ICD-10-CM

## 2020-07-16 DIAGNOSIS — Z79.899 CONTROLLED SUBSTANCE AGREEMENT SIGNED: ICD-10-CM

## 2020-07-16 DIAGNOSIS — G47.00 INSOMNIA, UNSPECIFIED TYPE: ICD-10-CM

## 2020-07-16 DIAGNOSIS — Z91.89 OTHER SPECIFIED PERSONAL RISK FACTORS, NOT ELSEWHERE CLASSIFIED: ICD-10-CM

## 2020-07-16 DIAGNOSIS — Z79.891 CHRONIC USE OF OPIATE DRUG FOR THERAPEUTIC PURPOSE: ICD-10-CM

## 2020-07-16 DIAGNOSIS — E78.49 OTHER HYPERLIPIDEMIA: ICD-10-CM

## 2020-07-16 PROBLEM — M19.90 ARTHRITIS: Status: ACTIVE | Noted: 2020-07-16

## 2020-07-16 PROBLEM — K21.9 GASTROESOPHAGEAL REFLUX DISEASE: Status: ACTIVE | Noted: 2020-07-16

## 2020-07-16 PROCEDURE — 99214 OFFICE O/P EST MOD 30 MIN: CPT | Performed by: NURSE PRACTITIONER

## 2020-07-16 RX ORDER — DULOXETIN HYDROCHLORIDE 30 MG/1
30 CAPSULE, DELAYED RELEASE ORAL DAILY
Qty: 30 CAP | Refills: 5 | Status: SHIPPED | OUTPATIENT
Start: 2020-07-16 | End: 2020-08-31

## 2020-07-16 RX ORDER — CELECOXIB 100 MG/1
100 CAPSULE ORAL 2 TIMES DAILY
Qty: 60 CAP | Refills: 5 | Status: SHIPPED | OUTPATIENT
Start: 2020-07-16 | End: 2021-01-22 | Stop reason: SDUPTHER

## 2020-07-16 RX ORDER — ZOLPIDEM TARTRATE 10 MG/1
10 TABLET ORAL NIGHTLY PRN
Qty: 30 TAB | Refills: 3 | Status: SHIPPED | OUTPATIENT
Start: 2020-07-16 | End: 2020-11-24 | Stop reason: SDUPTHER

## 2020-07-16 RX ORDER — ALPRAZOLAM 0.5 MG/1
TABLET ORAL
COMMUNITY
End: 2020-08-31 | Stop reason: SDUPTHER

## 2020-07-16 ASSESSMENT — FIBROSIS 4 INDEX: FIB4 SCORE: 1.34

## 2020-07-16 NOTE — PROGRESS NOTES
"Chief Complaint   Patient presents with   • Follow-Up     [facility] medications       HPI   Fadumo is a 64-year-old established female here with a couple of issues:  #1- Chronic pain from arthritis in back / hips: Chronic issue.  Here for chronic pain follow-up.  Takes 1-2 tylenol #3 per day in the evening.  meloxicam works but causes GI upset.  Occasionally takes one tramadol before exercise b/c it hurts to ride her bike.  She finds tylenol #3 more helpful than tramadol.  She is not quite sure why she alternates tramadol and Tylenol 3.  She prefers to refrain from orthopedics for injections, x-rays and joint replacements.  She is working on losing weight through exercising.  She has been through a very traumatic last several years with her daughter's health and 1 of her daughters passing away.    #2- insomnia: Chronic issue.  Taking Ambien nightly which typically gives her anywhere from 5 to 7 hours of sleep.  Denies sleepwalking or any negative side effects of Ambien.  Failed up to 150 mg trazodone several years ago.      #3-generalized anxiety disorder: Chronic issue.  Suffers from anxiety at least 5 or 6 days/week.  Is not on a daily maintenance anxiety medication.  Takes about one xanax most days of the week.  Xanax does work well for her but does not last very long.  Denies depression.    Past medical, surgical, family, and social history is reviewed and updated in Epic chart by me today.   Medications and allergies reviewed and updated in Epic chart by me today.     ROS:   As documented in history of present illness above    Exam:  /80 (BP Location: Left arm, Patient Position: Sitting, BP Cuff Size: Large adult)   Pulse (!) 114   Temp 36.9 °C (98.5 °F) (Temporal)   Ht 1.626 m (5' 4\")   Wt 100.2 kg (221 lb)   SpO2 93%   Constitutional: Alert, no distress, plus 3 vital signs  Skin:  Warm, dry, no rashes invisible areas  Eye: Equal, round and reactive, conjunctiva clear  ENMT: Lips without " lesions  Respiratory: Unlabored respiration  Cardiovascular: Normal rate and rhythm  Psych: Alert, pleasant, well-groomed, normal affect    Assessment / Plan / Medical Decision makin. Other specified personal risk factors, not elsewhere classified  -She is very hesitant to restart statin therapy although she has no recollection if she had problems with atorvastatin or not in the past.  I highly encouraged her to have a CT cardiac scoring which she was agreeable to and she will follow-up with me in 6 weeks to go over this.  - CT-CARDIAC SCORING; Future    2. Other hyperlipidemia  -As above.  Recommend updated lipid panel.  - CT-CARDIAC SCORING; Future    3. Generalized headaches  -Currently stable.    4. Primary osteoarthritis involving multiple joints  -Currently a daily issue.  Encouraged her to try Celebrex 100 mg twice daily with food as meloxicam gives her heartburn/GI upset.  We discussed that my hope is for her to need a less intake of Tylenol 3 once she is stable on Celebrex.  Discussed potential GI and renal side effects of Celebrex.  I will see her back here in 6 weeks.  - acetaminophen-codeine #3 (TYLENOL #3) 300-30 MG Tab; Take 1 Tab by mouth 3 times a day as needed for up to 30 days.  Dispense: 90 Tab; Refill: 2  - celecoxib (CELEBREX) 100 MG Cap; Take 1 Cap by mouth 2 times a day. For arthritis. With food.  Do not take meloxicam  Dispense: 60 Cap; Refill: 5    5. Insomnia, unspecified type  -Continues with use of Ambien.  Offered a trial of Seroquel or Belsomra which she declines.  Discussed potential long-term memory deficits that can occur with Ambien.  - zolpidem (AMBIEN) 10 MG Tab; Take 1 Tab by mouth at bedtime as needed for Sleep for up to 30 days. FOR SLEEP.  Dispense: 30 Tab; Refill: 3    6. LAN (generalized anxiety disorder)  -Discussed that daily Xanax is not recommended and that her anxiety is not appropriately treated.  I would like for her to try duloxetine for her daily generalized  anxiety as well as her chronic pain.  Discussed potential side effects as well as length of onset efficacy of duloxetine.  May continue to use Xanax as needed for rescue treatment of anxiety or panic attacks.  - DULoxetine (CYMBALTA) 30 MG Cap DR Particles; Take 1 Cap by mouth every day. In the evening for anxiety / pain  Dispense: 30 Cap; Refill: 5    7. Controlled substance agreement signed  -Overall impression that this patient is benefiting from opioid therapy and that the benefits outweigh the risks of continued use. yes   - Controlled Substance Use Agreement updated today  - Urine drug screen updated today  - Additional lab: CMP to assess liver and renal function - UTD   - Rx refill prescriptions are done for 3 months, No early refills.   - Referral to pain management no    - Pain Management Screen; Future  - Consent for Opiate Prescription    8. Chronic use of opiate drug for therapeutic purpose  - Pain Management Screen; Future  - Consent for Opiate Prescription    9. Encounter for screening mammogram for breast cancer  - MA-SCREENING MAMMO BILAT W/CAD; Future    10. Preventative health care  - Comp Metabolic Panel; Future  - CBC WITH DIFFERENTIAL; Future  - Lipid Profile; Future  - HEMOGLOBIN A1C; Future  - TSH; Future    Side effects of all medications prescribed today were discussed with the patient including how to take the medications and proper dosage. Discussed repercussions of not taking the medications as prescribed. Instructed to call the office should she have any negative side effects or problems with the medications.

## 2020-07-21 ENCOUNTER — TELEPHONE (OUTPATIENT)
Dept: MEDICAL GROUP | Facility: LAB | Age: 65
End: 2020-07-21

## 2020-07-21 NOTE — TELEPHONE ENCOUNTER
----- Message from Fadumo Adair sent at 7/21/2020  7:57 AM PDT -----  Regarding: RE: Prescription Question  Contact: [phone]  im try to obtain a new rx called celebrex which needs a PA. thanks

## 2020-07-21 NOTE — TELEPHONE ENCOUNTER
MEDICATION PRIOR AUTHORIZATION NEEDED:    1. Name of Medication:Celecoxib    2. Requested By (Name of Pharmacy): pt     3. Is insurance on file current? yes    4. What is the name & phone number of the 3rd party payor? [id number]

## 2020-07-22 ENCOUNTER — TELEPHONE (OUTPATIENT)
Dept: MEDICAL GROUP | Facility: LAB | Age: 65
End: 2020-07-22

## 2020-07-22 NOTE — TELEPHONE ENCOUNTER
MEDICATION PRIOR AUTHORIZATION NEEDED:    1. Name of Medication: Trazadone    2. Requested By (Name of Pharmacy):      3. Is insurance on file current? yes    4. What is the name & phone number of the 3rd party payor? [id number]     No PAR required

## 2020-07-24 DIAGNOSIS — Z79.891 CHRONIC USE OF OPIATE DRUG FOR THERAPEUTIC PURPOSE: ICD-10-CM

## 2020-07-24 DIAGNOSIS — Z79.899 CONTROLLED SUBSTANCE AGREEMENT SIGNED: ICD-10-CM

## 2020-08-20 ENCOUNTER — HOSPITAL ENCOUNTER (OUTPATIENT)
Dept: RADIOLOGY | Facility: MEDICAL CENTER | Age: 65
End: 2020-08-20
Attending: NURSE PRACTITIONER
Payer: COMMERCIAL

## 2020-08-20 DIAGNOSIS — Z12.31 ENCOUNTER FOR SCREENING MAMMOGRAM FOR BREAST CANCER: ICD-10-CM

## 2020-08-20 PROCEDURE — 77067 SCR MAMMO BI INCL CAD: CPT

## 2020-08-26 ENCOUNTER — HOSPITAL ENCOUNTER (OUTPATIENT)
Dept: LAB | Facility: MEDICAL CENTER | Age: 65
End: 2020-08-26
Attending: NURSE PRACTITIONER
Payer: COMMERCIAL

## 2020-08-26 DIAGNOSIS — Z00.00 PREVENTATIVE HEALTH CARE: ICD-10-CM

## 2020-08-26 LAB
ALBUMIN SERPL BCP-MCNC: 4.7 G/DL (ref 3.2–4.9)
ALBUMIN/GLOB SERPL: 1.7 G/DL
ALP SERPL-CCNC: 47 U/L (ref 30–99)
ALT SERPL-CCNC: 17 U/L (ref 2–50)
ANION GAP SERPL CALC-SCNC: 14 MMOL/L (ref 7–16)
AST SERPL-CCNC: 14 U/L (ref 12–45)
BASOPHILS # BLD AUTO: 0.8 % (ref 0–1.8)
BASOPHILS # BLD: 0.04 K/UL (ref 0–0.12)
BILIRUB SERPL-MCNC: 0.6 MG/DL (ref 0.1–1.5)
BUN SERPL-MCNC: 26 MG/DL (ref 8–22)
CALCIUM SERPL-MCNC: 9.7 MG/DL (ref 8.5–10.5)
CHLORIDE SERPL-SCNC: 98 MMOL/L (ref 96–112)
CHOLEST SERPL-MCNC: 292 MG/DL (ref 100–199)
CO2 SERPL-SCNC: 26 MMOL/L (ref 20–33)
CREAT SERPL-MCNC: 0.8 MG/DL (ref 0.5–1.4)
EOSINOPHIL # BLD AUTO: 0.14 K/UL (ref 0–0.51)
EOSINOPHIL NFR BLD: 2.8 % (ref 0–6.9)
ERYTHROCYTE [DISTWIDTH] IN BLOOD BY AUTOMATED COUNT: 49 FL (ref 35.9–50)
EST. AVERAGE GLUCOSE BLD GHB EST-MCNC: 131 MG/DL
FASTING STATUS PATIENT QL REPORTED: NORMAL
GLOBULIN SER CALC-MCNC: 2.7 G/DL (ref 1.9–3.5)
GLUCOSE SERPL-MCNC: 120 MG/DL (ref 65–99)
HBA1C MFR BLD: 6.2 % (ref 0–5.6)
HCT VFR BLD AUTO: 38.4 % (ref 37–47)
HDLC SERPL-MCNC: 43 MG/DL
HGB BLD-MCNC: 12.2 G/DL (ref 12–16)
IMM GRANULOCYTES # BLD AUTO: 0.02 K/UL (ref 0–0.11)
IMM GRANULOCYTES NFR BLD AUTO: 0.4 % (ref 0–0.9)
LDLC SERPL CALC-MCNC: 206 MG/DL
LYMPHOCYTES # BLD AUTO: 1.82 K/UL (ref 1–4.8)
LYMPHOCYTES NFR BLD: 36.8 % (ref 22–41)
MCH RBC QN AUTO: 28.6 PG (ref 27–33)
MCHC RBC AUTO-ENTMCNC: 31.8 G/DL (ref 33.6–35)
MCV RBC AUTO: 90.1 FL (ref 81.4–97.8)
MONOCYTES # BLD AUTO: 0.4 K/UL (ref 0–0.85)
MONOCYTES NFR BLD AUTO: 8.1 % (ref 0–13.4)
NEUTROPHILS # BLD AUTO: 2.53 K/UL (ref 2–7.15)
NEUTROPHILS NFR BLD: 51.1 % (ref 44–72)
NRBC # BLD AUTO: 0 K/UL
NRBC BLD-RTO: 0 /100 WBC
PLATELET # BLD AUTO: 203 K/UL (ref 164–446)
PMV BLD AUTO: 11.4 FL (ref 9–12.9)
POTASSIUM SERPL-SCNC: 4.1 MMOL/L (ref 3.6–5.5)
PROT SERPL-MCNC: 7.4 G/DL (ref 6–8.2)
RBC # BLD AUTO: 4.26 M/UL (ref 4.2–5.4)
SODIUM SERPL-SCNC: 138 MMOL/L (ref 135–145)
TRIGL SERPL-MCNC: 217 MG/DL (ref 0–149)
TSH SERPL DL<=0.005 MIU/L-ACNC: 1.63 UIU/ML (ref 0.38–5.33)
WBC # BLD AUTO: 5 K/UL (ref 4.8–10.8)

## 2020-08-26 PROCEDURE — 83036 HEMOGLOBIN GLYCOSYLATED A1C: CPT

## 2020-08-26 PROCEDURE — 80061 LIPID PANEL: CPT

## 2020-08-26 PROCEDURE — 85025 COMPLETE CBC W/AUTO DIFF WBC: CPT

## 2020-08-26 PROCEDURE — 80053 COMPREHEN METABOLIC PANEL: CPT

## 2020-08-26 PROCEDURE — 84443 ASSAY THYROID STIM HORMONE: CPT

## 2020-08-26 PROCEDURE — 36415 COLL VENOUS BLD VENIPUNCTURE: CPT

## 2020-08-28 ENCOUNTER — HOSPITAL ENCOUNTER (OUTPATIENT)
Dept: RADIOLOGY | Facility: MEDICAL CENTER | Age: 65
End: 2020-08-28
Attending: NURSE PRACTITIONER
Payer: COMMERCIAL

## 2020-08-28 DIAGNOSIS — E78.49 OTHER HYPERLIPIDEMIA: ICD-10-CM

## 2020-08-28 DIAGNOSIS — Z91.89 OTHER SPECIFIED PERSONAL RISK FACTORS, NOT ELSEWHERE CLASSIFIED: ICD-10-CM

## 2020-08-28 PROCEDURE — 4410556 CT-CARDIAC SCORING

## 2020-08-31 ENCOUNTER — OFFICE VISIT (OUTPATIENT)
Dept: MEDICAL GROUP | Facility: LAB | Age: 65
End: 2020-08-31
Payer: COMMERCIAL

## 2020-08-31 VITALS
RESPIRATION RATE: 16 BRPM | SYSTOLIC BLOOD PRESSURE: 106 MMHG | TEMPERATURE: 97 F | OXYGEN SATURATION: 95 % | WEIGHT: 217 LBS | HEIGHT: 64 IN | BODY MASS INDEX: 37.05 KG/M2 | HEART RATE: 88 BPM | DIASTOLIC BLOOD PRESSURE: 70 MMHG

## 2020-08-31 DIAGNOSIS — Z87.891 HISTORY OF SMOKING: ICD-10-CM

## 2020-08-31 DIAGNOSIS — Z00.00 WELL ADULT EXAM: ICD-10-CM

## 2020-08-31 DIAGNOSIS — Z23 NEED FOR SHINGLES VACCINE: ICD-10-CM

## 2020-08-31 DIAGNOSIS — E66.9 OBESITY (BMI 30-39.9): ICD-10-CM

## 2020-08-31 DIAGNOSIS — R91.1 LUNG NODULE: ICD-10-CM

## 2020-08-31 DIAGNOSIS — F41.9 ANXIETY: ICD-10-CM

## 2020-08-31 DIAGNOSIS — E78.49 OTHER HYPERLIPIDEMIA: ICD-10-CM

## 2020-08-31 PROCEDURE — 90750 HZV VACC RECOMBINANT IM: CPT | Performed by: NURSE PRACTITIONER

## 2020-08-31 PROCEDURE — 99396 PREV VISIT EST AGE 40-64: CPT | Mod: 25 | Performed by: NURSE PRACTITIONER

## 2020-08-31 PROCEDURE — 90471 IMMUNIZATION ADMIN: CPT | Performed by: NURSE PRACTITIONER

## 2020-08-31 RX ORDER — ATORVASTATIN CALCIUM 40 MG/1
40 TABLET, FILM COATED ORAL DAILY
Qty: 90 TAB | Refills: 1 | Status: SHIPPED | OUTPATIENT
Start: 2020-08-31 | End: 2021-02-22

## 2020-08-31 RX ORDER — ALPRAZOLAM 0.5 MG/1
TABLET ORAL
Qty: 30 TAB | Refills: 1 | Status: SHIPPED | OUTPATIENT
Start: 2020-08-31 | End: 2020-11-24

## 2020-08-31 ASSESSMENT — FIBROSIS 4 INDEX: FIB4 SCORE: 1.04

## 2020-08-31 NOTE — PROGRESS NOTES
CC   annual    HPI:  Fadumo is a 64 y.o.  female who presents for annual exam. Generally the patient is feeling good.  Continues to suffer from chronic anxiety and is requesting to have a prescription of Xanax again.  She has taken Xanax for years a couple of days a week as needed for anxiety.  Most of her anxiety right now stems from losing her daughter over a year ago.  She denies acute depression.  She tried duloxetine in hopes that it would also help with her chronic pain but felt extremely foggy and tired, quitting it after just 3 to 4 days.  She also takes Ambien for sleep and codeine for pain.  She drinks rarely.  Regarding her health maintenance:   Hx of hysterectomy.  Denies pelvic pain.  Doing self breast exam.  Denies breast pain.  Last Mammo: UTD  Last colonoscopy:UTD.  Denies GI issues.  Bone density test:N\A   Last Lab: UTD, lipids are extremely elevated but she has been off of statin therapy now for several months because of muscle aches with rosuvastatin, stating she is not quite sure if it was rosuvastatin or not as she still has frequent muscle aches and pains.  Took atorvastatin about 8 to 9 years ago and thinks that she did okay with this.  Last Td:current   Influenza vaccination: due  Pneumococcal vaccination:not applicable   shingrix due  Hx STD''s: no   Regular exercise: yes      meds:   Current Outpatient Medications   Medication Sig Dispense Refill   • atorvastatin (LIPITOR) 40 MG Tab Take 1 Tab by mouth every day. 90 Tab 1   • ALPRAZolam (XANAX) 0.5 MG Tab alprazolam 0.5 mg tablet     • celecoxib (CELEBREX) 100 MG Cap Take 1 Cap by mouth 2 times a day. For arthritis. With food.  Do not take meloxicam 60 Cap 5   • hydroCHLOROthiazide (HYDRODIURIL) 25 MG Tab TAKE ONE TABLET BY MOUTH ONCE DAILY FOR FLUID RETENTION 90 Tab 3   • lisinopril (PRINIVIL) 20 MG Tab Take 1 Tab by mouth every day. 90 Tab 3   • vitamin D (CHOLECALCIFEROL) 1000 UNIT Tab Take 1,000 Units by mouth every day.     • coenzyme  Q-10 30 MG capsule Take 60 mg by mouth every day.     • NARCAN 4 MG/0.1ML Liquid        No current facility-administered medications for this visit.        Allergies: No Known Allergies    family:   Family History   Problem Relation Age of Onset   • Cancer Mother    • Heart Disease Father        social hx:   Social History     Socioeconomic History   • Marital status:      Spouse name: Not on file   • Number of children: Not on file   • Years of education: Not on file   • Highest education level: Not on file   Occupational History   • Not on file   Social Needs   • Financial resource strain: Not on file   • Food insecurity     Worry: Not on file     Inability: Not on file   • Transportation needs     Medical: Not on file     Non-medical: Not on file   Tobacco Use   • Smoking status: Former Smoker     Packs/day: 0.50     Years: 30.00     Pack years: 15.00     Types: Cigarettes     Quit date: 2011     Years since quittin.5   • Smokeless tobacco: Never Used   Substance and Sexual Activity   • Alcohol use: Yes     Alcohol/week: 0.5 oz     Types: 1 Standard drinks or equivalent per week     Comment: 4 a week    • Drug use: No   • Sexual activity: Not on file   Lifestyle   • Physical activity     Days per week: Not on file     Minutes per session: Not on file   • Stress: Not on file   Relationships   • Social connections     Talks on phone: Not on file     Gets together: Not on file     Attends Voodoo service: Not on file     Active member of club or organization: Not on file     Attends meetings of clubs or organizations: Not on file     Relationship status: Not on file   • Intimate partner violence     Fear of current or ex partner: Not on file     Emotionally abused: Not on file     Physically abused: Not on file     Forced sexual activity: Not on file   Other Topics Concern   •  Service No   • Blood Transfusions No   • Caffeine Concern No   • Occupational Exposure No   • Hobby Hazards No   •  "Sleep Concern Yes   • Stress Concern No   • Weight Concern Yes   • Special Diet No   • Back Care No   • Exercise Yes   • Bike Helmet Yes   • Seat Belt Yes   • Self-Exams No   Social History Narrative   • Not on file       ROS:  No fever, chills, sweats.   No polydipsia, polyuria, temperature intolerance, significant weight changes   No visual changes, blurred vision.  No chest pain, palpitations, peripheral swelling   No chronic cough, shortness of breath, dyspnea with exertion.   No dysphagia, odynophagia, black or bloody stools.   No abdominal pain, nausea, persistent diarrhea, constipation   No dysuria, hematuria, incontinence. Denies nocturia  No rash, pruritis, pigment changes.   No focal weakness, syncope, headache, confusion, persistent numbness.   All other systems are reviewed and negative.    PHYSICAL EXAMINATION:  /70   Pulse 88   Temp 36.1 °C (97 °F)   Resp 16   Ht 1.626 m (5' 4\")   Wt 98.4 kg (217 lb)   SpO2 95%   General appearance:healthy, well developed, well nourished  Psych: alert, no distress, cooperative  Eyes: EOM's normal, pupils equal, round, reactive to light  ENT: Ears: external ears normal to inspection and palpation, TM's clear, Nose/Sinuses: nose shows no deformity, asymmetry, or inflammation  Neck: no asymmetry, masses, or scars, no adenopathy, thyroid normal to palpation  Lungs:chest symmetric with normal A/P diameter, no chest deformities noted, normal respiratory rate and rhythm  Cardiovascular:regular rate and rhythm, S1 normal  Abdomen: umbilicus normal, no masses palpable, no organomegaly  Musculoskeletal:ROM of all joints is normal, no evidence of joint instability  Lymphatic: None significantly enlarged  Skin: no rash, no edema  Neuro: mental status intact, cranial nerves 2-12 intact      ASSESSMENT/PLAN:  1.annual physical exam:  Reviewed CT cardiac scoring.  Retrial of atorvastatin at 40 mg, increasing to 80 if tolerated after 1 month.  Discussed the importance of " statin therapy.  Also discussed lung nodule seen on CT cardiac scoring and recommend recheck in 6 months.  Mammogram, colonoscopy and labs are up-to-date.  Shingrix No. 1 given today.  The patient was counseled regarding all immunizations, what the patient is being immunized against, possible side effects, and the importance of immunization.   She was given a prescription of alprazolam.  She understands that she should not take alprazolam within 4 hours of codeine or Ambien.  She understands that she should not take Xanax within 6 hours of driving a car, operating machinery or drinking alcohol.  She understands the chemically dependent nature of Xanax.  Follow-up 3 months.

## 2020-10-28 DIAGNOSIS — M15.9 PRIMARY OSTEOARTHRITIS INVOLVING MULTIPLE JOINTS: ICD-10-CM

## 2020-11-24 DIAGNOSIS — G47.00 INSOMNIA, UNSPECIFIED TYPE: ICD-10-CM

## 2020-11-24 DIAGNOSIS — M15.9 PRIMARY OSTEOARTHRITIS INVOLVING MULTIPLE JOINTS: ICD-10-CM

## 2020-11-24 DIAGNOSIS — F41.9 ANXIETY: ICD-10-CM

## 2020-11-24 RX ORDER — ZOLPIDEM TARTRATE 10 MG/1
10 TABLET ORAL NIGHTLY PRN
Qty: 30 TAB | Refills: 3 | Status: SHIPPED | OUTPATIENT
Start: 2020-11-24 | End: 2021-03-23

## 2020-11-24 RX ORDER — ALPRAZOLAM 0.5 MG/1
TABLET ORAL
Qty: 30 TAB | Refills: 1 | Status: SHIPPED | OUTPATIENT
Start: 2020-11-24 | End: 2021-02-22

## 2020-11-24 NOTE — TELEPHONE ENCOUNTER
----- Message from Fadumo Adair sent at 11/24/2020  8:33 AM PST -----  Regarding: Prescription Question  Contact: [phone]  HI  my  and I survived COVID19. Should I take a test to make sure im negative now ??? My initial onset was 10/27. how about antibody test??    should i make an appt to keep rx more current for refills?    Please refill the following Rx  tylenol 3  alprazol  zofidem    thanks and happy Thanksgiving

## 2020-12-22 DIAGNOSIS — M15.9 PRIMARY OSTEOARTHRITIS INVOLVING MULTIPLE JOINTS: ICD-10-CM

## 2021-01-22 DIAGNOSIS — M15.9 PRIMARY OSTEOARTHRITIS INVOLVING MULTIPLE JOINTS: ICD-10-CM

## 2021-01-22 RX ORDER — CELECOXIB 100 MG/1
CAPSULE ORAL
Qty: 60 CAP | Refills: 0 | Status: SHIPPED | OUTPATIENT
Start: 2021-01-22 | End: 2021-02-22

## 2021-02-22 DIAGNOSIS — E78.49 OTHER HYPERLIPIDEMIA: ICD-10-CM

## 2021-02-22 DIAGNOSIS — M15.9 PRIMARY OSTEOARTHRITIS INVOLVING MULTIPLE JOINTS: ICD-10-CM

## 2021-02-22 DIAGNOSIS — F41.9 ANXIETY: ICD-10-CM

## 2021-02-22 RX ORDER — ALPRAZOLAM 0.5 MG/1
TABLET ORAL
Qty: 15 TABLET | Refills: 0 | Status: SHIPPED | OUTPATIENT
Start: 2021-02-22 | End: 2021-03-04 | Stop reason: SDUPTHER

## 2021-02-22 RX ORDER — CELECOXIB 100 MG/1
CAPSULE ORAL
Qty: 60 CAPSULE | Refills: 0 | Status: SHIPPED | OUTPATIENT
Start: 2021-02-22 | End: 2021-03-04 | Stop reason: SDUPTHER

## 2021-02-22 RX ORDER — ATORVASTATIN CALCIUM 40 MG/1
TABLET, FILM COATED ORAL
Qty: 90 TABLET | Refills: 0 | Status: SHIPPED | OUTPATIENT
Start: 2021-02-22 | End: 2021-05-26

## 2021-02-22 NOTE — TELEPHONE ENCOUNTER
Received request via: Pharmacy    Was the patient seen in the last year in this department? Yes  8/31/2020  Does the patient have an active prescription (recently filled or refills available) for medication(s) requested? No

## 2021-03-04 ENCOUNTER — TELEPHONE (OUTPATIENT)
Dept: MEDICAL GROUP | Facility: LAB | Age: 66
End: 2021-03-04

## 2021-03-04 ENCOUNTER — OFFICE VISIT (OUTPATIENT)
Dept: MEDICAL GROUP | Facility: LAB | Age: 66
End: 2021-03-04
Payer: COMMERCIAL

## 2021-03-04 VITALS
WEIGHT: 214 LBS | HEART RATE: 82 BPM | TEMPERATURE: 97.5 F | SYSTOLIC BLOOD PRESSURE: 122 MMHG | HEIGHT: 64 IN | BODY MASS INDEX: 36.54 KG/M2 | DIASTOLIC BLOOD PRESSURE: 70 MMHG | RESPIRATION RATE: 16 BRPM | OXYGEN SATURATION: 95 %

## 2021-03-04 DIAGNOSIS — M25.551 BILATERAL HIP PAIN: ICD-10-CM

## 2021-03-04 DIAGNOSIS — E78.49 OTHER HYPERLIPIDEMIA: ICD-10-CM

## 2021-03-04 DIAGNOSIS — M15.9 PRIMARY OSTEOARTHRITIS INVOLVING MULTIPLE JOINTS: ICD-10-CM

## 2021-03-04 DIAGNOSIS — M54.41 CHRONIC BILATERAL LOW BACK PAIN WITH BILATERAL SCIATICA: ICD-10-CM

## 2021-03-04 DIAGNOSIS — Z79.891 CHRONIC USE OF OPIATE DRUG FOR THERAPEUTIC PURPOSE: ICD-10-CM

## 2021-03-04 DIAGNOSIS — R73.09 ELEVATED HEMOGLOBIN A1C: ICD-10-CM

## 2021-03-04 DIAGNOSIS — M19.90 ARTHRITIS: ICD-10-CM

## 2021-03-04 DIAGNOSIS — G89.29 CHRONIC BILATERAL LOW BACK PAIN WITH BILATERAL SCIATICA: ICD-10-CM

## 2021-03-04 DIAGNOSIS — M54.42 CHRONIC BILATERAL LOW BACK PAIN WITH BILATERAL SCIATICA: ICD-10-CM

## 2021-03-04 DIAGNOSIS — M25.552 BILATERAL HIP PAIN: ICD-10-CM

## 2021-03-04 DIAGNOSIS — I10 ESSENTIAL HYPERTENSION: ICD-10-CM

## 2021-03-04 DIAGNOSIS — F41.9 ANXIETY: ICD-10-CM

## 2021-03-04 PROCEDURE — 99214 OFFICE O/P EST MOD 30 MIN: CPT | Performed by: NURSE PRACTITIONER

## 2021-03-04 RX ORDER — CELECOXIB 200 MG/1
200 CAPSULE ORAL 2 TIMES DAILY
Qty: 180 CAPSULE | Refills: 3 | Status: SHIPPED | OUTPATIENT
Start: 2021-03-04 | End: 2022-06-08

## 2021-03-04 RX ORDER — ALPRAZOLAM 0.5 MG/1
TABLET ORAL
Qty: 30 TABLET | Refills: 2 | Status: SHIPPED | OUTPATIENT
Start: 2021-03-04 | End: 2021-07-12 | Stop reason: SDUPTHER

## 2021-03-04 ASSESSMENT — PATIENT HEALTH QUESTIONNAIRE - PHQ9: CLINICAL INTERPRETATION OF PHQ2 SCORE: 0

## 2021-03-04 ASSESSMENT — FIBROSIS 4 INDEX: FIB4 SCORE: 1.05

## 2021-03-04 NOTE — TELEPHONE ENCOUNTER
DOCUMENTATION OF PAR STATUS:    1. Name of Medication & Dose: celecoxib (CELEBREX) 200 MG Cap     2. Name of Prescription Coverage Company & phone #: [employer] /[id number]    3. Date Prior Auth Submitted: 3/4/21    4. What information was given to obtain insurance decision? OV NOTES FAXED    5. Prior Auth Status? approved    6. Patient Notified: yes

## 2021-03-04 NOTE — PROGRESS NOTES
"Chief Complaint   Patient presents with   • Medication Management       HPI   Fadumo is a 66 yo est female here to f/u on medications / anxiety / arthritis and sleep.     #1-arthritis:  Chronic issue.  Bothers her in hands / back / hips / knees / feet.  Pain is a stiffness in the morning, better with movement and then regresses at night with aching.  Sitting is hard on her - makes back pain worse.  Rides bike which helps.    Meds: celebrex bid - 200 mg is more helpful than 100 mg.  Sometimes she is not quite sure if it helps at all and discontinues it but then restarts when she realizes it is helpful for her chronic aches, pains and stiffness.  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  Tries to keep to 3 per day.    Interventions: She has seen physiatry and had an injection in her hip a few years ago which helped the hip but she felt it made her back worse.  She is interested in updating x-rays and potentially returning for interventions.  Last x-ray of hips 2018 - mild arthritis.        #2-anxiety  Chronic issue.  Takes xanax as needed for anxiety or panic attack - typically 2-3 am if brain is busy / anxious.  Can go a few days without needed xanax.  She has tried a few SSRIs without improvement and recently tried duloxetine which she states made her feel very loopy and groggy.  She has been through the death of her daughter several years ago and ever since has suffered from increased anxiety.    #3-insomnia:    Sleeping well with ambien.  Denies any negative side effects of Ambien such as sleepwalking or talking.  Denies memory struggles.    Past medical, surgical, family, and social history is reviewed and updated in Epic chart by me today.   Medications and allergies reviewed and updated in Epic chart by me today.     ROS:   As documented in history of present illness above    Exam:  /70 (BP Location: Right arm, Patient Position: Sitting, BP Cuff " "Size: Large adult)   Pulse 82   Temp 36.4 °C (97.5 °F)   Resp 16   Ht 1.626 m (5' 4\")   Wt 97.1 kg (214 lb)   SpO2 95%   Constitutional: Alert, no distress, plus 3 vital signs  Skin:  Warm, dry, no rashes invisible areas  Eye: Equal, round and reactive, conjunctiva clear  ENMT: Lips without lesions  Respiratory: Unlabored respiration  Cardiovascular: Normal rate and rhythm  Psych: Alert, pleasant, well-groomed, normal affect    Assessment / Plan / Medical Decision makin. Arthritis  DX-KNEE COMPLETE 4+ LEFT   2. Bilateral hip pain  DX-HIP-BILATERAL-WITH PELVIS-3/4 VIEWS   3. Chronic bilateral low back pain with bilateral sciatica  DX-LUMBAR SPINE-BEND OR FLEX-EXT   4. Elevated hemoglobin A1c  CBC WITH DIFFERENTIAL    HEMOGLOBIN A1C   5. Essential hypertension  CBC WITH DIFFERENTIAL    Comp Metabolic Panel   6. Other hyperlipidemia  CBC WITH DIFFERENTIAL    Comp Metabolic Panel    Lipid Profile   7. Primary osteoarthritis involving multiple joints  acetaminophen-codeine #3 (TYLENOL #3) 300-30 MG Tab    celecoxib (CELEBREX) 200 MG Cap   8. Anxiety  ALPRAZolam (XANAX) 0.5 MG Tab   9. Chronic use of opiate drug for therapeutic purpose       Recommend updated x-rays of her most bothersome areas including knees, hips and spine.  Based on x-rays, she would like to consider either returning to physiatry or orthopedics.  I did encourage her to continue to make efforts at weight loss and to exercise daily.  She will continue on Celebrex and do a trial of 200 mg once daily, taking a few days off per month for renal and GI protection.  Discussed eating with Celebrex and drinking plenty of water.  Recommend repeat CMP or BMP every 6 months.    Also recommend updated lipid panel now that she is back on atorvastatin.  Recommend updated A1c with her history of prediabetes.  Blood pressure is well controlled.    Refilled alprazolam and Tylenol with codeine today.  She understands that she should not take Xanax within 6 " hours of driving a car, operating machinery or drinking alcohol.  She understands the chemically dependent nature of Xanax.  In prescribing controlled substances to this patient, I certify that I have obtained and reviewed the medical history of Fadumo Adair. I have also made a good janet effort to obtain applicable records from other providers who have treated the patient and records did not demonstrate any increased risk of substance abuse that would prevent me from prescribing controlled substances.     I have conducted a physical exam and documented it. I have reviewed Ms. Adair’s prescription history as maintained by the [place] Prescription Monitoring Program.     I have assessed the patient’s risk for abuse, dependency, and addiction using the validated Opioid Risk Tool available at https://www.mdcalc.com/lupqip-xxqf-hicg-ort-narcotic-abuse.     Given the above, I believe the benefits of controlled substance therapy outweigh the risks. The reasons for prescribing controlled substances include non-narcotic, oral analgesic alternatives have been inadequate for pain control. Accordingly, I have discussed the risk and benefits, treatment plan, and alternative therapies with the patient.     Urine drug screen and controlled substance agreement are up-to-date.

## 2021-03-15 DIAGNOSIS — Z23 NEED FOR VACCINATION: ICD-10-CM

## 2021-03-19 ENCOUNTER — IMMUNIZATION (OUTPATIENT)
Dept: FAMILY PLANNING/WOMEN'S HEALTH CLINIC | Facility: IMMUNIZATION CENTER | Age: 66
End: 2021-03-19
Attending: INTERNAL MEDICINE
Payer: COMMERCIAL

## 2021-03-19 DIAGNOSIS — Z23 ENCOUNTER FOR VACCINATION: Primary | ICD-10-CM

## 2021-03-19 DIAGNOSIS — Z23 NEED FOR VACCINATION: ICD-10-CM

## 2021-03-19 PROCEDURE — 0011A MODERNA SARS-COV-2 VACCINE: CPT

## 2021-03-19 PROCEDURE — 91301 MODERNA SARS-COV-2 VACCINE: CPT

## 2021-05-01 ENCOUNTER — PATIENT OUTREACH (OUTPATIENT)
Dept: SCHEDULING | Facility: IMAGING CENTER | Age: 66
End: 2021-05-01

## 2021-05-01 NOTE — PROGRESS NOTES
Attempt #1    Outreach for COVID vaccine    Outreach Outcome received D2 elsewhere    Transfer to [phone] if patient calls back to schedule appointment.

## 2021-05-26 DIAGNOSIS — E78.49 OTHER HYPERLIPIDEMIA: ICD-10-CM

## 2021-05-26 RX ORDER — ATORVASTATIN CALCIUM 40 MG/1
TABLET, FILM COATED ORAL
Qty: 90 TABLET | Refills: 0 | Status: SHIPPED | OUTPATIENT
Start: 2021-05-26 | End: 2021-08-16 | Stop reason: SDUPTHER

## 2021-05-26 RX ORDER — LISINOPRIL 20 MG/1
TABLET ORAL
Qty: 90 TABLET | Refills: 0 | Status: SHIPPED | OUTPATIENT
Start: 2021-05-26 | End: 2021-08-16 | Stop reason: SDUPTHER

## 2021-05-26 NOTE — TELEPHONE ENCOUNTER
Received request via: Pharmacy    Was the patient seen in the last year in this department? Yes  3/4/2021  Does the patient have an active prescription (recently filled or refills available) for medication(s) requested? No

## 2021-06-17 ENCOUNTER — TELEPHONE (OUTPATIENT)
Dept: MEDICAL GROUP | Facility: LAB | Age: 66
End: 2021-06-17

## 2021-06-17 NOTE — TELEPHONE ENCOUNTER
[facility] called wanting to know what the Days Supply is for the prescriptions sent today: Tylenol Codeine and Zolpidem.  Please advise.    sophy (Connie/Pharmacist) [phone]

## 2021-06-29 DIAGNOSIS — R60.0 BILATERAL EDEMA OF LOWER EXTREMITY: ICD-10-CM

## 2021-06-29 DIAGNOSIS — I10 ESSENTIAL HYPERTENSION: ICD-10-CM

## 2021-06-29 RX ORDER — HYDROCHLOROTHIAZIDE 25 MG/1
TABLET ORAL
Qty: 90 TABLET | Refills: 0 | Status: SHIPPED | OUTPATIENT
Start: 2021-06-29 | End: 2021-10-14

## 2021-06-29 NOTE — TELEPHONE ENCOUNTER
Received request via: Pharmacy    Was the patient seen in the last year in this department? Yes  3/4/21  Does the patient have an active prescription (recently filled or refills available) for medication(s) requested? No

## 2021-08-24 ENCOUNTER — TELEPHONE (OUTPATIENT)
Dept: MEDICAL GROUP | Facility: LAB | Age: 66
End: 2021-08-24

## 2021-08-24 NOTE — TELEPHONE ENCOUNTER
DOCUMENTATION OF PAR STATUS:    1. Name of Medication & Dose: ambien     2. Name of Prescription Coverage Company & phone #: cover my meds/ [id number]    3. Date Prior Auth Submitted: 8/24/21    4. What information was given to obtain insurance decision? OV NOTES FAXED    5. Prior Auth Status? approved    6. Patient Notified: yes

## 2021-08-24 NOTE — TELEPHONE ENCOUNTER
----- Message from Fadumo Adair sent at 8/22/2021  9:09 AM PDT -----  Regarding: refills needed  ok see you on sept 8th.  the insurance co would not refill ambien until 9/11 per [facility]. they said insurance will then only fill 15 pills per month. can you do a PAR to get 30 pills per month?????  thanks

## 2021-09-08 ENCOUNTER — OFFICE VISIT (OUTPATIENT)
Dept: MEDICAL GROUP | Facility: LAB | Age: 66
End: 2021-09-08
Payer: COMMERCIAL

## 2021-09-08 VITALS
TEMPERATURE: 96.7 F | RESPIRATION RATE: 16 BRPM | OXYGEN SATURATION: 95 % | HEART RATE: 94 BPM | BODY MASS INDEX: 37.22 KG/M2 | SYSTOLIC BLOOD PRESSURE: 128 MMHG | HEIGHT: 64 IN | WEIGHT: 218 LBS | DIASTOLIC BLOOD PRESSURE: 80 MMHG

## 2021-09-08 DIAGNOSIS — G89.29 CHRONIC PAIN OF LEFT KNEE: ICD-10-CM

## 2021-09-08 DIAGNOSIS — M25.551 BILATERAL HIP PAIN: ICD-10-CM

## 2021-09-08 DIAGNOSIS — M15.9 PRIMARY OSTEOARTHRITIS INVOLVING MULTIPLE JOINTS: ICD-10-CM

## 2021-09-08 DIAGNOSIS — Z79.891 USE OF OPIATES FOR THERAPEUTIC PURPOSES: ICD-10-CM

## 2021-09-08 DIAGNOSIS — G47.00 INSOMNIA, UNSPECIFIED TYPE: ICD-10-CM

## 2021-09-08 DIAGNOSIS — Z01.84 ENCOUNTER FOR ANTIBODY RESPONSE EXAMINATION: ICD-10-CM

## 2021-09-08 DIAGNOSIS — M54.50 CHRONIC LOW BACK PAIN, UNSPECIFIED BACK PAIN LATERALITY, UNSPECIFIED WHETHER SCIATICA PRESENT: ICD-10-CM

## 2021-09-08 DIAGNOSIS — M25.552 BILATERAL HIP PAIN: ICD-10-CM

## 2021-09-08 DIAGNOSIS — F41.9 ANXIETY: ICD-10-CM

## 2021-09-08 DIAGNOSIS — G89.29 CHRONIC LOW BACK PAIN, UNSPECIFIED BACK PAIN LATERALITY, UNSPECIFIED WHETHER SCIATICA PRESENT: ICD-10-CM

## 2021-09-08 DIAGNOSIS — M25.562 CHRONIC PAIN OF LEFT KNEE: ICD-10-CM

## 2021-09-08 PROCEDURE — 99214 OFFICE O/P EST MOD 30 MIN: CPT | Performed by: NURSE PRACTITIONER

## 2021-09-08 RX ORDER — ZOLPIDEM TARTRATE 10 MG/1
10 TABLET ORAL NIGHTLY PRN
Qty: 30 TABLET | Refills: 2 | Status: SHIPPED | OUTPATIENT
Start: 2021-09-17 | End: 2021-12-09 | Stop reason: SDUPTHER

## 2021-09-08 RX ORDER — ALPRAZOLAM 0.5 MG/1
0.5 TABLET ORAL
Qty: 30 TABLET | Refills: 2 | Status: SHIPPED | OUTPATIENT
Start: 2021-09-18 | End: 2021-10-18

## 2021-09-08 ASSESSMENT — FIBROSIS 4 INDEX: FIB4 SCORE: 1.05

## 2021-09-08 NOTE — PROGRESS NOTES
"Chief Complaint   Patient presents with   • Medication Management       HPI   Fadumo is a 65-year-old established female here to follow-up on chronic issues and for medication refills.  Her main complaint is persistent, worsening on chronic low back, bilateral hip/groin pain and left knee pain.  She is behind on getting x-rays updated and has not seen physiatry in a few years.  She is struggling with her weight.    Low back pain  Chronic issue and worsening.  Has pain to low back that radiates to left groin and hip.  Tried shots with Dr. Barrios years ago but never returned.  She is interested in physical therapy as this has been helpful for her.  She prefers to have minimal treatment beyond physical therapy.    Bilateral hip pain  Stiff in AM, with movement, in bed and ache.  Physically fairly active right now with home exercises but enjoys her bike.  Left hip hurts more than right - points to left groin.  Hears a lot of clicking / popping / cracking in hips.  Denies any recent injuries or trauma.    Anxiety  Chronic issue.  Controlled with xanax as needed but typically has to take on a daily basis.  Prefers to refrain from any other medication for anxiety at this time.    Insomnia:  Chronic issue.  Has had a stressful last several years.  Falls asleep with Ambien but does not always stay asleep.  Has tried trazodone, Lunesta and temazepam in the past without benefit.  Prefers to stick with Ambien at this point.      Past medical, surgical, family, and social history is reviewed and updated in Epic chart by me today.   Medications and allergies reviewed and updated in Epic chart by me today.     ROS:   As documented in history of present illness above    Exam:  /80 (BP Location: Left arm, Patient Position: Sitting, BP Cuff Size: Large adult)   Pulse 94   Temp 35.9 °C (96.7 °F)   Resp 16   Ht 1.626 m (5' 4\")   Wt 98.9 kg (218 lb)   SpO2 95%   Gen. appears healthy in no distress   Skin appropriate for sex " and age   Neck trachea is midline  Lungs unlabored breathing  Heart regular rate  Neuro gait and station normal   Psych appropriate, calm, interactive, well-groomed    Assessment / Plan / Medical Decision makin. Chronic low back pain, unspecified back pain laterality, unspecified whether sciatica present  -Prefers to refrain from seeing physiatry again at this time.  I encouraged her to work on trunk strength and weight loss.  Referred to physical therapy.  Recommend updated lumbar spine x-rays which are still valid in the computer system.  - REFERRAL TO PHYSICAL THERAPY    2. Chronic pain of left knee  -As in #1.  Declines referral to orthopedics.  - DX-KNEE COMPLETE 4+ LEFT; Future  - REFERRAL TO PHYSICAL THERAPY    3. Bilateral hip pain  -As in #1.    4. Insomnia, unspecified type  -Stable with chronic use of Ambien, prefers not to change this at this time.  Discussed the potential increased long-term risk of memory loss with extended use of Ambien.  - zolpidem (AMBIEN) 10 MG Tab; Take 1 Tablet by mouth at bedtime as needed for Sleep for up to 30 days. TAKE 1 TABLET BY MOUTH AT BEDTIME AS NEEDED FOR SLEEP for 30 days  Dispense: 30 Tablet; Refill: 2    5. Primary osteoarthritis involving multiple joints  -Currently persistent but stable with Celebrex, Tylenol 3, efforts at activity and encouraged her to lose some weight.  Referred to physical therapy.  - acetaminophen-codeine #3 (TYLENOL #3) 300-30 MG Tab; Take 1 Tablet by mouth 3 times a day as needed for up to 30 days.  Dispense: 90 Tablet; Refill: 2    6. Use of opiates for therapeutic purposes  -In prescribing controlled substances to this patient, I certify that I have obtained and reviewed the medical history of Fadumo Adair. I have also made a good janet effort to obtain applicable records from other providers who have treated the patient and records did not demonstrate any increased risk of substance abuse that would prevent me from  prescribing controlled substances.     I have conducted a physical exam and documented it. I have reviewed Ms. Adair’s prescription history as maintained by the [place] Prescription Monitoring Program.     I have assessed the patient’s risk for abuse, dependency, and addiction using the validated Opioid Risk Tool available at https://www.mdcalc.com/mzsdia-yeul-vjmy-ort-narcotic-abuse.     Given the above, I believe the benefits of controlled substance therapy outweigh the risks. The reasons for prescribing controlled substances include non-narcotic, oral analgesic alternatives have been inadequate for pain control. Accordingly, I have discussed the risk and benefits, treatment plan, and alternative therapies with the patient.     - Pain Management Screen; Future  - Controlled Substance Treatment Agreement    7. Encounter for antibody response examination  -Patient requested antibody testing for vaccine, skeptical about getting a booster.  Counseled regarding the importance of a booster.  - Covid-19 IgG ([place]); Future    8. Anxiety  -Persistent.  Prefers to stick with alprazolam as needed for breakthrough anxiety.  Did poorly on SNRI therapy in the past.  Has tried multiple SSRIs and prefers not to go back to that route.  - ALPRAZolam (XANAX) 0.5 MG Tab; Take 1 Tablet by mouth 1 time a day as needed for Sleep for up to 30 days. TAKE 1 TABLET BY MOUTH ONCE DAILY AS NEEDED *DO  NOT  TAKE  EVERYDAY.  DO  NOT  TAKE  WITHIN  4  HOURS  OF  CODEINE  OR  AMBIEN* for 30 days  Dispense: 30 Tablet; Refill: 2      Side effects of all medications prescribed today were discussed with the patient including how to take the medications and proper dosage. Discussed repercussions of not taking the medications as prescribed. Instructed to call the office should she have any negative side effects or problems with the medications.    She understands that she should not take Xanax within 6 hours of driving a car, operating machinery or  drinking alcohol.  She understands the chemically dependent nature of Xanax.    Discussed increased risk of overdose and death when combining benzodiazepines and codeine, particularly with concurrent use of Ambien.  She is very good about following instructions on her medications and has never overdosed.  She rarely drinks alcohol.  She understands that if she were to overdose and die on polypharmacy medications as above by taking them not as prescribed, that I am not legally responsible.

## 2021-09-08 NOTE — ASSESSMENT & PLAN NOTE
Stiff in AM, with movement, in bed and ache.  Physically fairly active right now with home exercises but enjoys her bike.  Left hip hurts more than right - points to left groin.  Hears a lot of clicking / popping / cracking in hips.

## 2021-09-08 NOTE — ASSESSMENT & PLAN NOTE
Chronic issue.  Controlled with xanax as needed but typically has to take on a daily basis.  Prefers to refrain from any other medication for anxiety at this time.

## 2021-09-08 NOTE — ASSESSMENT & PLAN NOTE
Chronic issue and worsening.  Has pain to low back that radiates to left groin and hip.  Tried shots with Dr. Barrios years ago but never returned.

## 2021-10-05 ENCOUNTER — HOSPITAL ENCOUNTER (OUTPATIENT)
Dept: RADIOLOGY | Facility: MEDICAL CENTER | Age: 66
End: 2021-10-05
Attending: NURSE PRACTITIONER
Payer: COMMERCIAL

## 2021-10-05 DIAGNOSIS — G89.29 CHRONIC PAIN OF LEFT KNEE: ICD-10-CM

## 2021-10-05 DIAGNOSIS — M25.552 BILATERAL HIP PAIN: ICD-10-CM

## 2021-10-05 DIAGNOSIS — M25.551 BILATERAL HIP PAIN: ICD-10-CM

## 2021-10-05 DIAGNOSIS — M25.562 CHRONIC PAIN OF LEFT KNEE: ICD-10-CM

## 2021-10-05 DIAGNOSIS — G89.29 CHRONIC BILATERAL LOW BACK PAIN WITH BILATERAL SCIATICA: ICD-10-CM

## 2021-10-05 DIAGNOSIS — M54.41 CHRONIC BILATERAL LOW BACK PAIN WITH BILATERAL SCIATICA: ICD-10-CM

## 2021-10-05 DIAGNOSIS — M54.42 CHRONIC BILATERAL LOW BACK PAIN WITH BILATERAL SCIATICA: ICD-10-CM

## 2021-10-05 PROCEDURE — 73564 X-RAY EXAM KNEE 4 OR MORE: CPT | Mod: LT

## 2021-10-05 PROCEDURE — 72110 X-RAY EXAM L-2 SPINE 4/>VWS: CPT

## 2021-10-05 PROCEDURE — 73521 X-RAY EXAM HIPS BI 2 VIEWS: CPT

## 2021-10-06 ENCOUNTER — PATIENT MESSAGE (OUTPATIENT)
Dept: MEDICAL GROUP | Facility: LAB | Age: 66
End: 2021-10-06

## 2021-10-06 NOTE — TELEPHONE ENCOUNTER
From: Fadumo Adair  To: Nurse Practitioner Ursula Mccord  Sent: 10/6/2021 11:56 AM PDT  Subject: results    harry such bad news, this is why so much pain.    [facility] says they need order for CT Chest-thorax faxed to them    I do need referral to orth doctor.  should I still go to PT which is scheduled for first visit end of month?  Im also scheduled to see dr BLAIR end of month should I see her??

## 2021-10-14 DIAGNOSIS — R60.0 BILATERAL EDEMA OF LOWER EXTREMITY: ICD-10-CM

## 2021-10-14 DIAGNOSIS — I10 ESSENTIAL HYPERTENSION: ICD-10-CM

## 2021-10-14 RX ORDER — HYDROCHLOROTHIAZIDE 25 MG/1
TABLET ORAL
Qty: 90 TABLET | Refills: 3 | Status: SHIPPED | OUTPATIENT
Start: 2021-10-14 | End: 2022-05-10 | Stop reason: SDUPTHER

## 2021-10-20 ENCOUNTER — HOSPITAL ENCOUNTER (OUTPATIENT)
Dept: RADIOLOGY | Facility: MEDICAL CENTER | Age: 66
End: 2021-10-20
Attending: NURSE PRACTITIONER
Payer: COMMERCIAL

## 2021-10-20 PROCEDURE — 71250 CT THORAX DX C-: CPT

## 2021-10-22 ENCOUNTER — OFFICE VISIT (OUTPATIENT)
Dept: PHYSICAL MEDICINE AND REHAB | Facility: MEDICAL CENTER | Age: 66
End: 2021-10-22
Payer: COMMERCIAL

## 2021-10-22 VITALS
OXYGEN SATURATION: 95 % | TEMPERATURE: 97.7 F | HEIGHT: 65 IN | DIASTOLIC BLOOD PRESSURE: 80 MMHG | HEART RATE: 94 BPM | WEIGHT: 219.14 LBS | BODY MASS INDEX: 36.51 KG/M2 | SYSTOLIC BLOOD PRESSURE: 128 MMHG

## 2021-10-22 DIAGNOSIS — M17.12 PRIMARY OSTEOARTHRITIS OF LEFT KNEE: ICD-10-CM

## 2021-10-22 DIAGNOSIS — M16.12 PRIMARY OSTEOARTHRITIS OF LEFT HIP: ICD-10-CM

## 2021-10-22 DIAGNOSIS — M51.36 DDD (DEGENERATIVE DISC DISEASE), LUMBAR: ICD-10-CM

## 2021-10-22 DIAGNOSIS — M47.816 LUMBAR SPONDYLOSIS: ICD-10-CM

## 2021-10-22 PROCEDURE — 99214 OFFICE O/P EST MOD 30 MIN: CPT | Performed by: PHYSICAL MEDICINE & REHABILITATION

## 2021-10-22 ASSESSMENT — PATIENT HEALTH QUESTIONNAIRE - PHQ9
SUM OF ALL RESPONSES TO PHQ QUESTIONS 1-9: 8
5. POOR APPETITE OR OVEREATING: 2 - MORE THAN HALF THE DAYS
CLINICAL INTERPRETATION OF PHQ2 SCORE: 2

## 2021-10-22 ASSESSMENT — FIBROSIS 4 INDEX: FIB4 SCORE: 1.05

## 2021-10-22 ASSESSMENT — PAIN SCALES - GENERAL: PAINLEVEL: 8=MODERATE-SEVERE PAIN

## 2021-10-22 NOTE — PROGRESS NOTES
Follow up patient note  Pain Medicine, Interventional spine and sports physiatry, Physical medicine rehabilitation      Chief complaint:   Chief Complaint   Patient presents with   • Follow-Up     Back,hip and knee pain         HISTORY    Please see new patient note by Dr Barrios,  for more details.     HPI  Patient identification: Fadumo Adair 65 y.o. female presents for follow-up     Interval history:   Fadumo was last seen May 3, 2019.  She returns with report of pain in the low back and right groin bilaterally.  She prioritizes the left hip pain is the most difficult pain which is making it difficult for her to trim her toes and she has seen Dr. Kiser at with plan for hip replacement next few months.  They discussed avoiding steroid injection in the left hip.  Her left knee is the next most painful joint which is significantly aching difficult to determine left leg pain in the groin to the knee during weightbearing.      She has been taking Tylenol 3 usually 1 a day.  She starts physical therapy next week.  She has had some relief with use of Celebrex.  No side effects.  Pain is 8 out of 10 on the NRS.       ROS Red Flags :   Fever, Chills, Sweats: Denies  Involuntary Weight Loss: Denies  Bowel/Bladder Incontinence: Denies  Saddle Anesthesia: Denies    PMHx:   Past Medical History:   Diagnosis Date   • Anemia    • Arrhythmia     mild, pt not sure problem, does not see cardiologist    • Arthritis     hip, knees, back   • Cancer (HCC)     bladder   • Heart burn    • High cholesterol    • HTN    • Hyperlipemia    • Indigestion    • Low back pain 09-28-11    hips, and knees   • Urinary bladder disorder        PSHx:   Past Surgical History:   Procedure Laterality Date   • TRANS URETHRAL RESECTION BLADDER  5/8/2018    Procedure: TRANS URETHRAL RESECTION BLADDER- TUMOR;  Surgeon: Sigifredo Montes M.D.;  Location: SURGERY [facility];  Service: Urology   • CYSTOSCOPY  10/11/2011    Performed by DOREEN  MOLINA ALVARADO at SURGERY [facility] ORS   • VAGINAL PERINEAL EXAM REPAIR  10/11/2011    Performed by MOLINA LOGAN at SURGERY [facility] ORS   • HYSTERECTOMY ROBOTIC  10/11/2011    Performed by MOLINA LOGAN at SURGERY [facility] ORS   • PRIMARY C SECTION      x  2   • TONSILLECTOMY AND ADENOIDECTOMY      age 7       Family history     Family History   Problem Relation Age of Onset   • Cancer Mother    • Heart Disease Father        Medications:   Current Outpatient Medications   Medication   • hydroCHLOROthiazide (HYDRODIURIL) 25 MG Tab   • lisinopril (PRINIVIL) 20 MG Tab   • atorvastatin (LIPITOR) 40 MG Tab   • celecoxib (CELEBREX) 200 MG Cap   • vitamin D (CHOLECALCIFEROL) 1000 UNIT Tab   • coenzyme Q-10 30 MG capsule   • NARCAN 4 MG/0.1ML Liquid     No current facility-administered medications for this visit.       Allergies:   No Known Allergies    Social Hx:   Social History     Socioeconomic History   • Marital status:      Spouse name: Not on file   • Number of children: Not on file   • Years of education: Not on file   • Highest education level: Not on file   Occupational History   • Not on file   Tobacco Use   • Smoking status: Former Smoker     Packs/day: 0.50     Years: 30.00     Pack years: 15.00     Types: Cigarettes     Quit date: 2/1/2011     Years since quitting: 10.7   • Smokeless tobacco: Never Used   Substance and Sexual Activity   • Alcohol use: Yes     Alcohol/week: 0.5 oz     Types: 1 Standard drinks or equivalent per week     Comment: 4 a week    • Drug use: No   • Sexual activity: Not on file   Other Topics Concern   •  Service No   • Blood Transfusions No   • Caffeine Concern No   • Occupational Exposure No   • Hobby Hazards No   • Sleep Concern Yes   • Stress Concern No   • Weight Concern Yes   • Special Diet No   • Back Care No   • Exercise Yes   • Bike Helmet Yes   • Seat Belt Yes   • Self-Exams No   Social History Narrative   • Not on file     Social  "Determinants of Health     Financial Resource Strain:    • Difficulty of Paying Living Expenses:    Food Insecurity:    • Worried About Running Out of Food in the Last Year:    • Ran Out of Food in the Last Year:    Transportation Needs:    • Lack of Transportation (Medical):    • Lack of Transportation (Non-Medical):    Physical Activity:    • Days of Exercise per Week:    • Minutes of Exercise per Session:    Stress:    • Feeling of Stress :    Social Connections:    • Frequency of Communication with Friends and Family:    • Frequency of Social Gatherings with Friends and Family:    • Attends [employer] Services:    • Active Member of Clubs or Organizations:    • Attends Club or Organization Meetings:    • Marital Status:    Intimate Partner Violence:    • Fear of Current or Ex-Partner:    • Emotionally Abused:    • Physically Abused:    • Sexually Abused:          EXAMINATION     Physical Exam:   Vitals: /80 (BP Location: Right arm, Patient Position: Sitting, BP Cuff Size: Adult long)   Pulse 94   Temp 36.5 °C (97.7 °F) (Temporal)   Ht 1.651 m (5' 5\")   Wt 99.4 kg (219 lb 2.2 oz)   SpO2 95%     Constitutional:   Body Habitus: Body mass index is 36.47 kg/m².  Cooperation: Fully cooperates with exam  Appearance: Well-groomed no disheveled in no acute distress    Respiratory-  breathing comfortable on room air, no audible wheezing  Cardiovascular- capillary refills less than 2 seconds. No lower extremity edema is noted.   Psychiatric- alert and oriented ×3. Normal affect.   Musculoskeletal  Trendelenburg gait  Decreased lumbar spine range of motion extension causes bilateral groin pain quadrant loading minimally painful bilaterally  4-5 left hip abduction 4 out of 5 right hip abduction, pain limits left hip flexion otherwise no focal motor deficits in the lower extremities bilaterally  No tenderness in the extremities bilaterally    Hip  Significant decreased range of motion internal and external rotation " pain with range of motion.    Knees  Bilateral crepitus.  Mild effusion left knee medial lateral joint line tenderness.  Negative Lachman's        MEDICAL DECISION MAKING    DATA    Labs:   Lab Results   Component Value Date/Time    SODIUM 138 08/26/2020 07:46 AM    POTASSIUM 4.1 08/26/2020 07:46 AM    CHLORIDE 98 08/26/2020 07:46 AM    CO2 26 08/26/2020 07:46 AM    GLUCOSE 120 (H) 08/26/2020 07:46 AM    BUN 26 (H) 08/26/2020 07:46 AM    CREATININE 0.80 08/26/2020 07:46 AM    CREATININE 0.72 05/12/2011 07:40 AM    BUNCREATRAT 35 (H) 05/12/2011 07:40 AM    GLOMRATE >59 09/15/2009 09:00 AM        No results found for: PROTHROMBTM, INR     Lab Results   Component Value Date/Time    WBC 5.0 08/26/2020 07:46 AM    WBC 6.0 05/12/2011 07:40 AM    RBC 4.26 08/26/2020 07:46 AM    RBC 4.08 05/12/2011 07:40 AM    HEMOGLOBIN 12.2 08/26/2020 07:46 AM    HEMATOCRIT 38.4 08/26/2020 07:46 AM    MCV 90.1 08/26/2020 07:46 AM    MCV 91 05/12/2011 07:40 AM    MCH 28.6 08/26/2020 07:46 AM    MCH 28.9 05/12/2011 07:40 AM    MCHC 31.8 (L) 08/26/2020 07:46 AM    MPV 11.4 08/26/2020 07:46 AM    NEUTSPOLYS 51.10 08/26/2020 07:46 AM    LYMPHOCYTES 36.80 08/26/2020 07:46 AM    MONOCYTES 8.10 08/26/2020 07:46 AM    EOSINOPHILS 2.80 08/26/2020 07:46 AM    BASOPHILS 0.80 08/26/2020 07:46 AM    HYPOCHROMIA 1+ 05/13/2014 07:20 AM    ANISOCYTOSIS 2+ 09/28/2011 01:16 PM        Lab Results   Component Value Date/Time    HBA1C 6.2 (H) 08/26/2020 07:46 AM          Imaging: I personally reviewed following images  X-ray left knee October 5, 2021  There is tricompartmental osteoarthritis of the left knee with significant patellofemoral osteophytes    X-ray lumbar spine dated October 5, 2021  There is multilevel degenerative disc disease with minimal retrolisthesis at T12-L1 L1-2 and L3 with anterolisthesis at L4-5.  Facet arthropathy    X-ray of hips October 5, 2021  There is severe left hip osteoarthritis and moderate right hip osteoarthritis    I  reviewed the following radiology reports  Xray left knee 10/05/2021  IMPRESSION:  1.  No acute fracture.   2.  Moderate osteoarthritis of the left knee joint is most pronounced in the medial and patellofemoral compartments.    Xray hips October 5, 2021  IMPRESSION:     1.  Severe osteoarthritis of the left hip joint, significant interval progression from the prior exam     2.  Moderate osteoarthritis of the right joint. Findings have progressed slightly from the prior exam    Xray lumbar 10/05/2021  IMPRESSION:        1.  Mild multilevel degenerative disc disease.     2.  Multilevel degenerative retrolisthesis at T12-L1, L1-L2, and L2-3     3.  Minimal anterolisthesis at L4-5.     4.  Mild facet arthropathy in the lower lumbar spine.          DIAGNOSIS   Visit Diagnoses     ICD-10-CM   1. Primary osteoarthritis of left knee  M17.12   2. Primary osteoarthritis of left hip  M16.12   3. Lumbar spondylosis  M47.816   4. DDD (degenerative disc disease), lumbar  M51.36         ASSESSMENT and PLAN:     Fadumo Adair 65 y.o. female seen for above    Fadumo was seen today for follow-up.    Diagnoses and all orders for this visit:    Primary osteoarthritis of left knee  -     REFERRAL TO OUTPATIENT INTERVENTIONAL PAIN CLINIC    Primary osteoarthritis of left hip    Lumbar spondylosis    DDD (degenerative disc disease), lumbar      1.  We discussed that she has upcoming plans to have left hip replacement with Dr. Sanders scheduled for January 2022.  We will avoid left hip injection.  2.  Discussed that her next pain priority is the left knee.  We discussed left knee intra-articular joint injection.  The risks benefits and alternatives to this procedure were discussed and the patient wishes to proceed with the procedure. Risks include but are not limited to damage to surrounding structures, infection, bleeding, worsening of pain which can be permanent, weakness which can be permanent. Benefits include pain relief, improved  function. Alternatives includes not doing the procedure.    3.  Encouraged her to follow-up with plans for physical therapy.  Discussed that she can also review options to assist dressing ADLs as it relates to left hip pain and significant limitation to range of motion.  She does have significant weakness in her hip abductors and strengthening these prior to surgery will likely improve her recovery and outcome.  4.  Discussed her concerns about rheumatologic disease.  We discussed the most recent lab checks with rheumatoid factor less than 10 January 13, 2017.  Suspect osteoarthritis primarily accounts for her symptoms.  5.  Advised to avoid taking more than 4000mg Tylenol a day from all sources        Follow up: Office injection    Thank you for allowing me to participate in the care of this patient. If you have any questions please not hesitate to contact me.    Please note that this dictation was created using voice recognition software. I have made every reasonable attempt to correct obvious errors but there may be errors of grammar and content that I may have overlooked prior to finalization of this note.      Meet Barrios MD  Interventional Spine and Sports Physiatry  Physical Medicine and Rehabilitation  [facility]      CC: Dr. Juan Miguel Trimble

## 2021-10-27 ENCOUNTER — PHYSICAL THERAPY (OUTPATIENT)
Dept: PHYSICAL THERAPY | Facility: REHABILITATION | Age: 66
End: 2021-10-27
Attending: NURSE PRACTITIONER
Payer: COMMERCIAL

## 2021-10-27 DIAGNOSIS — M25.562 CHRONIC PAIN OF LEFT KNEE: ICD-10-CM

## 2021-10-27 DIAGNOSIS — G89.29 CHRONIC PAIN OF LEFT KNEE: ICD-10-CM

## 2021-10-27 DIAGNOSIS — M54.50 CHRONIC LOW BACK PAIN, UNSPECIFIED BACK PAIN LATERALITY, UNSPECIFIED WHETHER SCIATICA PRESENT: ICD-10-CM

## 2021-10-27 DIAGNOSIS — G89.29 CHRONIC LOW BACK PAIN, UNSPECIFIED BACK PAIN LATERALITY, UNSPECIFIED WHETHER SCIATICA PRESENT: ICD-10-CM

## 2021-10-27 DIAGNOSIS — M25.552 LEFT HIP PAIN: ICD-10-CM

## 2021-10-27 PROCEDURE — 97110 THERAPEUTIC EXERCISES: CPT

## 2021-10-27 PROCEDURE — 97161 PT EVAL LOW COMPLEX 20 MIN: CPT

## 2021-10-27 ASSESSMENT — ENCOUNTER SYMPTOMS
PAIN SCALE AT LOWEST: 4
PAIN TIMING: ALL DAY
QUALITY: ACHING
PAIN SCALE: 4
PAIN SCALE AT HIGHEST: 8

## 2021-10-27 NOTE — OP THERAPY EVALUATION
Outpatient Physical Therapy  INITIAL EVALUATION    [facility] [place]  [address], [address]  [facility] [address]  Phone:  [phone]  Fax:  [phone]    Date of Evaluation: 10/27/2021    Patient: Fadumo Adair  YOB: 1955  MRN: [id number]     Referring Provider: DORYS Pineda  [id number] [address]   Referring Diagnosis Chronic low back pain, unspecified back pain laterality, unspecified whether sciatica present [M54.50, G89.29];Chronic pain of left knee [M25.562, G89.29]     Time Calculation  Start time: 905  Stop time: 50 Time Calculation (min): 45 minutes         Chief Complaint: Hip and back pain    Visit Diagnoses     ICD-10-CM   1. Chronic low back pain, unspecified back pain laterality, unspecified whether sciatica present  M54.50    G89.29   2. Chronic pain of left knee  M25.562    G89.29   3. Left hip pain  M25.552       Subjective:   History of Present Illness:     Mechanism of injury:  Fadumo Adair is a 65 y.o. female that presents to therapy with left hip pain and back pain. She has a total left hip arthroplasty scheduled in January of next year but has a vacation in the meantime that she would like to enjoy.  She states that symptoms came on with insidious onset. Her pain is located along he lateral left hip and groin and anterolateral left knee. She reports that on occasion her back is also irritated and feels stiff. Patient denies fevers/chills, numbness/weakness of lower extremities, bowel/bladder incontinence, saddle anesthesia.     Aggravating factors: bending to tie shoes, reaching, steps and stairs, walking  < 5min  Relieving factors: topicals, OTC, laying down, celebrex    ADL limitations: limited standing and walking tolerance due to pain     Pain:     Current pain ratin    At best pain ratin    At worst pain ratin    Quality:  Aching    Pain timing:  All day      Past Medical  History:   Diagnosis Date   • Anemia    • Arrhythmia     mild, pt not sure problem, does not see cardiologist    • Arthritis     hip, knees, back   • Cancer (HCC)     bladder   • Heart burn    • High cholesterol    • HTN    • Hyperlipemia    • Indigestion    • Low back pain 09-28-11    hips, and knees   • Urinary bladder disorder      Past Surgical History:   Procedure Laterality Date   • TRANS URETHRAL RESECTION BLADDER  5/8/2018    Procedure: TRANS URETHRAL RESECTION BLADDER- TUMOR;  Surgeon: Sigifredo Montes M.D.;  Location: SURGERY [facility];  Service: Urology   • CYSTOSCOPY  10/11/2011    Performed by MOLINA LOGAN at SURGERY [facility]   • VAGINAL PERINEAL EXAM REPAIR  10/11/2011    Performed by MOLINA LOGAN at SURGERY [facility]   • HYSTERECTOMY ROBOTIC  10/11/2011    Performed by MOLINA LOGAN at SURGERY [facility]   • PRIMARY C SECTION      x  2   • TONSILLECTOMY AND ADENOIDECTOMY      age 7     Social History     Tobacco Use   • Smoking status: Former Smoker     Packs/day: 0.50     Years: 30.00     Pack years: 15.00     Types: Cigarettes     Quit date: 2/1/2011     Years since quitting: 10.7   • Smokeless tobacco: Never Used   Substance Use Topics   • Alcohol use: Yes     Alcohol/week: 0.5 oz     Types: 1 Standard drinks or equivalent per week     Comment: 4 a week      Family and Occupational History     Socioeconomic History   • Marital status:      Spouse name: Not on file   • Number of children: Not on file   • Years of education: Not on file   • Highest education level: Not on file   Occupational History   • Not on file       Objective     Active Range of Motion   Left Hip   Abduction: 20 degrees   External rotation (90/90): 25 degrees   Internal rotation (90/90): 10 degrees     Right Hip   Abduction: 30 degrees   External rotation (90/90): 40 degrees   Internal rotation (90/90): 30 degrees     Joint Play     Additional hip joint play details:  Passive=AROM for L hip         Strength:      Back   Flexion: 4  Extension: 4    Additional Strength Details  Within available ROM strength is intact    Tests     Left Hip   Positive NGA, FADIR and scour.     Right Hip   Positive NGA, FADIR and scour.         Therapeutic Exercises (CPT 07845):       Therapeutic Exercise Summary: HEP instruction/performance and development. Handout provided and exercises located below:  Access Code: [email or web address]  URL: [email or web address]  Date: 10/27/2021  Prepared by: Joao Neri    Exercises        Supine Hip Internal and External Rotation - 2 x daily - 20 reps      Supine Pelvic Tilt - 2 x daily - 20 reps      Supine Gluteal Sets - 2 x daily - 2 sets - 10 reps - 5 hold      Standing Hip Internal and External Rotation AAROM on Stool - 2 x daily - 3 sets - 12 reps      Time-based treatments/modalities:    Physical Therapy Timed Treatment Charges  Therapeutic exercise minutes (CPT 83958): 10 minutes      Assessment, Response and Plan:   Assessment details:  Fadumo Adair is a 65 y.o. female with signs and symptoms consistent with significant hip stiffness and pain along with proximal core weakness. She requires skilled physical therapy intervention to decrease pain, increase range of motion, increase functional mobility, improve ADL completion and establish a home exercise program.  Goals:   Short Term Goals:   1. Patient will be Independent with prescribed Home Exercise Program (HEP) and will be able to demonstrate exercises without cues for improved overall symptoms/activity tolerance.   2. Pt will improve ability to stand and walk for 2-3 minutes with pain< 2/10.  Short term goal time span:  2-4 weeks      Long Term Goals:    3. Pt will improve ability to ambulate for >5 minutes with pain less than 2/10.   4. Pt will improve LEFS score to greater than 26/80 indicative of improved function and reduced perceived disability.  Long term goal time span:  4-6  weeks    Plan:   Planned therapy interventions:  Therapeutic Exercise (CPT 05312), Therapeutic Activities (CPT 54760), Manual Therapy (CPT 16911), Neuromuscular Re-education (CPT 07918), E Stim Unattended (CPT 11777) and Gait Training (CPT 52983)  Frequency:  1x week  Duration in weeks:  6  Discussed with:  Patient    Functional Assessment Used  PT Functional Assessment Tool Used: LEFS  PT Functional Assessment Score: 16/80     Referring provider co-signature:  I have reviewed this plan of care and my co-signature certifies the need for services.    Certification Period: 10/27/2021 to  12/08/21    Physician Signature: ________________________________ Date: ______________

## 2021-11-04 ENCOUNTER — PHYSICAL THERAPY (OUTPATIENT)
Dept: PHYSICAL THERAPY | Facility: REHABILITATION | Age: 66
End: 2021-11-04
Attending: NURSE PRACTITIONER
Payer: COMMERCIAL

## 2021-11-04 DIAGNOSIS — M54.50 CHRONIC LOW BACK PAIN, UNSPECIFIED BACK PAIN LATERALITY, UNSPECIFIED WHETHER SCIATICA PRESENT: ICD-10-CM

## 2021-11-04 DIAGNOSIS — G89.29 CHRONIC LOW BACK PAIN, UNSPECIFIED BACK PAIN LATERALITY, UNSPECIFIED WHETHER SCIATICA PRESENT: ICD-10-CM

## 2021-11-04 PROCEDURE — 97110 THERAPEUTIC EXERCISES: CPT

## 2021-11-04 NOTE — OP THERAPY DAILY TREATMENT
Outpatient Physical Therapy  DAILY TREATMENT     [facility] [place]  [address], [address]  [facility] [address]  Phone:  [phone]  Fax:  [phone]    Date: 11/04/2021    Patient: Fadumo Adair  YOB: 1955  MRN: [id number]     Time Calculation    Start time: 0945  Stop time: 1030 Time Calculation (min): 45 minutes         Chief Complaint: L hip  Visit #: 2    SUBJECTIVE:  Notes 80% compliance with HEP. Notes no specific changes in terms of increased pain or soreness.     OBJECTIVE:  Current objective measures: Left hip limited internal and external rotation          Therapeutic Exercises (CPT 19711):     1. Nustep, lvl 1 x 10min    2. SHuttle, 4c 2x 2min    3. Supine bolster glute bridge, x 20    4. Supine hip switch, 1min x 2    5. SLR x 10 each     6. Supine hip sliders, x 25, lvl 1 band x 15    7. Standing ankle rocker , 1min      Therapeutic Exercise Summary: HEP instruction/performance and development. Handout provided and exercises located below:  Access Code: [email or web address]  URL: [email or web address]  Date: 10/27/2021  Prepared by: Joao Neri    Exercises        Supine Hip Internal and External Rotation - 2 x daily - 20 reps      Supine Pelvic Tilt - 2 x daily - 20 reps      Supine Gluteal Sets - 2 x daily - 2 sets - 10 reps - 5 hold      Standing Hip Internal and External Rotation AAROM on Stool - 2 x daily - 3 sets - 12 reps      Time-based treatments/modalities:    Physical Therapy Timed Treatment Charges  Therapeutic exercise minutes (CPT 06382): 45 minutes      Pain rating (1-10) before treatment:  1  Pain rating (1-10) after treatment:  1    ASSESSMENT:   Response to treatment: Overall symptoms controlled and not worsening with current exercises. Plan to progress exercises as able without pain along with progression of functional movement as tolerated.     PLAN/RECOMMENDATIONS:   Plan for treatment: therapy treatment to continue next visit.  Planned  interventions for next visit: continue with current treatment.

## 2021-11-08 ENCOUNTER — PHYSICAL THERAPY (OUTPATIENT)
Dept: PHYSICAL THERAPY | Facility: REHABILITATION | Age: 66
End: 2021-11-08
Attending: NURSE PRACTITIONER
Payer: COMMERCIAL

## 2021-11-08 DIAGNOSIS — M25.552 LEFT HIP PAIN: ICD-10-CM

## 2021-11-08 DIAGNOSIS — G89.29 CHRONIC PAIN OF LEFT KNEE: ICD-10-CM

## 2021-11-08 DIAGNOSIS — M25.562 CHRONIC PAIN OF LEFT KNEE: ICD-10-CM

## 2021-11-08 DIAGNOSIS — G89.29 CHRONIC LOW BACK PAIN, UNSPECIFIED BACK PAIN LATERALITY, UNSPECIFIED WHETHER SCIATICA PRESENT: ICD-10-CM

## 2021-11-08 DIAGNOSIS — M54.50 CHRONIC LOW BACK PAIN, UNSPECIFIED BACK PAIN LATERALITY, UNSPECIFIED WHETHER SCIATICA PRESENT: ICD-10-CM

## 2021-11-08 PROCEDURE — 97110 THERAPEUTIC EXERCISES: CPT

## 2021-11-08 NOTE — OP THERAPY DAILY TREATMENT
Outpatient Physical Therapy  DAILY TREATMENT     [facility] [place]  [address]  [facility] [facility] [address]  Phone:  [phone]  Fax:  [phone]    Date: 11/08/2021    Patient: Fadumo Adair  YOB: 1955  MRN: [id number]     Time Calculation    Start time: 1340  Stop time: 1415 Time Calculation (min): 35 minutes         Chief Complaint: L hip  Visit #: 3    SUBJECTIVE:  Notes that her pain has been elevated from going to the grocery store prior to today's visit. Comes to therapy 10 minutes late. Notes 7-8/10pain with standing/ walking.     OBJECTIVE:  Current objective measures: Left hip limited internal and external rotation, + scour          Therapeutic Exercises (CPT 32245):     1. Nustep, lvl 1 x 10min    2. SHuttle, 4c 2x 2min    3. Supine bolster glute bridge, x 20    4. Supine hip switch, 1min x 2    5. SLR x 10 each     6. Supine hip sliders, x 25, lvl 1 band x 15    7. Standing ankle rocker , 1min      Therapeutic Exercise Summary: HEP instruction/performance and development. Handout provided and exercises located below:  Access Code: [email or web address]  URL: [email or web address]  Date: 10/27/2021  Prepared by: Joao Neri    Exercises        Supine Hip Internal and External Rotation - 2 x daily - 20 reps      Supine Pelvic Tilt - 2 x daily - 20 reps      Supine Gluteal Sets - 2 x daily - 2 sets - 10 reps - 5 hold      Standing Hip Internal and External Rotation AAROM on Stool - 2 x daily - 3 sets - 12 reps      Time-based treatments/modalities:    Physical Therapy Timed Treatment Charges  Therapeutic exercise minutes (CPT 50079): 35 minutes      Pain rating (1-10) before treatment:  1  Pain rating (1-10) after treatment:  1    ASSESSMENT:   Response to treatment:  Symptoms of stiffness and pain continue and are controlled. Progression of functional movement to decrease as weightbearing movements continued to be aggravating.     PLAN/RECOMMENDATIONS:   Plan  for treatment: therapy treatment to continue next visit.  Planned interventions for next visit: continue with current treatment.

## 2021-11-18 ENCOUNTER — PHYSICAL THERAPY (OUTPATIENT)
Dept: PHYSICAL THERAPY | Facility: REHABILITATION | Age: 66
End: 2021-11-18
Attending: NURSE PRACTITIONER
Payer: COMMERCIAL

## 2021-11-18 DIAGNOSIS — G89.29 CHRONIC LOW BACK PAIN, UNSPECIFIED BACK PAIN LATERALITY, UNSPECIFIED WHETHER SCIATICA PRESENT: ICD-10-CM

## 2021-11-18 DIAGNOSIS — M54.50 CHRONIC LOW BACK PAIN, UNSPECIFIED BACK PAIN LATERALITY, UNSPECIFIED WHETHER SCIATICA PRESENT: ICD-10-CM

## 2021-11-18 DIAGNOSIS — M25.552 LEFT HIP PAIN: ICD-10-CM

## 2021-11-18 PROCEDURE — 97110 THERAPEUTIC EXERCISES: CPT

## 2021-11-18 NOTE — OP THERAPY DAILY TREATMENT
Outpatient Physical Therapy  DAILY TREATMENT     [facility] Outpatient Physical Therapy [place]  [address], [address]  [place] [facility] [address]  Phone:  [phone]  Fax:  [phone]    Date: 11/18/2021    Patient: Fadumo Adair  YOB: 1955  MRN: [id number]     Time Calculation    Start time: 0900  Stop time: 0940 Time Calculation (min): 40 minutes         Chief Complaint: L hip  Visit #: 4    SUBJECTIVE:  Reports no pain this morning and that she typically feels good after leaving therapy. Notes that her pain gets worse at the day goes on/ as she is standing and walking more.     OBJECTIVE:  Current objective measures: Left hip limited internal and external rotation, + scour          Therapeutic Exercises (CPT 70414):     1. Nustep, lvl 2 x 12min    2. SHuttle, 4c 2x 2min, NT    3. Supine bolster glute bridge, x 20    4. Supine hip switch, 1min x 2    5. SLR x 10 each     6. Supine hip sliders, x 25, lvl 1 band x 15    7. Standing ankle rocker , 1min    8. Supine clamshell, purple x 20    9. Leg swing in standing, disctraction x8#      Therapeutic Exercise Summary: HEP instruction/performance and development. Handout provided and exercises located below:  Access Code: [email or web address]  URL: [email or web address]  Date: 10/27/2021  Prepared by: Joao Neri    Exercises        Supine Hip Internal and External Rotation - 2 x daily - 20 reps      Supine Pelvic Tilt - 2 x daily - 20 reps      Supine Gluteal Sets - 2 x daily - 2 sets - 10 reps - 5 hold      Standing Hip Internal and External Rotation AAROM on Stool - 2 x daily - 3 sets - 12 reps      Time-based treatments/modalities:    Physical Therapy Timed Treatment Charges  Therapeutic exercise minutes (CPT 95524): 40 minutes      Pain rating (1-10) before treatment:  0  Pain rating (1-10) after treatment:  0    ASSESSMENT:   Response to treatment:  Symptoms of stiffness continue. Pain with activities is controlled but consistent outside of therapy.  F/u next week for continued enforcement of pain free movement and strengthening.      PLAN/RECOMMENDATIONS:   Plan for treatment: therapy treatment to continue next visit.  Planned interventions for next visit: continue with current treatment.

## 2021-11-23 NOTE — OP THERAPY DAILY TREATMENT
Outpatient Physical Therapy  DAILY TREATMENT     [facility] Outpatient Physical Therapy [place]  [address]  [place] [address]  Phone:  [phone]  Fax:  [phone]    Date: 11/24/2021    Patient: Fadumo Adair  YOB: 1955  MRN: [id number]     Time Calculation    Start time: 0945  Stop time: 1030 Time Calculation (min): 45 minutes         Chief Complaint: L hip  Visit #: 5    SUBJECTIVE:  Reports some mild pain with riding her bike around her neighborhood. Notes that the pain gets worse as the day goes on. Has orthopedic appt for next Thursday to seek a hip replacement for mid to late January.     OBJECTIVE:  Current objective measures: Left hip limited internal and external rotation, + scour          Therapeutic Exercises (CPT 16998):     1. Nustep, lvl 2 x 12min    2. SHuttle, 4c 2x 2min, NT    3. Supine bolster glute bridge, x 20    4. Supine hip switch, 1min x 2    5. SLR x 10 each     6. Supine clamshell, purple x 20      Therapeutic Exercise Summary: HEP instruction/performance and development. Handout provided and exercises located below:  Access Code: [email or web address]  URL: [email or web address]  Date: 10/27/2021  Prepared by: Joao Neri    Exercises        Supine Hip Internal and External Rotation - 2 x daily - 20 reps      Supine Pelvic Tilt - 2 x daily - 20 reps      Supine Gluteal Sets - 2 x daily - 2 sets - 10 reps - 5 hold      Standing Hip Internal and External Rotation AAROM on Stool - 2 x daily - 3 sets - 12 reps    Therapeutic Treatments and Modalities:     1. Manual Therapy (CPT 43694), Grade III Long axis distraction, 3x 20, lateral and inferior mobs grade III 3x 30 each    Time-based treatments/modalities:    Physical Therapy Timed Treatment Charges  Manual therapy minutes (CPT 59391): 15 minutes  Therapeutic exercise minutes (CPT 94843): 30 minutes      Pain rating (1-10) before treatment:  0  Pain rating (1-10) after treatment:  0    ASSESSMENT:   Response to  treatment:  Symptoms of stiffness continue. Pain with activities is controlled but consistent outside of therapy. Orthopedic referral is appropriate given long term symptoms and impairment. F/u next week for maintained mobility and decreased pain.     PLAN/RECOMMENDATIONS:   Plan for treatment: therapy treatment to continue next visit.  Planned interventions for next visit: continue with current treatment.

## 2021-11-24 ENCOUNTER — PHYSICAL THERAPY (OUTPATIENT)
Dept: PHYSICAL THERAPY | Facility: REHABILITATION | Age: 66
End: 2021-11-24
Attending: NURSE PRACTITIONER
Payer: COMMERCIAL

## 2021-11-24 DIAGNOSIS — G89.29 CHRONIC LOW BACK PAIN, UNSPECIFIED BACK PAIN LATERALITY, UNSPECIFIED WHETHER SCIATICA PRESENT: ICD-10-CM

## 2021-11-24 DIAGNOSIS — M54.50 CHRONIC LOW BACK PAIN, UNSPECIFIED BACK PAIN LATERALITY, UNSPECIFIED WHETHER SCIATICA PRESENT: ICD-10-CM

## 2021-11-24 DIAGNOSIS — M25.552 LEFT HIP PAIN: ICD-10-CM

## 2021-11-24 PROCEDURE — 97140 MANUAL THERAPY 1/> REGIONS: CPT

## 2021-11-24 PROCEDURE — 97110 THERAPEUTIC EXERCISES: CPT

## 2021-11-30 ENCOUNTER — OFFICE VISIT (OUTPATIENT)
Dept: PHYSICAL MEDICINE AND REHAB | Facility: MEDICAL CENTER | Age: 66
End: 2021-11-30
Payer: COMMERCIAL

## 2021-11-30 VITALS
TEMPERATURE: 96.8 F | DIASTOLIC BLOOD PRESSURE: 70 MMHG | BODY MASS INDEX: 36.77 KG/M2 | HEART RATE: 111 BPM | OXYGEN SATURATION: 91 % | WEIGHT: 220.68 LBS | SYSTOLIC BLOOD PRESSURE: 132 MMHG | HEIGHT: 65 IN

## 2021-11-30 DIAGNOSIS — M51.36 DDD (DEGENERATIVE DISC DISEASE), LUMBAR: ICD-10-CM

## 2021-11-30 DIAGNOSIS — M47.816 LUMBAR SPONDYLOSIS: ICD-10-CM

## 2021-11-30 DIAGNOSIS — M16.12 PRIMARY OSTEOARTHRITIS OF LEFT HIP: ICD-10-CM

## 2021-11-30 DIAGNOSIS — M17.12 PRIMARY OSTEOARTHRITIS OF LEFT KNEE: ICD-10-CM

## 2021-11-30 PROCEDURE — 99214 OFFICE O/P EST MOD 30 MIN: CPT | Mod: 25 | Performed by: PHYSICAL MEDICINE & REHABILITATION

## 2021-11-30 PROCEDURE — 20611 DRAIN/INJ JOINT/BURSA W/US: CPT | Mod: LT | Performed by: PHYSICAL MEDICINE & REHABILITATION

## 2021-11-30 RX ORDER — DEXAMETHASONE SODIUM PHOSPHATE 4 MG/ML
4 INJECTION, SOLUTION INTRA-ARTICULAR; INTRALESIONAL; INTRAMUSCULAR; INTRAVENOUS; SOFT TISSUE ONCE
Status: COMPLETED | OUTPATIENT
Start: 2021-11-30 | End: 2021-11-30

## 2021-11-30 RX ADMIN — DEXAMETHASONE SODIUM PHOSPHATE 4 MG: 4 INJECTION, SOLUTION INTRA-ARTICULAR; INTRALESIONAL; INTRAMUSCULAR; INTRAVENOUS; SOFT TISSUE at 10:25

## 2021-11-30 ASSESSMENT — PAIN SCALES - GENERAL: PAINLEVEL: 3=SLIGHT PAIN

## 2021-11-30 ASSESSMENT — PATIENT HEALTH QUESTIONNAIRE - PHQ9
CLINICAL INTERPRETATION OF PHQ2 SCORE: 2
5. POOR APPETITE OR OVEREATING: 1 - SEVERAL DAYS

## 2021-11-30 ASSESSMENT — FIBROSIS 4 INDEX: FIB4 SCORE: 1.05

## 2021-11-30 NOTE — PROGRESS NOTES
Follow up patient note  Pain Medicine, Interventional spine and sports physiatry, Physical medicine rehabilitation      Chief complaint:   Chief Complaint   Patient presents with   • Follow-Up     Knee pain         HISTORY    Please see new patient note by Dr Barrios,  for more details.     HPI  Patient identification: Fadumo Adair 65 y.o. female presents for follow-up     Interval history:     Going to [place].  She is going to see Dr. Myrick, her daughter has encouraged her to get a second opinion.  Likely, looking at hip replacement in 2022.  Left hip pain continues to be limiting.      She has been going to PT, but does not feel that this has been all that helpful.  Trying to work on home program and strengthening before surgery.      She has been taking Tylenol #3 usually up to 3 a day.  She has had some relief with use of Celebrex.  No side effects.  Pain is 5/10 on the NRS    Here for left knee injection.       ROS Red Flags :   Fever, Chills, Sweats: Denies  Involuntary Weight Loss: Denies  Bowel/Bladder Incontinence: Denies  Saddle Anesthesia: Denies    PMHx:   Past Medical History:   Diagnosis Date   • Anemia    • Arrhythmia     mild, pt not sure problem, does not see cardiologist    • Arthritis     hip, knees, back   • Cancer (HCC)     bladder   • Heart burn    • High cholesterol    • HTN    • Hyperlipemia    • Indigestion    • Low back pain 09-28-11    hips, and knees   • Urinary bladder disorder        PSHx:   Past Surgical History:   Procedure Laterality Date   • TRANS URETHRAL RESECTION BLADDER  5/8/2018    Procedure: TRANS URETHRAL RESECTION BLADDER- TUMOR;  Surgeon: Sigifredo Montes M.D.;  Location: SURGERY [facility];  Service: Urology   • CYSTOSCOPY  10/11/2011    Performed by MOLINA LOGAN at SURGERY [facility]   • VAGINAL PERINEAL EXAM REPAIR  10/11/2011    Performed by MOLINA LOGAN at [facility]   • HYSTERECTOMY ROBOTIC  10/11/2011    Performed by DOREEN  MOLINA ALVARADO at SURGERY [facility]   • PRIMARY C SECTION      x  2   • TONSILLECTOMY AND ADENOIDECTOMY      age 7       Family history     Family History   Problem Relation Age of Onset   • Cancer Mother    • Heart Disease Father        Medications:   Current Outpatient Medications   Medication   • hydroCHLOROthiazide (HYDRODIURIL) 25 MG Tab   • lisinopril (PRINIVIL) 20 MG Tab   • atorvastatin (LIPITOR) 40 MG Tab   • celecoxib (CELEBREX) 200 MG Cap   • vitamin D (CHOLECALCIFEROL) 1000 UNIT Tab   • coenzyme Q-10 30 MG capsule   • NARCAN 4 MG/0.1ML Liquid     No current facility-administered medications for this visit.       Allergies:   No Known Allergies    Social Hx:   Social History     Socioeconomic History   • Marital status:      Spouse name: Not on file   • Number of children: Not on file   • Years of education: Not on file   • Highest education level: Not on file   Occupational History   • Not on file   Tobacco Use   • Smoking status: Former Smoker     Packs/day: 0.50     Years: 30.00     Pack years: 15.00     Types: Cigarettes     Quit date: 2/1/2011     Years since quitting: 10.8   • Smokeless tobacco: Never Used   Substance and Sexual Activity   • Alcohol use: Yes     Alcohol/week: 0.5 oz     Types: 1 Standard drinks or equivalent per week     Comment: 4 a week    • Drug use: No   • Sexual activity: Not on file   Other Topics Concern   •  Service No   • Blood Transfusions No   • Caffeine Concern No   • Occupational Exposure No   • Hobby Hazards No   • Sleep Concern Yes   • Stress Concern No   • Weight Concern Yes   • Special Diet No   • Back Care No   • Exercise Yes   • Bike Helmet Yes   • Seat Belt Yes   • Self-Exams No   Social History Narrative   • Not on file     Social Determinants of Health     Financial Resource Strain:    • Difficulty of Paying Living Expenses: Not on file   Food Insecurity:    • Worried About Running Out of Food in the Last Year: Not on file   • Ran Out of Food  "in the Last Year: Not on file   Transportation Needs:    • Lack of Transportation (Medical): Not on file   • Lack of Transportation (Non-Medical): Not on file   Physical Activity:    • Days of Exercise per Week: Not on file   • Minutes of Exercise per Session: Not on file   Stress:    • Feeling of Stress : Not on file   Social Connections:    • Frequency of Communication with Friends and Family: Not on file   • Frequency of Social Gatherings with Friends and Family: Not on file   • Attends [employer] Services: Not on file   • Active Member of Clubs or Organizations: Not on file   • Attends Club or Organization Meetings: Not on file   • Marital Status: Not on file   Intimate Partner Violence:    • Fear of Current or Ex-Partner: Not on file   • Emotionally Abused: Not on file   • Physically Abused: Not on file   • Sexually Abused: Not on file   Housing Stability:    • Unable to Pay for Housing in the Last Year: Not on file   • Number of Places Lived in the Last Year: Not on file   • Unstable Housing in the Last Year: Not on file         EXAMINATION     Physical Exam:   Vitals: /70 (BP Location: Right arm, Patient Position: Sitting, BP Cuff Size: Adult)   Pulse (!) 111   Temp 36 °C (96.8 °F) (Temporal)   Ht 1.638 m (5' 4.5\")   Wt 100 kg (220 lb 10.9 oz)   SpO2 91%     Constitutional:   Body Habitus: Body mass index is 37.29 kg/m².  Cooperation: Fully cooperates with exam  Appearance: Well-groomed no disheveled in no acute distress    Respiratory-  breathing comfortable on room air, no audible wheezing  Cardiovascular- capillary refills less than 2 seconds. No lower extremity edema is noted.   Psychiatric- alert and oriented ×3. Normal affect.   Musculoskeletal  Trendelenburg gait     Knees  No warmth or erythema  Small effusion left knee medial lateral joint line tenderness.           MEDICAL DECISION MAKING    DATA    Labs:   Lab Results   Component Value Date/Time    SODIUM 138 08/26/2020 07:46 AM    " POTASSIUM 4.1 08/26/2020 07:46 AM    CHLORIDE 98 08/26/2020 07:46 AM    CO2 26 08/26/2020 07:46 AM    GLUCOSE 120 (H) 08/26/2020 07:46 AM    BUN 26 (H) 08/26/2020 07:46 AM    CREATININE 0.80 08/26/2020 07:46 AM    CREATININE 0.72 05/12/2011 07:40 AM    BUNCREATRAT 35 (H) 05/12/2011 07:40 AM    GLOMRATE >59 09/15/2009 09:00 AM        No results found for: PROTHROMBTM, INR     Lab Results   Component Value Date/Time    WBC 5.0 08/26/2020 07:46 AM    WBC 6.0 05/12/2011 07:40 AM    RBC 4.26 08/26/2020 07:46 AM    RBC 4.08 05/12/2011 07:40 AM    HEMOGLOBIN 12.2 08/26/2020 07:46 AM    HEMATOCRIT 38.4 08/26/2020 07:46 AM    MCV 90.1 08/26/2020 07:46 AM    MCV 91 05/12/2011 07:40 AM    MCH 28.6 08/26/2020 07:46 AM    MCH 28.9 05/12/2011 07:40 AM    MCHC 31.8 (L) 08/26/2020 07:46 AM    MPV 11.4 08/26/2020 07:46 AM    NEUTSPOLYS 51.10 08/26/2020 07:46 AM    LYMPHOCYTES 36.80 08/26/2020 07:46 AM    MONOCYTES 8.10 08/26/2020 07:46 AM    EOSINOPHILS 2.80 08/26/2020 07:46 AM    BASOPHILS 0.80 08/26/2020 07:46 AM    HYPOCHROMIA 1+ 05/13/2014 07:20 AM    ANISOCYTOSIS 2+ 09/28/2011 01:16 PM        Lab Results   Component Value Date/Time    HBA1C 6.2 (H) 08/26/2020 07:46 AM          Imaging: I personally reviewed following images  X-ray left knee October 5, 2021  There is tricompartmental osteoarthritis of the left knee with significant patellofemoral osteophytes    X-ray lumbar spine dated October 5, 2021  There is multilevel degenerative disc disease with minimal retrolisthesis at T12-L1 L1-2 and L3 with anterolisthesis at L4-5.  Facet arthropathy    X-ray of hips October 5, 2021  There is severe left hip osteoarthritis and moderate right hip osteoarthritis    I reviewed the following radiology reports  Xray left knee 10/05/2021  IMPRESSION:  1.  No acute fracture.   2.  Moderate osteoarthritis of the left knee joint is most pronounced in the medial and patellofemoral compartments.    Xray hips October 5, 2021  IMPRESSION:     1.   Severe osteoarthritis of the left hip joint, significant interval progression from the prior exam     2.  Moderate osteoarthritis of the right joint. Findings have progressed slightly from the prior exam    Xray lumbar 10/05/2021  IMPRESSION:     1.  Mild multilevel degenerative disc disease.  2.  Multilevel degenerative retrolisthesis at T12-L1, L1-L2, and L2-3  3.  Minimal anterolisthesis at L4-5.   4.  Mild facet arthropathy in the lower lumbar spine.          DIAGNOSIS   Visit Diagnoses     ICD-10-CM   1. Primary osteoarthritis of left knee  M17.12   2. Primary osteoarthritis of left hip  M16.12   3. Lumbar spondylosis  M47.816   4. DDD (degenerative disc disease), lumbar  M51.36         ASSESSMENT and PLAN:     Fadumo Adair 65 y.o. female seen for above    Fadumo was seen today for follow-up.    Diagnoses and all orders for this visit:    Primary osteoarthritis of left knee  -     dexamethasone (DECADRON) injection 4 mg    Primary osteoarthritis of left hip    Lumbar spondylosis    DDD (degenerative disc disease), lumbar           1. Discussed that she is considering having another opinion with Dr. Myrick regarding left hip replacement.  No left hip injection planned.  Encouraged her to continue on weight loss and home exercise program from PT to work on strengthening, even if pain does not improve.  2. Left knee intra-articular joint injection.  The risks benefits and alternatives to this procedure were discussed and the patient wishes to proceed with the procedure. Risks include but are not limited to damage to surrounding structures, infection, bleeding, worsening of pain which can be permanent, weakness which can be permanent. Benefits include pain relief, improved function. Alternatives includes not doing the procedure.    3. Advised to avoid taking more than 4000mg Tylenol a day from all sources  4. Discussed that she has been taking tylenol #3 for pain.   It is not unreasonable to have a small  script for a more powerful pain medication for upcoming plane travel.  Percocet 7.5/325 po q6h prn severe pain #20 would be reasonable.  I will defer this to JANIE Flores, but will let her know so that she can address this question at follow-up next.        Follow up: prn, depending on surgical plans    Thank you for allowing me to participate in the care of this patient. If you have any questions please not hesitate to contact me.    Please note that this dictation was created using voice recognition software. I have made every reasonable attempt to correct obvious errors but there may be errors of grammar and content that I may have overlooked prior to finalization of this note.      Meet Barrios MD  Interventional Spine and Sports Physiatry  Physical Medicine and Rehabilitation  [facility]

## 2021-11-30 NOTE — PROCEDURES
Date of Service: 11/30/2021    Physician/s: Meet Barrios MD    Pre-operative Diagnosis: left knee osteoarthritis(M17.12)    Post-operative Diagnosis: left knee osteoarthritis(M17.12)    Procedure: left Knee injection ultrasound-guided    Description of procedure:    The risks, benefits, and alternatives of the procedure were reviewed and discussed with the patient.  Written informed consent was freely obtained. A pre-procedural time-out was conducted by the physician verifying patient’s identity, procedure to be performed, procedure site and side, and allergy verification. Appropriate equipment was determined to be in place for the procedure.     No sedation was used for this procedure.     In the office suite the patient was placed in a sitting position, and the knee was prepped and draped in the usual sterile fashion. The ultrasound probe was placed over the  suprapatellar joint recess.   The target for injection was marked, and a 22g 3.5 inch needle was placed into skin and advanced under ultrasound guidance into the joint space. Following negative aspiration, approx 5mL of 1% lidocaine with 1cc of 4mg/ml of dexamethasone was then injected into the joint, and the needle was subsequently removed intact. The patient's knee was wiped with a 4x4 gauze, the area was cleansed with alcohol prep, and a bandaid was applied. There were no complications noted.     The patient noted some improvement in her usual knee pain prior to discharged and was discharged in good condition.    Meet Barrios MD  Physical Medicine and Rehabilitation  Interventional Spine and Sports Physiatry  [facility]

## 2021-12-02 ENCOUNTER — PHYSICAL THERAPY (OUTPATIENT)
Dept: PHYSICAL THERAPY | Facility: REHABILITATION | Age: 66
End: 2021-12-02
Attending: NURSE PRACTITIONER
Payer: COMMERCIAL

## 2021-12-02 DIAGNOSIS — G89.29 CHRONIC LOW BACK PAIN, UNSPECIFIED BACK PAIN LATERALITY, UNSPECIFIED WHETHER SCIATICA PRESENT: ICD-10-CM

## 2021-12-02 DIAGNOSIS — M25.552 LEFT HIP PAIN: ICD-10-CM

## 2021-12-02 DIAGNOSIS — M54.50 CHRONIC LOW BACK PAIN, UNSPECIFIED BACK PAIN LATERALITY, UNSPECIFIED WHETHER SCIATICA PRESENT: ICD-10-CM

## 2021-12-02 PROBLEM — M16.12 OSTEOARTHRITIS OF LEFT HIP: Status: ACTIVE | Noted: 2021-12-02

## 2021-12-02 PROCEDURE — 97110 THERAPEUTIC EXERCISES: CPT

## 2021-12-02 NOTE — OP THERAPY DAILY TREATMENT
Outpatient Physical Therapy  DAILY TREATMENT     [facility] Outpatient Physical Therapy [place]  [address]  [place] [address]  Phone:  [phone]  Fax:  [phone]    Date: 12/02/2021    Patient: Fadumo Adair  YOB: 1955  MRN: [id number]     Time Calculation    Start time: 0900  Stop time: 0945 Time Calculation (min): 45 minutes       Chief Complaint: L hip  Visit #: 6    SUBJECTIVE:  Had and Left knee injection last week with improvement in knee pain. Has orthopedic appt later today. Is hopeful to be able to undergo Left ELIJAH after the 11th of January.     OBJECTIVE:  Current objective measures: Left hip limited internal and external rotation, + scour          Therapeutic Exercises (CPT 60628):     1. Nustep, lvl 3 x 12min    2. SHuttle, 4c 2x 2min    3. Supine bolster glute bridge, x 20    4. Supine hip switch, 1min x 2    5. SLR x 10 each     6. Supine clamshell, purple x 40      Therapeutic Exercise Summary: HEP instruction/performance and development. Handout provided and exercises located below:  Access Code: [email or web address]  URL: [email or web address]  Date: 10/27/2021  Prepared by: Joao Neri    Exercises        Supine Hip Internal and External Rotation - 2 x daily - 20 reps      Supine Pelvic Tilt - 2 x daily - 20 reps      Supine Gluteal Sets - 2 x daily - 2 sets - 10 reps - 5 hold      Standing Hip Internal and External Rotation AAROM on Stool - 2 x daily - 3 sets - 12 reps    Therapeutic Treatments and Modalities:     1. Manual Therapy (CPT 78211), Grade III Long axis distraction, 3x 20, lateral and inferior mobs grade III 3x 30 each    Time-based treatments/modalities:    Physical Therapy Timed Treatment Charges  Manual therapy minutes (CPT 33749): 5 minutes  Therapeutic exercise minutes (CPT 54360): 40 minutes      Pain rating (1-10) before treatment:  0  Pain rating (1-10) after treatment:  0    ASSESSMENT:   Response to treatment:  Symptoms of stiffness continue.  Pain with activities is controlled but consistent outside of therapy. Orthopedic referral is appropriate given long term symptoms and impairment. F/u next week for maintained mobility and decreased pain.     PLAN/RECOMMENDATIONS:   Plan for treatment: therapy treatment to continue next visit.  Planned interventions for next visit: continue with current treatment.

## 2021-12-04 LAB
ALBUMIN SERPL-MCNC: 4.7 G/DL (ref 3.8–4.8)
ALBUMIN/GLOB SERPL: 2 {RATIO} (ref 1.2–2.2)
ALP SERPL-CCNC: 66 IU/L (ref 44–121)
ALT SERPL-CCNC: 13 IU/L (ref 0–32)
AST SERPL-CCNC: 16 IU/L (ref 0–40)
BASOPHILS # BLD AUTO: 0.1 X10E3/UL (ref 0–0.2)
BASOPHILS NFR BLD AUTO: 1 %
BILIRUB SERPL-MCNC: 0.3 MG/DL (ref 0–1.2)
BUN SERPL-MCNC: 22 MG/DL (ref 8–27)
BUN/CREAT SERPL: 24 (ref 12–28)
CALCIUM SERPL-MCNC: 9.8 MG/DL (ref 8.7–10.3)
CHLORIDE SERPL-SCNC: 100 MMOL/L (ref 96–106)
CHOLEST SERPL-MCNC: 177 MG/DL (ref 100–199)
CO2 SERPL-SCNC: 24 MMOL/L (ref 20–29)
CREAT SERPL-MCNC: 0.91 MG/DL (ref 0.57–1)
EOSINOPHIL # BLD AUTO: 0.2 X10E3/UL (ref 0–0.4)
EOSINOPHIL NFR BLD AUTO: 3 %
ERYTHROCYTE [DISTWIDTH] IN BLOOD BY AUTOMATED COUNT: 13.6 % (ref 11.7–15.4)
GLOBULIN SER CALC-MCNC: 2.4 G/DL (ref 1.5–4.5)
GLUCOSE SERPL-MCNC: 112 MG/DL (ref 65–99)
HBA1C MFR BLD: 6.5 % (ref 4.8–5.6)
HCT VFR BLD AUTO: 34.1 % (ref 34–46.6)
HDLC SERPL-MCNC: 48 MG/DL
HGB BLD-MCNC: 11.5 G/DL (ref 11.1–15.9)
IMM GRANULOCYTES # BLD AUTO: 0 X10E3/UL (ref 0–0.1)
IMM GRANULOCYTES NFR BLD AUTO: 0 %
IMMATURE CELLS  115398: NORMAL
LABORATORY COMMENT REPORT: ABNORMAL
LDLC SERPL CALC-MCNC: 102 MG/DL (ref 0–99)
LYMPHOCYTES # BLD AUTO: 2.6 X10E3/UL (ref 0.7–3.1)
LYMPHOCYTES NFR BLD AUTO: 37 %
MCH RBC QN AUTO: 29.7 PG (ref 26.6–33)
MCHC RBC AUTO-ENTMCNC: 33.7 G/DL (ref 31.5–35.7)
MCV RBC AUTO: 88 FL (ref 79–97)
MONOCYTES # BLD AUTO: 0.5 X10E3/UL (ref 0.1–0.9)
MONOCYTES NFR BLD AUTO: 7 %
MORPHOLOGY BLD-IMP: NORMAL
NEUTROPHILS # BLD AUTO: 3.6 X10E3/UL (ref 1.4–7)
NEUTROPHILS NFR BLD AUTO: 52 %
NRBC BLD AUTO-RTO: NORMAL %
PLATELET # BLD AUTO: 256 X10E3/UL (ref 150–450)
POTASSIUM SERPL-SCNC: 4.1 MMOL/L (ref 3.5–5.2)
PROT SERPL-MCNC: 7.1 G/DL (ref 6–8.5)
RBC # BLD AUTO: 3.87 X10E6/UL (ref 3.77–5.28)
SODIUM SERPL-SCNC: 140 MMOL/L (ref 134–144)
TRIGL SERPL-MCNC: 155 MG/DL (ref 0–149)
VLDLC SERPL CALC-MCNC: 27 MG/DL (ref 5–40)
WBC # BLD AUTO: 7 X10E3/UL (ref 3.4–10.8)

## 2021-12-08 ENCOUNTER — APPOINTMENT (OUTPATIENT)
Dept: PHYSICAL THERAPY | Facility: REHABILITATION | Age: 66
End: 2021-12-08
Attending: NURSE PRACTITIONER
Payer: COMMERCIAL

## 2021-12-09 ENCOUNTER — OFFICE VISIT (OUTPATIENT)
Dept: MEDICAL GROUP | Facility: LAB | Age: 66
End: 2021-12-09
Payer: COMMERCIAL

## 2021-12-09 VITALS
HEART RATE: 96 BPM | RESPIRATION RATE: 12 BRPM | OXYGEN SATURATION: 97 % | BODY MASS INDEX: 37.22 KG/M2 | WEIGHT: 218 LBS | HEIGHT: 64 IN | TEMPERATURE: 97.1 F | SYSTOLIC BLOOD PRESSURE: 112 MMHG | DIASTOLIC BLOOD PRESSURE: 66 MMHG

## 2021-12-09 DIAGNOSIS — G89.29 CHRONIC PAIN OF LEFT KNEE: ICD-10-CM

## 2021-12-09 DIAGNOSIS — M25.562 CHRONIC PAIN OF LEFT KNEE: ICD-10-CM

## 2021-12-09 DIAGNOSIS — R73.01 IMPAIRED FASTING GLUCOSE: ICD-10-CM

## 2021-12-09 DIAGNOSIS — M54.42 CHRONIC BILATERAL LOW BACK PAIN WITH LEFT-SIDED SCIATICA: ICD-10-CM

## 2021-12-09 DIAGNOSIS — G47.00 INSOMNIA, UNSPECIFIED TYPE: ICD-10-CM

## 2021-12-09 DIAGNOSIS — Z79.891 USE OF OPIATES FOR THERAPEUTIC PURPOSES: ICD-10-CM

## 2021-12-09 DIAGNOSIS — M16.12 PRIMARY OSTEOARTHRITIS OF LEFT HIP: ICD-10-CM

## 2021-12-09 DIAGNOSIS — G89.29 CHRONIC BILATERAL LOW BACK PAIN WITH LEFT-SIDED SCIATICA: ICD-10-CM

## 2021-12-09 PROCEDURE — 99214 OFFICE O/P EST MOD 30 MIN: CPT | Performed by: NURSE PRACTITIONER

## 2021-12-09 RX ORDER — METFORMIN HYDROCHLORIDE 500 MG/1
500 TABLET, EXTENDED RELEASE ORAL DAILY
Qty: 30 TABLET | Refills: 5 | Status: SHIPPED | OUTPATIENT
Start: 2021-12-09 | End: 2022-03-15 | Stop reason: SDUPTHER

## 2021-12-09 RX ORDER — ZOLPIDEM TARTRATE 10 MG/1
10 TABLET ORAL NIGHTLY PRN
Qty: 30 TABLET | Refills: 2 | Status: SHIPPED | OUTPATIENT
Start: 2021-12-09 | End: 2022-01-08

## 2021-12-09 RX ORDER — HYDROCODONE BITARTRATE AND ACETAMINOPHEN 10; 325 MG/1; MG/1
.5-1 TABLET ORAL 2 TIMES DAILY PRN
Qty: 60 TABLET | Refills: 0 | Status: SHIPPED | OUTPATIENT
Start: 2021-12-09 | End: 2022-01-08

## 2021-12-09 ASSESSMENT — FIBROSIS 4 INDEX: FIB4 SCORE: 1.09

## 2021-12-09 NOTE — ASSESSMENT & PLAN NOTE
Chronic issue.  Enjoying chips / wine / potatoes.  Skips breakfast.  carbs at lunch / dinner.    Limited exercise b/c hip is killing her - awaiting replacement.

## 2021-12-09 NOTE — PROGRESS NOTES
"CC  F/u     HPI  Fadumo is a 65-year-old established female here to follow-up on pain management.  She recently saw Dr. Myrick and is tentatively going to have a left hip replacement in January.  She had her left knee injected by physiatry a few weeks ago which helped.  She also has chronic low back pain that radiates down her left leg.  Currently taking 2-3 codine per day and has done so for years for chronic hip / knee / back pain.  She would like to increase the intensity of her pain medication for the next month until hip is replaced and to help her function while traveling in [employer] next week.  She denies any negative side effects of codeine.  She does take Ambien to sleep.  She periodically uses Xanax for anxiety.    Impaired fasting glucose  Chronic issue.  Unfortunately A1c moved up to 6.5%, she has been in the prediabetic range for the past several years.  Enjoying chips / wine / potatoes.  Skips breakfast.  carbs at lunch / dinner.  Limited exercise b/c hip is killing her - awaiting replacement.      Anxiety: Chronic issue.  Uses Xanax almost on a daily basis but not every day.  Has skipped a few days of Xanax and not gone through withdrawals.  Has done poorly on SNRI and SSRI therapy in the past.  Has had quite a traumatic few last years, losing her daughter to chronic illness and being present at mass shooting in [place].  Feels that she cannot live without her Xanax for now.    Past medical, surgical, family, and social history is reviewed and updated in Epic chart by me today.   Medications and allergies reviewed and updated in Epic chart by me today.     ROS:   As documented in history of present illness above    Exam:  /66 (BP Location: Right arm, Patient Position: Sitting, BP Cuff Size: Large adult)   Pulse 96   Temp 36.2 °C (97.1 °F)   Resp 12   Ht 1.626 m (5' 4\")   Wt 98.9 kg (218 lb)   SpO2 97%   Constitutional: Alert, no distress, plus 3 vital signs  Skin:  Warm, dry, no rashes " invisible areas  Eye: Equal, round and reactive, conjunctiva clear  Respiratory: Unlabored respiration  Cardiovascular: Normal rate   Psych: Alert, pleasant, well-groomed, normal affect    Assessment / Plan / Medical Decision makin. Impaired fasting glucose  metFORMIN ER (GLUCOPHAGE XR) 500 MG TABLET SR 24 HR   2. Primary osteoarthritis of left hip  HYDROcodone/acetaminophen (NORCO)  MG Tab   3. Chronic pain of left knee  HYDROcodone/acetaminophen (NORCO)  MG Tab   4. Chronic bilateral low back pain with left-sided sciatica  HYDROcodone/acetaminophen (NORCO)  MG Tab   5. Insomnia, unspecified type  zolpidem (AMBIEN) 10 MG Tab   6. Use of opiates for therapeutic purposes       In prescribing controlled substances to this patient, I certify that I have obtained and reviewed the medical history of Fadumo Adair. I have also made a good janet effort to obtain applicable records from other providers who have treated the patient and records did not demonstrate any increased risk of substance abuse that would prevent me from prescribing controlled substances.     I have conducted a physical exam and documented it. I have reviewed Ms. Adiar’s prescription history as maintained by the [place] Prescription Monitoring Program.     I have assessed the patient’s risk for abuse, dependency, and addiction using the validated Opioid Risk Tool available at https://www.mdcalc.com/xddwab-szav-uprc-ort-narcotic-abuse.     Given the above, I believe the benefits of controlled substance therapy outweigh the risks. The reasons for prescribing controlled substances include non-narcotic, oral analgesic alternatives have been inadequate for pain control. Accordingly, I have discussed the risk and benefits, treatment plan, and alternative therapies with the patient.     Trial of changing codeine to hydrocodone for the next month until she is able to have her hip replaced.  She will try one half of a  hydrocodone over the next 24 hours and email me tomorrow if it does not agree with her in terms of itching, increase sedation or any other side effects.  I did remove her from taking Xanax for the next month while taking hydrocodone due to increased risk of overdose and death when combining benzodiazepines and opiates.  We discussed trying to refrain from Xanax as much as possible in the long run.  She understands that she should not mix hydrocodone with alcohol or Ambien within 4 hours of each other.  Encouraged her to refrain from driving a car within 4 hours of hydrocodone.  Drug screen and contract are up-to-date    Started pt on metformin r/t A1c of 6.5.  Encouraged her to follow a very high vegetable, lean protein, low-carb diet.  We discussed avocado, lean protein sources, healthy fats, all vegetable intake and trying to avoid white carbohydrates.  I will see her back here in 3 months for an A1c check.  Discussed how to take Metformin as well as potential side effects.

## 2022-01-11 ENCOUNTER — HOSPITAL ENCOUNTER (OUTPATIENT)
Facility: MEDICAL CENTER | Age: 67
End: 2022-01-11
Attending: ANESTHESIOLOGY
Payer: COMMERCIAL

## 2022-01-11 LAB — COVID ORDER STATUS COVID19: NORMAL

## 2022-01-11 PROCEDURE — U0005 INFEC AGEN DETEC AMPLI PROBE: HCPCS

## 2022-01-11 PROCEDURE — U0003 INFECTIOUS AGENT DETECTION BY NUCLEIC ACID (DNA OR RNA); SEVERE ACUTE RESPIRATORY SYNDROME CORONAVIRUS 2 (SARS-COV-2) (CORONAVIRUS DISEASE [COVID-19]), AMPLIFIED PROBE TECHNIQUE, MAKING USE OF HIGH THROUGHPUT TECHNOLOGIES AS DESCRIBED BY CMS-2020-01-R: HCPCS

## 2022-01-12 LAB
SARS-COV-2 RNA RESP QL NAA+PROBE: NOTDETECTED
SPECIMEN SOURCE: NORMAL

## 2022-01-20 ENCOUNTER — TELEPHONE (OUTPATIENT)
Dept: PHYSICAL THERAPY | Facility: REHABILITATION | Age: 67
End: 2022-01-20

## 2022-01-20 NOTE — OP THERAPY DISCHARGE SUMMARY
Outpatient Physical Therapy  DISCHARGE SUMMARY NOTE      [facility] [place]  [address], [address]  [place] [address]  Phone:  [phone]  Fax:  [phone]    Date of Visit: 01/20/2022    Patient: Fadumo Adair  YOB: 1955  MRN: [id number]     Referring Provider: DORYS Pineda   Referring Diagnosis Chronic low back pain, unspecified back pain laterality, unspecified whether sciatica present [M54.50, G89.29];Chronic pain of left knee [M25.562, G89.29]         Your patient is being discharged from Physical Therapy with the following comments:   · Goals partially met    Comments:  Fadumo Adair has been discharged due to a lapse in care greater than 30 days. Thank you for the opportunity to assist you and your patient.    Limitations Remaining:  Limited hip ROM and pain    Recommendations:  F/u with PCP, continue with ELIJAH    Joao Neri, PT, DPT    Date: 1/20/2022

## 2022-02-18 ENCOUNTER — PATIENT MESSAGE (OUTPATIENT)
Dept: MEDICAL GROUP | Facility: LAB | Age: 67
End: 2022-02-18
Payer: COMMERCIAL

## 2022-02-18 ENCOUNTER — TELEPHONE (OUTPATIENT)
Dept: MEDICAL GROUP | Facility: LAB | Age: 67
End: 2022-02-18
Payer: COMMERCIAL

## 2022-02-18 DIAGNOSIS — R73.01 IMPAIRED FASTING GLUCOSE: ICD-10-CM

## 2022-02-18 NOTE — TELEPHONE ENCOUNTER
DOCUMENTATION OF PAR STATUS:    1. Name of Medication & Dose: Celecoxib 200mg     2. Name of Prescription Coverage Company & phone #: [employer]     3. Date Prior Auth Submitted: 2/18/2022    4. What information was given to obtain insurance decision? Diagnosis    5. Prior Auth Status? Approved 1/19/22-2/18/23    6. Patient Notified: no    Covermymeds KEY: [id number]

## 2022-03-09 ENCOUNTER — OFFICE VISIT (OUTPATIENT)
Dept: MEDICAL GROUP | Facility: LAB | Age: 67
End: 2022-03-09
Payer: COMMERCIAL

## 2022-03-09 VITALS
SYSTOLIC BLOOD PRESSURE: 112 MMHG | RESPIRATION RATE: 16 BRPM | HEIGHT: 64 IN | DIASTOLIC BLOOD PRESSURE: 64 MMHG | TEMPERATURE: 97.6 F | WEIGHT: 212 LBS | BODY MASS INDEX: 36.19 KG/M2 | HEART RATE: 94 BPM | OXYGEN SATURATION: 95 %

## 2022-03-09 DIAGNOSIS — R73.01 IMPAIRED FASTING GLUCOSE: ICD-10-CM

## 2022-03-09 DIAGNOSIS — Z96.642 STATUS POST LEFT HIP REPLACEMENT: ICD-10-CM

## 2022-03-09 PROBLEM — M16.12 OSTEOARTHRITIS OF LEFT HIP: Status: RESOLVED | Noted: 2021-12-02 | Resolved: 2022-03-09

## 2022-03-09 LAB
HBA1C MFR BLD: 5.5 % (ref 0–5.6)
INT CON NEG: NEGATIVE
INT CON POS: POSITIVE

## 2022-03-09 PROCEDURE — 99213 OFFICE O/P EST LOW 20 MIN: CPT | Performed by: NURSE PRACTITIONER

## 2022-03-09 PROCEDURE — 83036 HEMOGLOBIN GLYCOSYLATED A1C: CPT | Performed by: NURSE PRACTITIONER

## 2022-03-09 ASSESSMENT — FIBROSIS 4 INDEX: FIB4 SCORE: 1.109400392450458244

## 2022-03-09 ASSESSMENT — PATIENT HEALTH QUESTIONNAIRE - PHQ9: CLINICAL INTERPRETATION OF PHQ2 SCORE: 0

## 2022-03-09 NOTE — PROGRESS NOTES
"CC  f/u      HPI:  Fadumo is a 66-year-old established female here to follow-up on the development of type 2 diabetes, unfortunately A1c moved to 6.5 on December 3, 2021 after being around 6.2 for the past 5 years.   Since her last visit she has been very diligent about trying to get rid of diabetes and has been eating less / keeping an eye on avoiding white carbs.  Drinking mainly water / low carb alcohol / black coffee.    She has been through a hip replacement in the past 3 months and plans on entering PT / getting back into exercise.  .  Urinating less often and isn't as thirsty since diet has changed.    Has lost 10 lbs in the past 3 months.   Denies any GI side effects of Metformin.        Exam:  /64 (BP Location: Right arm, Patient Position: Sitting, BP Cuff Size: Large adult)   Pulse 94   Temp 36.4 °C (97.6 °F)   Resp 16   Ht 1.626 m (5' 4\")   Wt 96.2 kg (212 lb)   SpO2 95%   Gen. appears healthy in no distress   Skin appropriate for sex and age   Neck trachea is midline  Lungs unlabored breathing  Heart regular rate  Neuro gait and station normal   Psych appropriate, calm, interactive, well-groomed    A/P:  \"  1. Impaired fasting glucose  POCT  A1C   2. Status post left hip replacement     \"  Fortunately A1c down to 5.5% today!  She has done excellent with converting to a diabetic diet.  She plans on restarting exercise program.  Discussed the importance of continued diligence with a diabetic diet.  Recommend yearly eye appointments and good foot care.  Follow-up 6 months for an A1c in the office.  Did well with hip replacement.    Hcm: recommend scheduling mammogram.  "

## 2022-03-16 RX ORDER — METFORMIN HYDROCHLORIDE 500 MG/1
500 TABLET, EXTENDED RELEASE ORAL DAILY
Qty: 90 TABLET | Refills: 3 | Status: SHIPPED | OUTPATIENT
Start: 2022-03-16 | End: 2022-10-26 | Stop reason: SDUPTHER

## 2022-04-04 DIAGNOSIS — G47.9 SLEEP DISTURBANCE: ICD-10-CM

## 2022-04-04 RX ORDER — ZOLPIDEM TARTRATE 10 MG/1
TABLET ORAL
Qty: 30 TABLET | Refills: 1 | Status: SHIPPED | OUTPATIENT
Start: 2022-04-08 | End: 2022-06-08

## 2022-04-04 NOTE — TELEPHONE ENCOUNTER
----- Message from Fadumo Adair sent at 4/4/2022  8:55 AM PDT -----  Regarding: early refill  Hi  Im going on vacation leaving 4/9 and not returning until 4/21. I have refills for the zolpidem but I cant refill until 4/14. Please initiate a early refill with the insurance people and pharmacy.  Big Thanks  Happy Easter

## 2022-05-10 ENCOUNTER — PATIENT MESSAGE (OUTPATIENT)
Dept: MEDICAL GROUP | Facility: LAB | Age: 67
End: 2022-05-10
Payer: COMMERCIAL

## 2022-05-10 DIAGNOSIS — I10 ESSENTIAL HYPERTENSION: ICD-10-CM

## 2022-05-10 DIAGNOSIS — R60.0 BILATERAL EDEMA OF LOWER EXTREMITY: ICD-10-CM

## 2022-05-10 DIAGNOSIS — M25.551 BILATERAL HIP PAIN: ICD-10-CM

## 2022-05-10 DIAGNOSIS — M25.552 BILATERAL HIP PAIN: ICD-10-CM

## 2022-05-10 DIAGNOSIS — G89.29 CHRONIC BILATERAL LOW BACK PAIN WITH LEFT-SIDED SCIATICA: ICD-10-CM

## 2022-05-10 DIAGNOSIS — M54.42 CHRONIC BILATERAL LOW BACK PAIN WITH LEFT-SIDED SCIATICA: ICD-10-CM

## 2022-05-11 DIAGNOSIS — G89.29 CHRONIC BILATERAL LOW BACK PAIN WITH LEFT-SIDED SCIATICA: ICD-10-CM

## 2022-05-11 DIAGNOSIS — M25.552 BILATERAL HIP PAIN: ICD-10-CM

## 2022-05-11 DIAGNOSIS — M54.42 CHRONIC BILATERAL LOW BACK PAIN WITH LEFT-SIDED SCIATICA: ICD-10-CM

## 2022-05-11 DIAGNOSIS — M25.551 BILATERAL HIP PAIN: ICD-10-CM

## 2022-05-11 RX ORDER — HYDROCHLOROTHIAZIDE 25 MG/1
25 TABLET ORAL DAILY
Qty: 90 TABLET | Refills: 3 | Status: SHIPPED | OUTPATIENT
Start: 2022-05-11 | End: 2023-05-09 | Stop reason: SDUPTHER

## 2022-05-11 RX ORDER — HYDROCODONE BITARTRATE AND ACETAMINOPHEN 10; 325 MG/1; MG/1
1 TABLET ORAL
Qty: 20 TABLET | Refills: 0 | Status: SHIPPED | OUTPATIENT
Start: 2022-05-11 | End: 2022-06-11

## 2022-05-11 RX ORDER — HYDROCODONE BITARTRATE AND ACETAMINOPHEN 10; 325 MG/1; MG/1
TABLET ORAL
Qty: 20 TABLET | Refills: 0 | Status: SHIPPED | OUTPATIENT
Start: 2022-05-11 | End: 2022-05-11

## 2022-05-11 RX ORDER — HYDROCODONE BITARTRATE AND ACETAMINOPHEN 10; 325 MG/1; MG/1
TABLET ORAL
Qty: 20 TABLET | Refills: 0 | OUTPATIENT
Start: 2022-05-11

## 2022-05-12 ENCOUNTER — PHYSICAL THERAPY (OUTPATIENT)
Dept: PHYSICAL THERAPY | Facility: REHABILITATION | Age: 67
End: 2022-05-12
Attending: PHYSICIAN ASSISTANT
Payer: COMMERCIAL

## 2022-05-12 DIAGNOSIS — Z47.1 AFTERCARE FOLLOWING LEFT HIP JOINT REPLACEMENT SURGERY: ICD-10-CM

## 2022-05-12 DIAGNOSIS — Z96.642 AFTERCARE FOLLOWING LEFT HIP JOINT REPLACEMENT SURGERY: ICD-10-CM

## 2022-05-12 PROCEDURE — 97161 PT EVAL LOW COMPLEX 20 MIN: CPT

## 2022-05-12 PROCEDURE — 97110 THERAPEUTIC EXERCISES: CPT

## 2022-05-12 ASSESSMENT — ENCOUNTER SYMPTOMS
PAIN SCALE AT LOWEST: 0
QUALITY: ACHING
PAIN SCALE: 0
PAIN SCALE AT HIGHEST: 3
QUALITY: DISCOMFORT
QUALITY: DULL ACHE

## 2022-05-12 NOTE — OP THERAPY EVALUATION
Outpatient Physical Therapy  INITIAL EVALUATION    [facility] [place]  [address]  [place] [place] [address]  Phone:  [phone]  Fax:  [phone]    Date of Evaluation: 2022    Patient: Fadumo Adair  YOB: 1955  MRN: [id number]     Referring Provider: Norma Kendrick P.A.-C.  [address] [address]   Referring Diagnosis Aftercare following left hip joint replacement surgery [Z47.1, Z96.642]     Time Calculation  Start time: 735  Stop time: 0815 Time Calculation (min): 40 minutes       Chief Complaint: No chief complaint on file.    Visit Diagnoses     ICD-10-CM   1. Aftercare following left hip joint replacement surgery  Z47.1    Z96.642       Date of onset of impairment: 2022    Subjective:   History of Present Illness:     Mechanism of injury:  Fadumo Adair is a 66 y.o. female that presents to therapy with Left anterior hip pain. She states that symptoms came on with insidious onset. Her pain is located along the front of her left hip and can descende slightly done her anterior thigh. She also reports knee and back pain. Patient denies fevers/chills, numbness/weakness of lower extremities, bowel/bladder incontinence, saddle anesthesia.       Aggravating factors: lifting leg, transfers from bed to get out of bed, going up/ down stairs (knees), sitting or standing for too long(low back)  Relieving factors: rest, avoiding movements     ADL limitations: pain with motion of left hip.   Pain:     Current pain ratin    At best pain ratin    At worst pain rating:  3    Quality:  Dull ache, aching and discomfort      Past Medical History:   Diagnosis Date   • Anemia    • Arrhythmia     mild, pt not sure problem, does not see cardiologist    • Arthritis     hip, knees, back   • Cancer (HCC)     bladder   • Heart burn    • High cholesterol    • HTN    • Hyperlipemia    • Indigestion    • Low back pain 13    hips, and knees    • Urinary bladder disorder      Past Surgical History:   Procedure Laterality Date   • NV TOTAL HIP ARTHROPLASTY Left 2022    Procedure: LEFT ANTERIOR TOTAL HIP ARTHROPLASTY;  Surgeon: Sonido Myrick M.D.;  Location: [facility];  Service: Orthopedics   • TRANS URETHRAL RESECTION BLADDER  2018    Procedure: TRANS URETHRAL RESECTION BLADDER- TUMOR;  Surgeon: Sigifredo Montes M.D.;  Location: [facility];  Service: Urology   • CYSTOSCOPY  10/11/2011    Performed by MOLINA LOGAN at SURGERY [facility]   • VAGINAL PERINEAL EXAM REPAIR  10/11/2011    Performed by MOLINA LOGAN at SURGERY [facility]   • HYSTERECTOMY ROBOTIC  10/11/2011    Performed by MOLINA LOGAN at SURGERY [facility]   • PRIMARY C SECTION      x  2   • TONSILLECTOMY AND ADENOIDECTOMY      age 7     Social History     Tobacco Use   • Smoking status: Former Smoker     Packs/day: 0.50     Years: 30.00     Pack years: 15.00     Types: Cigarettes     Quit date: 2011     Years since quittin.2   • Smokeless tobacco: Never Used   Substance Use Topics   • Alcohol use: Yes     Alcohol/week: 2.4 oz     Types: 4 Standard drinks or equivalent per week     Comment: 4 a week      Family and Occupational History     Socioeconomic History   • Marital status:      Spouse name: Not on file   • Number of children: Not on file   • Years of education: Not on file   • Highest education level: Not on file   Occupational History   • Not on file       Objective     Lumbar Screen  Lumbar range of motion within normal limits.    Neurological Testing     Sensation     Hip   Left Hip   Intact: light touch    Right Hip   Intact: light touch    Active Range of Motion   Left Hip   Normal active range of motion    Right Hip   Normal active range of motion    Passive Range of Motion   Left Hip   Normal passive range of motion    Right Hip   Normal passive range of motion    Strength:      Left  Hip   Planes of Motion   Flexion: 4+ (with pain)  External rotation: 4  Internal rotation: 4+    Right Hip   Normal muscle strength    Tests     Left Hip   Hubert: Negative.         Therapeutic Exercises (CPT 93323):       Therapeutic Exercise Summary: HEP instruction/performance and development. Handout provided and exercises located below:  Access Code: [email or web address]  URL: [email or web address]  Date: 05/12/2022  Prepared by: Joao Neri    Exercises        Straight Leg Raise - 2 x daily - 10-12 reps      Sidelying Reverse Clamshell - 2 x daily - 15 reps      Bridge - 1 x daily - 2 sets - 10 reps      Time-based treatments/modalities:    Physical Therapy Timed Treatment Charges  Therapeutic exercise minutes (CPT 70876): 10 minutes      Assessment, Response and Plan:   Assessment details:  Fadumo Adair is a 66 y.o. female with signs and symptoms consistent with hip flexor tendinopathy following anterior hip replacement. She requires skilled physical therapy intervention to decrease pain, increase strength, increase functional mobility, improve ADL completion and establish a home exercise program.  Goals:   Short Term Goals:   1. Patient will be Independent with prescribed Home Exercise Program (HEP) and will be able to demonstrate exercises without cues for improved overall symptoms/activity tolerance.       Short term goal time span:  2-4 weeks      Long Term Goals:    2. Pt will improve hip flexion strength to 5/5 without pain for improved ability to transfer in and out of car or bed without pain.   3. Pt will improve LEFS score to greater than 58/80 indicative of improved function and reduced perceived disability.  Long term goal time span:  2-4 weeks    Plan:   Planned therapy interventions:  Therapeutic Exercise (CPT 28480), Manual Therapy (CPT 45582), Neuromuscular Re-education (CPT 66084) and E Stim Unattended (CPT 51513)  Frequency: 1-2x/week.  Duration in weeks:  4  Discussed with:   Patient      Functional Assessment Used  PT Functional Assessment Tool Used: LEFS  PT Functional Assessment Score: 49/80     Referring provider co-signature:  I have reviewed this plan of care and my co-signature certifies the need for services.    Certification Period: 05/12/2022 to  06/09/22    Physician Signature: ________________________________ Date: ______________

## 2022-05-16 ENCOUNTER — PHYSICAL THERAPY (OUTPATIENT)
Dept: PHYSICAL THERAPY | Facility: REHABILITATION | Age: 67
End: 2022-05-16
Attending: PHYSICIAN ASSISTANT
Payer: COMMERCIAL

## 2022-05-16 DIAGNOSIS — Z47.1 AFTERCARE FOLLOWING LEFT HIP JOINT REPLACEMENT SURGERY: ICD-10-CM

## 2022-05-16 DIAGNOSIS — Z96.642 AFTERCARE FOLLOWING LEFT HIP JOINT REPLACEMENT SURGERY: ICD-10-CM

## 2022-05-16 DIAGNOSIS — M25.552 LEFT HIP PAIN: ICD-10-CM

## 2022-05-16 PROCEDURE — 97110 THERAPEUTIC EXERCISES: CPT

## 2022-05-16 NOTE — OP THERAPY DAILY TREATMENT
Outpatient Physical Therapy  DAILY TREATMENT     [facility] [place]  [address]  [place] [address]  Phone:  [phone]  Fax:  [phone]    Date: 05/16/2022    Patient: Fadumo Adair  YOB: 1955  MRN: [id number]     Time Calculation    Start time: 1030  Stop time: 1110 Time Calculation (min): 40 minutes         Chief Complaint: L hip   Visit #: 2    SUBJECTIVE:  Notes no problem with exercises at home and doing well overall with them.     OBJECTIVE:  Current objective measures: L  hip flexion MMT 4/5,           Therapeutic Exercises (CPT 91125):     1. Bike , lvl 6.5 x 10min    2. Shuttle, 5c x 30, 6c x 30, SL 4c x 20    3. Supine ball rolls, 1min abominal bracing     4. SLR, x10    5. Ball bridge, x20    6. Step up, 6in x 30 each    7. X band walk, pink x 40    8. Squat to chair, x 15      Therapeutic Exercise Summary: HEP instruction/performance and development. Handout provided and exercises located below:  Access Code: [email or web address]  URL: [email or web address]  Date: 05/12/2022  Prepared by: Joao Neri    Exercises        Straight Leg Raise - 2 x daily - 10-12 reps      Sidelying Reverse Clamshell - 2 x daily - 15 reps      Bridge - 1 x daily - 2 sets - 10 reps      Time-based treatments/modalities:    Physical Therapy Timed Treatment Charges  Therapeutic exercise minutes (CPT 96154): 40 minutes      Pain rating (1-10) before treatment:  0  Pain rating (1-10) after treatment:  0    ASSESSMENT:   Response to treatment: Tolerated all exercises very well. Hip flexion strength to continue to progress as able. F/u Thursday     PLAN/RECOMMENDATIONS:   Plan for treatment: therapy treatment to continue next visit.  Planned interventions for next visit: continue with current treatment.

## 2022-05-19 ENCOUNTER — PHYSICAL THERAPY (OUTPATIENT)
Dept: PHYSICAL THERAPY | Facility: REHABILITATION | Age: 67
End: 2022-05-19
Attending: PHYSICIAN ASSISTANT
Payer: COMMERCIAL

## 2022-05-19 DIAGNOSIS — M25.552 LEFT HIP PAIN: ICD-10-CM

## 2022-05-19 DIAGNOSIS — Z96.642 AFTERCARE FOLLOWING LEFT HIP JOINT REPLACEMENT SURGERY: ICD-10-CM

## 2022-05-19 DIAGNOSIS — Z47.1 AFTERCARE FOLLOWING LEFT HIP JOINT REPLACEMENT SURGERY: ICD-10-CM

## 2022-05-19 PROCEDURE — 97110 THERAPEUTIC EXERCISES: CPT

## 2022-05-19 NOTE — OP THERAPY DAILY TREATMENT
Outpatient Physical Therapy  DAILY TREATMENT     [facility] [place]  [address]  [place] [address]  Phone:  [phone]  Fax:  [phone]    Date: 05/19/2022    Patient: Fadumo Adair  YOB: 1955  MRN: [id number]     Time Calculation    Start time: 1030  Stop time: 1115 Time Calculation (min): 45 minutes         Chief Complaint: L hip   Visit #: 3    SUBJECTIVE:  Reports soreness/pain is about the same. Notes no problem with exercises at home and doing well overall with them.      OBJECTIVE:  Current objective measures: L  hip flexion MMT 4/5,           Therapeutic Exercises (CPT 69195):     1. Bike , lvl 8 x 10min    2. Shuttle, DL 5c x 30, SL 4c x 15, SL 5c x 10     3. Supine ball rolls, 1min abominal bracing     4. SLR, x10    5. Ball bridge, x20    6. Step up, 6in 2x 15 each    7. X band walk, pink x 40    8. Squat to chair, x 15    9. Supine , lumbar twist 1min    10. Supine march, lvl 1 x 20, lvl 2 x 20      Therapeutic Exercise Summary: HEP instruction/performance and development. Handout provided and exercises located below:  Access Code: [email or web address]  URL: [email or web address]  Date: 05/12/2022  Prepared by: Joao Neri    Exercises        Straight Leg Raise - 2 x daily - 10-12 reps      Sidelying Reverse Clamshell - 2 x daily - 15 reps      Bridge - 1 x daily - 2 sets - 10 reps    Clamshell banded 1 set to fatigue, green band  Time-based treatments/modalities:    Physical Therapy Timed Treatment Charges  Therapeutic exercise minutes (CPT 65063): 45 minutes      Pain rating (1-10) before treatment:  0  Pain rating (1-10) after treatment:  0    ASSESSMENT:   Response to treatment: Tolerated all exercises very well. Hip flexion strength to continue to progress as able. F/u tuesday     PLAN/RECOMMENDATIONS:   Plan for treatment: therapy treatment to continue next visit.  Planned interventions for next visit: continue with current treatment.

## 2022-05-24 ENCOUNTER — PHYSICAL THERAPY (OUTPATIENT)
Dept: PHYSICAL THERAPY | Facility: REHABILITATION | Age: 67
End: 2022-05-24
Attending: PHYSICIAN ASSISTANT
Payer: COMMERCIAL

## 2022-05-24 DIAGNOSIS — Z47.1 AFTERCARE FOLLOWING LEFT HIP JOINT REPLACEMENT SURGERY: ICD-10-CM

## 2022-05-24 DIAGNOSIS — Z96.642 AFTERCARE FOLLOWING LEFT HIP JOINT REPLACEMENT SURGERY: ICD-10-CM

## 2022-05-24 PROCEDURE — 97110 THERAPEUTIC EXERCISES: CPT

## 2022-05-24 NOTE — OP THERAPY DAILY TREATMENT
Outpatient Physical Therapy  DAILY TREATMENT     [facility] [place]  [address]  [place] [address]  Phone:  [phone]  Fax:  [phone]    Date: 05/24/2022    Patient: Fadumo Adair  YOB: 1955  MRN: [id number]     Time Calculation    Start time: 1030  Stop time: 1110 Time Calculation (min): 40 minutes         Chief Complaint: L hip   Visit #: 4    SUBJECTIVE:  Reports soreness/pain is about the same. Notes no problem with exercises at home and doing well overall with them.      OBJECTIVE:  Current objective measures: L  hip flexion MMT 4/5,           Therapeutic Exercises (CPT 29681):     1. Bike , lvl 8.5 x 10min    2. Shuttle, DL 5c x 30, SL 4c x 15, SL 5c x 10     3. Supine ball rolls, 1minx2 abominal bracing     4. SLR, x15 each    5. Ball bridge, 2x20    6. Step up, 8in 2x 15 each    7. X band walk, pink x 40    8. Squat to chair, x 15    9. Supine , lumbar twist 1min    10. Supine march, lvl 1 x 20, lvl 2 x 20      Therapeutic Exercise Summary: HEP instruction/performance and development. Handout provided and exercises located below:  Access Code: [email or web address]  URL: [email or web address]  Date: 05/12/2022  Prepared by: Joao Neri    Exercises        Straight Leg Raise - 2 x daily - 10-12 reps      Sidelying Reverse Clamshell - 2 x daily - 15 reps      Bridge - 1 x daily - 2 sets - 10 reps    Clamshell banded 1 set to fatigue, green band  Time-based treatments/modalities:    Physical Therapy Timed Treatment Charges  Therapeutic exercise minutes (CPT 45131): 40 minutes      Pain rating (1-10) before treatment:  0  Pain rating (1-10) after treatment:  0    ASSESSMENT:   Response to treatment: Tolerated all exercises very well. Gait improved as well. Pt to likely promote to independence at next session. F/u in one week.     PLAN/RECOMMENDATIONS:   Plan for treatment: therapy treatment to continue next visit.  Planned interventions for next visit:  continue with current treatment.

## 2022-05-31 ENCOUNTER — PHYSICAL THERAPY (OUTPATIENT)
Dept: PHYSICAL THERAPY | Facility: REHABILITATION | Age: 67
End: 2022-05-31
Attending: PHYSICIAN ASSISTANT
Payer: COMMERCIAL

## 2022-05-31 DIAGNOSIS — Z47.1 AFTERCARE FOLLOWING LEFT HIP JOINT REPLACEMENT SURGERY: ICD-10-CM

## 2022-05-31 DIAGNOSIS — Z96.642 AFTERCARE FOLLOWING LEFT HIP JOINT REPLACEMENT SURGERY: ICD-10-CM

## 2022-05-31 PROCEDURE — 97110 THERAPEUTIC EXERCISES: CPT

## 2022-05-31 NOTE — OP THERAPY DAILY TREATMENT
Outpatient Physical Therapy  DAILY TREATMENT     [facility] [place]  [address]  [place] [facility] [address]  Phone:  [phone]  Fax:  [phone]    Date: 05/31/2022    Patient: Fadumo Adair  YOB: 1955  MRN: [id number]     Time Calculation    Start time: 1030  Stop time: 1100 Time Calculation (min): 30 minutes         Chief Complaint: L hip   Visit #: 5    SUBJECTIVE:   Notes no problem with exercises at home and doing well overall with them. Notes no issues with day to day tasks. Want to make today her last visit.      OBJECTIVE:  Current objective measures: L  hip flexion MMT 4+/5,   PT Functional Assessment Tool Used: LEFS  PT Functional Assessment Score: 49/80       Therapeutic Exercises (CPT 01316):     1. Bike , lvl 8.5 x 10min    2. Shuttle, , SL 5c x15, 6c x 15      Therapeutic Exercise Summary: HEP instruction/performance and development. Handout provided and exercises located below:  Access Code: [email or web address]  URL: [email or web address]  Date: 05/31/2022  Prepared by: Joao Neri    Exercises        Straight Leg Raise - 2 x daily - 10-12 reps      Bridge with Heels on Swiss Ball - 1 x daily - 3 x weekly - 2 sets - 10 reps      Clam with Resistance - 1 x daily - 3 x weekly - 2 sets - 10 reps      X Band Walk - 1 x daily - 3 x weekly - 2 sets - 10 reps      Time-based treatments/modalities:    Physical Therapy Timed Treatment Charges  Therapeutic exercise minutes (CPT 55886): 30 minutes      Pain rating (1-10) before treatment:  0  Pain rating (1-10) after treatment:  0    ASSESSMENT:   Response to treatment: Tolerated all exercises very well. Pt to be discharged due to patient preferences. Goals partially met.     PLAN/RECOMMENDATIONS:   Plan for treatment: discharge patient due to patient/family choice.

## 2022-05-31 NOTE — OP THERAPY DISCHARGE SUMMARY
Outpatient Physical Therapy  DISCHARGE SUMMARY NOTE      [facility] [place]  [address]  [place] [address]  Phone:  [phone]  Fax:  [phone]    Date of Visit: 05/31/2022    Patient: Fadumo Adair  YOB: 1955  MRN: [id number]     Referring Provider: Norma Kendrick P.A.-C.  [address] [address]   Referring Diagnosis Aftercare following joint replacement surgery [Z47.1];Presence of left artificial hip joint [Z96.642]         Functional Assessment Used  PT Functional Assessment Tool Used: LEFS  PT Functional Assessment Score: 49/80     Your patient is being discharged from Physical Therapy with the following comments:   · Goals partially met  · Pt wishes to be discharged    Comments:  Fadumo Adair has completed 5 physical therapy sessions on her current prescription. She has consistent function, decreased pain, improved strength, consistent ROM, consistent gait and she continues to progress with her home exercise program. Recommend to discharge patient to full independent home exercise program at this time. Thank you for the opportunity to assist you and your patient.         Limitations Remaining:  Hip flexion weakness    Recommendations:  Continue HEP, f/u with ortho as needed    Joao Neri, PT, DPT    Date: 5/31/2022

## 2022-06-02 ENCOUNTER — APPOINTMENT (OUTPATIENT)
Dept: PHYSICAL THERAPY | Facility: REHABILITATION | Age: 67
End: 2022-06-02
Attending: PHYSICIAN ASSISTANT
Payer: COMMERCIAL

## 2022-06-06 ENCOUNTER — APPOINTMENT (OUTPATIENT)
Dept: PHYSICAL THERAPY | Facility: REHABILITATION | Age: 67
End: 2022-06-06
Attending: PHYSICIAN ASSISTANT
Payer: COMMERCIAL

## 2022-06-08 ENCOUNTER — TELEPHONE (OUTPATIENT)
Dept: MEDICAL GROUP | Facility: LAB | Age: 67
End: 2022-06-08
Payer: COMMERCIAL

## 2022-06-08 ENCOUNTER — APPOINTMENT (OUTPATIENT)
Dept: PHYSICAL THERAPY | Facility: REHABILITATION | Age: 67
End: 2022-06-08
Attending: PHYSICIAN ASSISTANT
Payer: COMMERCIAL

## 2022-06-08 NOTE — TELEPHONE ENCOUNTER
MEDICATION PRIOR AUTHORIZATION NEEDED:    1. Name of Medication: Celecoxib    2. Requested By (Name of [facility]): [facility]     3. Is insurance on file current? yes    4. What is the name & phone number of the 3rd party payor?  [id number]

## 2022-06-13 ENCOUNTER — PATIENT MESSAGE (OUTPATIENT)
Dept: HEALTH INFORMATION MANAGEMENT | Facility: OTHER | Age: 67
End: 2022-06-13

## 2022-06-13 ENCOUNTER — APPOINTMENT (OUTPATIENT)
Dept: PHYSICAL THERAPY | Facility: REHABILITATION | Age: 67
End: 2022-06-13
Attending: PHYSICIAN ASSISTANT
Payer: COMMERCIAL

## 2022-06-20 ENCOUNTER — OFFICE VISIT (OUTPATIENT)
Dept: MEDICAL GROUP | Facility: LAB | Age: 67
End: 2022-06-20
Payer: COMMERCIAL

## 2022-06-20 VITALS
OXYGEN SATURATION: 95 % | RESPIRATION RATE: 12 BRPM | BODY MASS INDEX: 37.39 KG/M2 | DIASTOLIC BLOOD PRESSURE: 78 MMHG | SYSTOLIC BLOOD PRESSURE: 136 MMHG | TEMPERATURE: 97.3 F | HEART RATE: 82 BPM | HEIGHT: 64 IN | WEIGHT: 219 LBS

## 2022-06-20 DIAGNOSIS — G89.29 CHRONIC BILATERAL LOW BACK PAIN WITHOUT SCIATICA: ICD-10-CM

## 2022-06-20 DIAGNOSIS — E78.49 OTHER HYPERLIPIDEMIA: ICD-10-CM

## 2022-06-20 DIAGNOSIS — R73.01 IMPAIRED FASTING GLUCOSE: ICD-10-CM

## 2022-06-20 DIAGNOSIS — I10 ESSENTIAL HYPERTENSION: ICD-10-CM

## 2022-06-20 DIAGNOSIS — F41.9 ANXIETY: ICD-10-CM

## 2022-06-20 DIAGNOSIS — Z96.642 STATUS POST LEFT HIP REPLACEMENT: ICD-10-CM

## 2022-06-20 DIAGNOSIS — E66.9 OBESITY (BMI 35.0-39.9 WITHOUT COMORBIDITY): ICD-10-CM

## 2022-06-20 DIAGNOSIS — M54.50 CHRONIC BILATERAL LOW BACK PAIN WITHOUT SCIATICA: ICD-10-CM

## 2022-06-20 DIAGNOSIS — M15.9 PRIMARY OSTEOARTHRITIS INVOLVING MULTIPLE JOINTS: ICD-10-CM

## 2022-06-20 PROBLEM — M25.552 BILATERAL HIP PAIN: Status: RESOLVED | Noted: 2021-09-08 | Resolved: 2022-06-20

## 2022-06-20 PROBLEM — M25.551 BILATERAL HIP PAIN: Status: RESOLVED | Noted: 2021-09-08 | Resolved: 2022-06-20

## 2022-06-20 PROCEDURE — 99214 OFFICE O/P EST MOD 30 MIN: CPT | Performed by: NURSE PRACTITIONER

## 2022-06-20 RX ORDER — LISINOPRIL 20 MG/1
TABLET ORAL
Qty: 90 TABLET | Refills: 3 | Status: SHIPPED | OUTPATIENT
Start: 2022-06-20 | End: 2023-05-09 | Stop reason: SDUPTHER

## 2022-06-20 RX ORDER — ATORVASTATIN CALCIUM 40 MG/1
TABLET, FILM COATED ORAL
Qty: 90 TABLET | Refills: 3 | Status: SHIPPED | OUTPATIENT
Start: 2022-06-20 | End: 2023-05-09 | Stop reason: SDUPTHER

## 2022-06-20 RX ORDER — HYDROXYZINE HYDROCHLORIDE 25 MG/1
12.5-25 TABLET, FILM COATED ORAL 2 TIMES DAILY PRN
Qty: 30 TABLET | Refills: 2 | Status: SHIPPED | OUTPATIENT
Start: 2022-06-20 | End: 2022-10-26 | Stop reason: SDUPTHER

## 2022-06-20 RX ORDER — LANCETS 30 GAUGE
EACH MISCELLANEOUS
Qty: 100 EACH | Refills: 3 | Status: SHIPPED | OUTPATIENT
Start: 2022-06-20

## 2022-06-20 ASSESSMENT — FIBROSIS 4 INDEX: FIB4 SCORE: 1.109400392450458244

## 2022-06-20 NOTE — PROGRESS NOTES
"CC  f/u      HPI:  Fadumo is a 66-year-old established female here to follow-up on chronic pain.  Had a hip replacement done January 2022 - completed PT end of May.    Still having a lot of pain in low back, right hip and hands.  Negative autoimmune arthritis work-up twice, once in 2011 and again in 2017.  Ok with going back to tylenol with codeine for pain control from hydrocodone.  Taking celebrex 1-2 per day.    Hypertension: Chronic issue.  Blood pressure has been well controlled with lisinopril.  Also continues on atorvastatin for hyperlipidemia.  Compliant with metformin for prediabetes.  She is interested in a glucometer feedback after eating certain carbohydrates. Last a1c 5.5% just 3 months ago.  Has not reentered a regular exercise program.    Exam:  /78 (BP Location: Right arm, Patient Position: Sitting, BP Cuff Size: Large adult)   Pulse 82   Temp 36.3 °C (97.3 °F)   Resp 12   Ht 1.626 m (5' 4\")   Wt 99.3 kg (219 lb)   SpO2 95%   Gen. appears healthy in no distress   Skin appropriate for sex and age   Neck trachea is midline  Lungs unlabored breathing  Heart regular rate  Neuro gait and station normal   Psych appropriate, calm, interactive, well-groomed      A/P:  \"  1. Essential hypertension  Comp Metabolic Panel    CBC WITH DIFFERENTIAL    Lipid Profile    HEMOGLOBIN A1C    TSH    lisinopril (PRINIVIL) 20 MG Tab   2. Other hyperlipidemia  Comp Metabolic Panel    CBC WITH DIFFERENTIAL    Lipid Profile    HEMOGLOBIN A1C    TSH    atorvastatin (LIPITOR) 40 MG Tab   3. Impaired fasting glucose  Comp Metabolic Panel    CBC WITH DIFFERENTIAL    Lipid Profile    HEMOGLOBIN A1C    TSH    Lancets    Blood Glucose Test Strips    Blood Glucose Monitoring Suppl Device   4. Chronic bilateral low back pain without sciatica  MR-LUMBAR SPINE-W/O    Referral to Pain Clinic   5. Primary osteoarthritis involving multiple joints  acetaminophen-codeine #3 (TYLENOL #3) 300-30 MG Tab   6. Anxiety  hydrOXYzine HCl " "(ATARAX) 25 MG Tab   7. Obesity (BMI 35.0-39.9 without comorbidity) ([facility])     8. Status post left hip replacement     \"  Fortunately able to move away from hydrocodone and back towards Tylenol 3 as needed for right hip pain, bilateral hand osteoarthritis, low back discomfort.  She requested a referral to a different physiatrist and was referred to Dr. Gamble for her opinion regarding the patient's spine.  Ordered an MRI as x-ray did show degenerative disc disease and she is interested in further interventions for her low back pain.  Also encouraged her to follow-up with her left hip replacement surgeon in regards to her right hip.  Reviewed autoimmune arthritis lab test from 2011 in 2017, both of which were negative.  Encouraged her to restart a regular physical exercise program.  She has gained about 7 pounds and we discussed portion control, exercise and weight loss.  She requested updated labs and these were given to her.  Also trial of a glucometer once in the morning fasting and about 45 to 60 minutes after eating -which we mutually discussed we will give her feedback in regards to foods that cause a jump in her blood glucose.  Discussed a diabetic diet.  She does have a follow-up with me in September.  She did request medication for anxiety on an as-needed basis and we refrain from benzodiazepines because of opiate use.  She was given hydroxyzine 12.5 to 25 mg to take up to twice daily as needed for anxiety.  She understands that she should not drive a car or drink alcohol within 8 hours of Ambien or Tylenol 3.  Encouraged her to avoid driving a car within 4 hours of hydroxyzine.    Side effects of all medications prescribed today were discussed with the patient including how to take the medications and proper dosage. Discussed repercussions of not taking the medications as prescribed. Instructed to call the office should she have any negative side effects or problems with the medications.  "

## 2022-07-13 ENCOUNTER — APPOINTMENT (OUTPATIENT)
Dept: RADIOLOGY | Facility: MEDICAL CENTER | Age: 67
End: 2022-07-13
Attending: NURSE PRACTITIONER
Payer: OTHER GOVERNMENT

## 2022-08-10 ENCOUNTER — TELEPHONE (OUTPATIENT)
Dept: MEDICAL GROUP | Facility: LAB | Age: 67
End: 2022-08-10
Payer: COMMERCIAL

## 2022-08-10 NOTE — TELEPHONE ENCOUNTER
MEDICATION PRIOR AUTHORIZATION NEEDED:    1. Name of Medication: zolpidem 10mg    2. Requested By (Name of Pharmacy): [facility]     3. Is insurance on file current? yes    4. What is the name & phone number of the 3rd [facility]? [facility]    Key: [id number]

## 2022-10-18 ENCOUNTER — APPOINTMENT (OUTPATIENT)
Dept: RADIOLOGY | Facility: MEDICAL CENTER | Age: 67
End: 2022-10-18
Attending: NURSE PRACTITIONER
Payer: COMMERCIAL

## 2022-10-18 DIAGNOSIS — Z12.31 VISIT FOR SCREENING MAMMOGRAM: ICD-10-CM

## 2022-10-19 ENCOUNTER — HOSPITAL ENCOUNTER (OUTPATIENT)
Dept: RADIOLOGY | Facility: MEDICAL CENTER | Age: 67
End: 2022-10-19
Attending: NURSE PRACTITIONER
Payer: COMMERCIAL

## 2022-10-19 DIAGNOSIS — Z12.31 VISIT FOR SCREENING MAMMOGRAM: ICD-10-CM

## 2022-10-19 PROCEDURE — 77063 BREAST TOMOSYNTHESIS BI: CPT

## 2022-10-25 LAB
ALBUMIN SERPL-MCNC: 5 G/DL (ref 3.8–4.8)
ALBUMIN/GLOB SERPL: 2 {RATIO} (ref 1.2–2.2)
ALP SERPL-CCNC: 73 IU/L (ref 44–121)
ALT SERPL-CCNC: 17 IU/L (ref 0–32)
AST SERPL-CCNC: 18 IU/L (ref 0–40)
BASOPHILS # BLD AUTO: 0 X10E3/UL (ref 0–0.2)
BASOPHILS NFR BLD AUTO: 1 %
BILIRUB SERPL-MCNC: 0.6 MG/DL (ref 0–1.2)
BUN SERPL-MCNC: 22 MG/DL (ref 8–27)
BUN/CREAT SERPL: 27 (ref 12–28)
CALCIUM SERPL-MCNC: 9.7 MG/DL (ref 8.7–10.3)
CHLORIDE SERPL-SCNC: 98 MMOL/L (ref 96–106)
CHOLEST SERPL-MCNC: 200 MG/DL (ref 100–199)
CO2 SERPL-SCNC: 26 MMOL/L (ref 20–29)
CREAT SERPL-MCNC: 0.82 MG/DL (ref 0.57–1)
EGFRCR SERPLBLD CKD-EPI 2021: 80 ML/MIN/1.73
EOSINOPHIL # BLD AUTO: 0.2 X10E3/UL (ref 0–0.4)
EOSINOPHIL NFR BLD AUTO: 3 %
ERYTHROCYTE [DISTWIDTH] IN BLOOD BY AUTOMATED COUNT: 13.8 % (ref 11.7–15.4)
GLOBULIN SER CALC-MCNC: 2.5 G/DL (ref 1.5–4.5)
GLUCOSE SERPL-MCNC: 126 MG/DL (ref 70–99)
HBA1C MFR BLD: 6.4 % (ref 4.8–5.6)
HCT VFR BLD AUTO: 37.2 % (ref 34–46.6)
HDLC SERPL-MCNC: 49 MG/DL
HGB BLD-MCNC: 12.4 G/DL (ref 11.1–15.9)
IMM GRANULOCYTES # BLD AUTO: 0 X10E3/UL (ref 0–0.1)
IMM GRANULOCYTES NFR BLD AUTO: 0 %
IMMATURE CELLS  115398: NORMAL
LABORATORY COMMENT REPORT: ABNORMAL
LDLC SERPL CALC-MCNC: 108 MG/DL (ref 0–99)
LYMPHOCYTES # BLD AUTO: 2.1 X10E3/UL (ref 0.7–3.1)
LYMPHOCYTES NFR BLD AUTO: 35 %
MCH RBC QN AUTO: 29 PG (ref 26.6–33)
MCHC RBC AUTO-ENTMCNC: 33.3 G/DL (ref 31.5–35.7)
MCV RBC AUTO: 87 FL (ref 79–97)
MONOCYTES # BLD AUTO: 0.4 X10E3/UL (ref 0.1–0.9)
MONOCYTES NFR BLD AUTO: 7 %
MORPHOLOGY BLD-IMP: NORMAL
NEUTROPHILS # BLD AUTO: 3.3 X10E3/UL (ref 1.4–7)
NEUTROPHILS NFR BLD AUTO: 54 %
NRBC BLD AUTO-RTO: NORMAL %
PLATELET # BLD AUTO: 229 X10E3/UL (ref 150–450)
POTASSIUM SERPL-SCNC: 4.2 MMOL/L (ref 3.5–5.2)
PROT SERPL-MCNC: 7.5 G/DL (ref 6–8.5)
RBC # BLD AUTO: 4.28 X10E6/UL (ref 3.77–5.28)
SODIUM SERPL-SCNC: 137 MMOL/L (ref 134–144)
TRIGL SERPL-MCNC: 252 MG/DL (ref 0–149)
TSH SERPL DL<=0.005 MIU/L-ACNC: 1.2 UIU/ML (ref 0.45–4.5)
VLDLC SERPL CALC-MCNC: 43 MG/DL (ref 5–40)
WBC # BLD AUTO: 6.1 X10E3/UL (ref 3.4–10.8)

## 2022-10-26 ENCOUNTER — OFFICE VISIT (OUTPATIENT)
Dept: MEDICAL GROUP | Facility: LAB | Age: 67
End: 2022-10-26
Payer: COMMERCIAL

## 2022-10-26 VITALS
OXYGEN SATURATION: 95 % | TEMPERATURE: 96.6 F | SYSTOLIC BLOOD PRESSURE: 120 MMHG | HEIGHT: 64 IN | BODY MASS INDEX: 37.9 KG/M2 | RESPIRATION RATE: 16 BRPM | WEIGHT: 222 LBS | HEART RATE: 100 BPM | DIASTOLIC BLOOD PRESSURE: 70 MMHG

## 2022-10-26 DIAGNOSIS — M54.50 CHRONIC BILATERAL LOW BACK PAIN WITHOUT SCIATICA: ICD-10-CM

## 2022-10-26 DIAGNOSIS — R73.01 IMPAIRED FASTING GLUCOSE: ICD-10-CM

## 2022-10-26 DIAGNOSIS — F41.9 ANXIETY: ICD-10-CM

## 2022-10-26 DIAGNOSIS — Z79.891 USE OF OPIATES FOR THERAPEUTIC PURPOSES: ICD-10-CM

## 2022-10-26 DIAGNOSIS — G89.29 CHRONIC BILATERAL LOW BACK PAIN WITHOUT SCIATICA: ICD-10-CM

## 2022-10-26 DIAGNOSIS — Z23 NEED FOR VACCINATION: ICD-10-CM

## 2022-10-26 DIAGNOSIS — M15.9 PRIMARY OSTEOARTHRITIS INVOLVING MULTIPLE JOINTS: ICD-10-CM

## 2022-10-26 PROCEDURE — 90686 IIV4 VACC NO PRSV 0.5 ML IM: CPT | Performed by: NURSE PRACTITIONER

## 2022-10-26 PROCEDURE — 90471 IMMUNIZATION ADMIN: CPT | Performed by: NURSE PRACTITIONER

## 2022-10-26 PROCEDURE — 90472 IMMUNIZATION ADMIN EACH ADD: CPT | Performed by: NURSE PRACTITIONER

## 2022-10-26 PROCEDURE — 90746 HEPB VACCINE 3 DOSE ADULT IM: CPT | Performed by: NURSE PRACTITIONER

## 2022-10-26 PROCEDURE — 99214 OFFICE O/P EST MOD 30 MIN: CPT | Mod: 25 | Performed by: NURSE PRACTITIONER

## 2022-10-26 RX ORDER — HYDROXYZINE HYDROCHLORIDE 25 MG/1
12.5-25 TABLET, FILM COATED ORAL 2 TIMES DAILY PRN
Qty: 60 TABLET | Refills: 2 | Status: SHIPPED | OUTPATIENT
Start: 2022-10-26 | End: 2023-02-27

## 2022-10-26 RX ORDER — METFORMIN HYDROCHLORIDE 500 MG/1
1000 TABLET, EXTENDED RELEASE ORAL DAILY
Qty: 180 TABLET | Refills: 3 | Status: SHIPPED | OUTPATIENT
Start: 2022-10-26 | End: 2023-05-09 | Stop reason: SDUPTHER

## 2022-10-26 RX ORDER — CELECOXIB 200 MG/1
200 CAPSULE ORAL 2 TIMES DAILY
Qty: 180 CAPSULE | Refills: 0 | Status: SHIPPED | OUTPATIENT
Start: 2022-10-26 | End: 2023-01-26

## 2022-10-26 ASSESSMENT — FIBROSIS 4 INDEX: FIB4 SCORE: 1.22

## 2022-10-26 NOTE — ASSESSMENT & PLAN NOTE
"In the past 6 mo - more carbs / eating \"whatever.\"   Taking metformin 500 mg XR once daily.   Exercise: not much lately - legs hurt.  "

## 2022-11-15 ENCOUNTER — PATIENT MESSAGE (OUTPATIENT)
Dept: HEALTH INFORMATION MANAGEMENT | Facility: OTHER | Age: 67
End: 2022-11-15

## 2022-11-15 ENCOUNTER — TELEPHONE (OUTPATIENT)
Dept: MEDICAL GROUP | Facility: LAB | Age: 67
End: 2022-11-15
Payer: COMMERCIAL

## 2022-11-15 NOTE — PATIENT COMMUNICATION
Received call from patient this AM regarding Covid symptoms which began last night and positive Covid test this AM. Patient given standard Covid information regarding OTC treatment and quarantine.Information also sent via My Chart for patient reference. Patient would like to be considered for Paxlovid Rx.

## 2022-11-16 ENCOUNTER — OFFICE VISIT (OUTPATIENT)
Dept: MEDICAL GROUP | Facility: LAB | Age: 67
End: 2022-11-16
Payer: COMMERCIAL

## 2022-11-16 VITALS — HEIGHT: 64 IN | WEIGHT: 222 LBS | BODY MASS INDEX: 37.9 KG/M2

## 2022-11-16 DIAGNOSIS — U07.1 COVID-19: ICD-10-CM

## 2022-11-16 PROCEDURE — 99441 PR PHYSICIAN TELEPHONE EVALUATION 5-10 MIN: CPT | Performed by: PHYSICIAN ASSISTANT

## 2022-11-16 ASSESSMENT — FIBROSIS 4 INDEX: FIB4 SCORE: 1.22

## 2022-11-16 NOTE — PROGRESS NOTES
Virtual Check-in Telephone Appointment Visit   Given the importance of social distancing and other strategies recommended to reduce the risk of COVID-19 transmission, I am providing medical care to this patient via a telephone visit in place of an in person visit at the request of the patient.  Verbal consent to telehealth, risks, benefits, and consequences were discussed. Patient retains the right to withdraw at any time. All existing confidentiality protections apply. The patient has access to all transmitted medical information. No dissemination of any patient images or information to other entities without further written consent.     Time at start of call: 1:21 PM       Patient Comments / History:   Fadumo here today with   Exposure:unknown  Date of Exposure:unknown  Symptoms: cough, sore/irritated throat, headache, hoarseness  Denies fever, chills  Denies N/VD  Denies CP, SOB    Symptoms started 11/14/2022  Tested positive 11/15/2022      Vaccinated? 3x    OTC treatments: taking nyquil/dayquil    Labs / Images Reviewed   Positive home covid test, per pt    Assessment and Plan:     1. COVID-19  - Nirmatrelvir&Ritonavir 300/100 20 x 150 MG & 10 x 100MG Tablet Therapy Pack; Take 300 mg nirmatrelvir (two 150 mg tablets) with 100 mg ritonavir (one 100 mg tablet) by mouth, with all three tablets taken together twice daily for 5 days.  Dispense: 30 Each; Refill: 0    Symptomatic: Isolation until 10 days from symptom onset AND 24 hours after resolution of fever (without antipyretic) AND improvement of symptoms; close contacts should also quarantine x 10 days  Reviewed expected course/prognosis of COVID-19-- Advised wearing a mask in public-- Encouraged hand hygiene  Continue OTC treatments   Hold lipitor while taking paxlovid  Red Flags discussed. I conducted the encounter wearing the following PPE: N95, face shield, gloves, The patient was wearing a mask.    Follow-up: PRN      Total Time Spent: 10 min  Carlyn ABARCA  BEV Moore.       Please note that this dictation was created using voice recognition software. I have made every reasonable attempt to correct obvious errors, but there may be errors of grammar and possibly content that I did not discover before finalizing the note.

## 2023-01-03 ENCOUNTER — PATIENT MESSAGE (OUTPATIENT)
Dept: MEDICAL GROUP | Facility: LAB | Age: 68
End: 2023-01-03
Payer: COMMERCIAL

## 2023-01-25 DIAGNOSIS — M15.9 PRIMARY OSTEOARTHRITIS INVOLVING MULTIPLE JOINTS: ICD-10-CM

## 2023-01-26 RX ORDER — CELECOXIB 200 MG/1
CAPSULE ORAL
Qty: 180 CAPSULE | Refills: 0 | Status: SHIPPED | OUTPATIENT
Start: 2023-01-26 | End: 2023-05-09 | Stop reason: SDUPTHER

## 2023-01-26 NOTE — TELEPHONE ENCOUNTER
Received request via: Pharmacy    Was the patient seen in the last year in this department? Yes  11/16/2022  Does the patient have an active prescription (recently filled or refills available) for medication(s) requested? No    Does the patient have custodial Plus and need 100 day supply (blood pressure, diabetes and cholesterol meds only)? Patient does not have SCP

## 2023-02-27 DIAGNOSIS — F41.9 ANXIETY: ICD-10-CM

## 2023-02-27 DIAGNOSIS — M15.9 PRIMARY OSTEOARTHRITIS INVOLVING MULTIPLE JOINTS: ICD-10-CM

## 2023-02-27 DIAGNOSIS — G47.9 SLEEP DISTURBANCE: ICD-10-CM

## 2023-02-27 RX ORDER — ZOLPIDEM TARTRATE 10 MG/1
10 TABLET ORAL NIGHTLY PRN
Qty: 30 TABLET | Refills: 2 | Status: SHIPPED | OUTPATIENT
Start: 2023-02-27 | End: 2023-04-26 | Stop reason: SDUPTHER

## 2023-02-27 RX ORDER — HYDROXYZINE HYDROCHLORIDE 25 MG/1
TABLET, FILM COATED ORAL
Qty: 60 TABLET | Refills: 0 | Status: SHIPPED | OUTPATIENT
Start: 2023-02-27 | End: 2023-06-13 | Stop reason: SDUPTHER

## 2023-02-27 NOTE — TELEPHONE ENCOUNTER
Received request via: Patient    Was the patient seen in the last year in this department? Yes    Does the patient have an active prescription (recently filled or refills available) for medication(s) requested? No    Does the patient have correction Plus and need 100 day supply (blood pressure, diabetes and cholesterol meds only)? Patient does not have SCP

## 2023-04-26 ENCOUNTER — OFFICE VISIT (OUTPATIENT)
Dept: MEDICAL GROUP | Facility: LAB | Age: 68
End: 2023-04-26
Payer: MEDICARE

## 2023-04-26 VITALS
SYSTOLIC BLOOD PRESSURE: 118 MMHG | TEMPERATURE: 96.2 F | DIASTOLIC BLOOD PRESSURE: 70 MMHG | WEIGHT: 218 LBS | HEART RATE: 88 BPM | OXYGEN SATURATION: 98 % | HEIGHT: 66 IN | RESPIRATION RATE: 16 BRPM | BODY MASS INDEX: 35.03 KG/M2

## 2023-04-26 DIAGNOSIS — Z78.0 ASYMPTOMATIC MENOPAUSE: ICD-10-CM

## 2023-04-26 DIAGNOSIS — E11.9 TYPE 2 DIABETES MELLITUS WITHOUT COMPLICATION, WITHOUT LONG-TERM CURRENT USE OF INSULIN (HCC): ICD-10-CM

## 2023-04-26 DIAGNOSIS — Z23 NEED FOR HEPATITIS B VACCINATION: ICD-10-CM

## 2023-04-26 DIAGNOSIS — I10 ESSENTIAL HYPERTENSION: ICD-10-CM

## 2023-04-26 DIAGNOSIS — Z79.891 USE OF OPIATES FOR THERAPEUTIC PURPOSES: ICD-10-CM

## 2023-04-26 DIAGNOSIS — M15.9 PRIMARY OSTEOARTHRITIS INVOLVING MULTIPLE JOINTS: ICD-10-CM

## 2023-04-26 DIAGNOSIS — G47.9 SLEEP DISTURBANCE: ICD-10-CM

## 2023-04-26 DIAGNOSIS — Z23 NEED FOR PNEUMOCOCCAL VACCINATION: ICD-10-CM

## 2023-04-26 PROBLEM — R73.01 IMPAIRED FASTING GLUCOSE: Status: RESOLVED | Noted: 2021-12-09 | Resolved: 2023-04-26

## 2023-04-26 LAB
HBA1C MFR BLD: 6 % (ref ?–5.8)
POCT INT CON NEG: NEGATIVE
POCT INT CON POS: POSITIVE

## 2023-04-26 PROCEDURE — 90746 HEPB VACCINE 3 DOSE ADULT IM: CPT | Performed by: NURSE PRACTITIONER

## 2023-04-26 PROCEDURE — G0009 ADMIN PNEUMOCOCCAL VACCINE: HCPCS | Performed by: NURSE PRACTITIONER

## 2023-04-26 PROCEDURE — 83036 HEMOGLOBIN GLYCOSYLATED A1C: CPT | Performed by: NURSE PRACTITIONER

## 2023-04-26 PROCEDURE — G0010 ADMIN HEPATITIS B VACCINE: HCPCS | Performed by: NURSE PRACTITIONER

## 2023-04-26 PROCEDURE — 99214 OFFICE O/P EST MOD 30 MIN: CPT | Mod: 25 | Performed by: NURSE PRACTITIONER

## 2023-04-26 PROCEDURE — 90677 PCV20 VACCINE IM: CPT | Performed by: NURSE PRACTITIONER

## 2023-04-26 RX ORDER — ZOLPIDEM TARTRATE 10 MG/1
10 TABLET ORAL NIGHTLY PRN
Qty: 90 TABLET | Refills: 1 | Status: SHIPPED | OUTPATIENT
Start: 2023-04-26 | End: 2023-05-09 | Stop reason: SDUPTHER

## 2023-04-26 RX ORDER — ACETAMINOPHEN AND CODEINE PHOSPHATE 300; 30 MG/1; MG/1
TABLET ORAL
COMMUNITY
End: 2023-06-13 | Stop reason: SDUPTHER

## 2023-04-26 ASSESSMENT — FIBROSIS 4 INDEX: FIB4 SCORE: 1.24

## 2023-04-26 ASSESSMENT — PATIENT HEALTH QUESTIONNAIRE - PHQ9: CLINICAL INTERPRETATION OF PHQ2 SCORE: 0

## 2023-04-26 NOTE — PROGRESS NOTES
"CC  Med f/u      HPI:  Fadumo is a 68 yo est female here to f/u on chronic issues / med refills.      1-chronic pain / sleep disturbance:    Suffers from chronic pain in both hips, right worse than left.  Hx of left hip replacement. Chronic low back pain.  Both feet / legs hurt. Occasional hand pain.  tx: 3 x tylenol with codeine occasionally but goes days without taking codeine.  Tylenol with codeine keeps her pain moderately controlled along with celebrex 200 mg bid.  Last refill of Tylenol with codeine was 2/27.  Also takes Ambien for sleep, last refill 3/29.  Considering right hip replacement in a year or less.    2- anxiety : Chronic issue.  Off alprazolam because of use of codeine.  Hydroxyzine does help her calm down in the evening but makes her too tired during the day.  Overall anxiety is stable.  An excellent marriage.  Taking care of 2 grandchildren so her daughter can sleep during the day/daughter works night shift and Fadumo enjoys this.  3-type 2 diabetes: Chronic issue.  Working on avoiding carbs.  Taking metformin 500 mg times 2 at night.  Struggles with her weight.  Struggles with exercise regularly.  Denies burning in her feet.      Exam:  /70 (BP Location: Right arm, Patient Position: Sitting, BP Cuff Size: Large adult)   Pulse 88   Temp (!) 35.7 °C (96.2 °F)   Resp 16   Ht 1.676 m (5' 6\")   Wt 98.9 kg (218 lb)   SpO2 98%    Gen. appears healthy in no distress   Skin appropriate for sex and age   Neck trachea is midline  Lungs unlabored breathing  Heart regular rate  Neuro gait and station normal   Psych appropriate, calm, interactive, well-groomed      A/P:  1. Type 2 diabetes mellitus without complication, without long-term current use of insulin (HCC)  -Patient would certainly benefit from a GLP-1 agonist for glucose control, cardiovascular benefit and weight loss.  Continue metformin.  Trial of Ozempic 0.25 mg once weekly, month 1 and then increasing to 0.5 mg month 2 if tolerated " well.  Discussed side effects and how to use Ozempic.  Discussed risk versus benefit.  Discussed early satiety.  Encouraged her to eat small meals/snacks.  Discussed the importance of exercise, proper foot care, seeing her eye doctor yearly.  A1c excellent today at 6.0.  Was 6.5 within the past 2 years.  Follow-up 6 months.  - POCT Hemoglobin A1C    2. Primary osteoarthritis involving multiple joints  -Stable.  Pain controlled with Celebrex and as needed use of Tylenol with codeine.  - acetaminophen-codeine #3 (TYLENOL #3) 300-30 MG Tab; TAKE 1 TABLET BY MOUTH THREE TIMES DAILY AS NEEDED FOR  MODERATE  PAIN  Dispense: 90 Tablet; Refill: 1    3. Use of opiates for therapeutic purposes  -Updated consent.  In prescribing controlled substances to this patient, I certify that I have obtained and reviewed the medical history of Fadumo Adair. I have also made a good janet effort to obtain applicable records from other providers who have treated the patient and records did not demonstrate any increased risk of substance abuse that would prevent me from prescribing controlled substances.     I have conducted a physical exam and documented it. I have reviewed Ms. Adair’s prescription history as maintained by the [place] Prescription Monitoring Program.     I have assessed the patient’s risk for abuse, dependency, and addiction using the validated Opioid Risk Tool available at https://www.mdcalc.com/wcvzwr-aank-lxxo-ort-narcotic-abuse.     Given the above, I believe the benefits of controlled substance therapy outweigh the risks. The reasons for prescribing controlled substances include non-narcotic, oral analgesic alternatives have been inadequate for pain control. Accordingly, I have discussed the risk and benefits, treatment plan, and alternative therapies with the patient.    - Consent for Opiate Prescription    4. Sleep disturbance  -Stable with Ambien.  Understands long-term risks such as memory loss and fall  precautions.  - zolpidem (AMBIEN) 10 MG Tab; Take 1 Tablet by mouth at bedtime as needed for Sleep for up to 90 days.  Dispense: 90 Tablet; Refill: 1    5. Asymptomatic menopause  - DS-BONE DENSITY STUDY (DEXA); Future    6. Need for pneumococcal vaccination  - Pneumococcal Conjugate Vaccine 20-Valent (19 yrs+)    7. Need for hepatitis B vaccination  - Hepatitis B Vaccine Adult 20+     pt was counseled regarding all immunizations, what the patient is being immunized against, possible side effects, and the importance of immunization.    Recommend updated labs prior to our next appointment.  These were ordered today.

## 2023-05-09 DIAGNOSIS — E78.49 OTHER HYPERLIPIDEMIA: ICD-10-CM

## 2023-05-09 DIAGNOSIS — I10 ESSENTIAL HYPERTENSION: ICD-10-CM

## 2023-05-09 DIAGNOSIS — R60.0 BILATERAL EDEMA OF LOWER EXTREMITY: ICD-10-CM

## 2023-05-09 DIAGNOSIS — G47.9 SLEEP DISTURBANCE: ICD-10-CM

## 2023-05-09 DIAGNOSIS — M15.9 PRIMARY OSTEOARTHRITIS INVOLVING MULTIPLE JOINTS: ICD-10-CM

## 2023-05-09 DIAGNOSIS — R73.01 IMPAIRED FASTING GLUCOSE: ICD-10-CM

## 2023-05-09 RX ORDER — ZOLPIDEM TARTRATE 10 MG/1
10 TABLET ORAL NIGHTLY PRN
Qty: 90 TABLET | Refills: 1 | Status: CANCELLED | OUTPATIENT
Start: 2023-05-09 | End: 2023-08-07

## 2023-05-10 RX ORDER — ATORVASTATIN CALCIUM 40 MG/1
TABLET, FILM COATED ORAL
Qty: 90 TABLET | Refills: 3 | Status: SHIPPED | OUTPATIENT
Start: 2023-05-10 | End: 2024-02-07

## 2023-05-10 RX ORDER — LISINOPRIL 20 MG/1
TABLET ORAL
Qty: 90 TABLET | Refills: 3 | Status: SHIPPED | OUTPATIENT
Start: 2023-05-10 | End: 2024-02-07

## 2023-05-10 RX ORDER — HYDROCHLOROTHIAZIDE 25 MG/1
25 TABLET ORAL DAILY
Qty: 90 TABLET | Refills: 3 | Status: SHIPPED | OUTPATIENT
Start: 2023-05-10 | End: 2024-02-07

## 2023-05-10 RX ORDER — METFORMIN HYDROCHLORIDE 500 MG/1
1000 TABLET, EXTENDED RELEASE ORAL DAILY
Qty: 180 TABLET | Refills: 3 | Status: SHIPPED | OUTPATIENT
Start: 2023-05-10 | End: 2024-02-07

## 2023-05-10 RX ORDER — ZOLPIDEM TARTRATE 10 MG/1
10 TABLET ORAL NIGHTLY PRN
Qty: 90 EACH | Refills: 0 | Status: SHIPPED | OUTPATIENT
Start: 2023-05-10 | End: 2023-08-08

## 2023-05-10 RX ORDER — CELECOXIB 200 MG/1
200 CAPSULE ORAL 2 TIMES DAILY
Qty: 180 CAPSULE | Refills: 0 | Status: SHIPPED | OUTPATIENT
Start: 2023-05-10 | End: 2023-08-07

## 2023-05-24 NOTE — ADDENDUM NOTE
Addended by: LUCILA BLAIR on: 9/20/2018 11:42 AM     Modules accepted: Level of Service     Infliximab Counseling:  I discussed with the patient the risks of infliximab including but not limited to myelosuppression, immunosuppression, autoimmune hepatitis, demyelinating diseases, lymphoma, and serious infections.  The patient understands that monitoring is required including a PPD at baseline and must alert us or the primary physician if symptoms of infection or other concerning signs are noted.

## 2023-06-13 DIAGNOSIS — M54.50 CHRONIC LOW BACK PAIN, UNSPECIFIED BACK PAIN LATERALITY, UNSPECIFIED WHETHER SCIATICA PRESENT: ICD-10-CM

## 2023-06-13 DIAGNOSIS — I10 ESSENTIAL HYPERTENSION: ICD-10-CM

## 2023-06-13 DIAGNOSIS — G89.29 CHRONIC LOW BACK PAIN, UNSPECIFIED BACK PAIN LATERALITY, UNSPECIFIED WHETHER SCIATICA PRESENT: ICD-10-CM

## 2023-06-13 DIAGNOSIS — F41.9 ANXIETY: ICD-10-CM

## 2023-06-13 DIAGNOSIS — R60.0 BILATERAL EDEMA OF LOWER EXTREMITY: ICD-10-CM

## 2023-06-13 RX ORDER — HYDROXYZINE HYDROCHLORIDE 25 MG/1
25 TABLET, FILM COATED ORAL EVERY 8 HOURS PRN
Qty: 60 TABLET | Refills: 0 | Status: SHIPPED | OUTPATIENT
Start: 2023-06-13 | End: 2023-08-24 | Stop reason: SDUPTHER

## 2023-06-13 RX ORDER — ACETAMINOPHEN AND CODEINE PHOSPHATE 300; 30 MG/1; MG/1
TABLET ORAL
Qty: 28 TABLET | Refills: 0 | Status: SHIPPED | OUTPATIENT
Start: 2023-06-13 | End: 2023-08-09 | Stop reason: SDUPTHER

## 2023-06-13 NOTE — TELEPHONE ENCOUNTER
Received request via: Pharmacy    Was the patient seen in the last year in this department? Yes  4/26/23  Does the patient have an active prescription (recently filled or refills available) for medication(s) requested? No    Does the patient have residential Plus and need 100 day supply (blood pressure, diabetes and cholesterol meds only)? Medication is not for cholesterol, blood pressure or diabetes

## 2023-06-13 NOTE — TELEPHONE ENCOUNTER
Received request via: Pharmacy    Was the patient seen in the last year in this department? Yes  4/26/23  Does the patient have an active prescription (recently filled or refills available) for medication(s) requested? No    Does the patient have skilled nursing Plus and need 100 day supply (blood pressure, diabetes and cholesterol meds only)? Medication is not for cholesterol, blood pressure or diabetes

## 2023-08-06 DIAGNOSIS — M15.9 PRIMARY OSTEOARTHRITIS INVOLVING MULTIPLE JOINTS: ICD-10-CM

## 2023-08-07 RX ORDER — CELECOXIB 200 MG/1
200 CAPSULE ORAL 2 TIMES DAILY
Qty: 180 CAPSULE | Refills: 0 | Status: SHIPPED | OUTPATIENT
Start: 2023-08-07 | End: 2023-10-26 | Stop reason: SDUPTHER

## 2023-08-09 DIAGNOSIS — M54.50 CHRONIC LOW BACK PAIN, UNSPECIFIED BACK PAIN LATERALITY, UNSPECIFIED WHETHER SCIATICA PRESENT: ICD-10-CM

## 2023-08-09 DIAGNOSIS — G89.29 CHRONIC LOW BACK PAIN, UNSPECIFIED BACK PAIN LATERALITY, UNSPECIFIED WHETHER SCIATICA PRESENT: ICD-10-CM

## 2023-08-10 RX ORDER — ACETAMINOPHEN AND CODEINE PHOSPHATE 300; 30 MG/1; MG/1
TABLET ORAL
Qty: 90 TABLET | Refills: 0 | Status: SHIPPED | OUTPATIENT
Start: 2023-08-10 | End: 2023-10-02

## 2023-08-24 DIAGNOSIS — F41.9 ANXIETY: ICD-10-CM

## 2023-08-24 RX ORDER — HYDROXYZINE HYDROCHLORIDE 25 MG/1
25 TABLET, FILM COATED ORAL EVERY 8 HOURS PRN
Qty: 60 TABLET | Refills: 0 | Status: SHIPPED | OUTPATIENT
Start: 2023-08-24 | End: 2023-10-26 | Stop reason: SDUPTHER

## 2023-10-02 DIAGNOSIS — M54.50 CHRONIC LOW BACK PAIN, UNSPECIFIED BACK PAIN LATERALITY, UNSPECIFIED WHETHER SCIATICA PRESENT: ICD-10-CM

## 2023-10-02 DIAGNOSIS — G89.29 CHRONIC LOW BACK PAIN, UNSPECIFIED BACK PAIN LATERALITY, UNSPECIFIED WHETHER SCIATICA PRESENT: ICD-10-CM

## 2023-10-02 RX ORDER — ACETAMINOPHEN AND CODEINE PHOSPHATE 300; 30 MG/1; MG/1
TABLET ORAL
Qty: 90 TABLET | Refills: 0 | Status: SHIPPED | OUTPATIENT
Start: 2023-10-02 | End: 2023-10-26 | Stop reason: SDUPTHER

## 2023-10-02 NOTE — TELEPHONE ENCOUNTER
Received request via: Pharmacy    Was the patient seen in the last year in this department? Yes  4/26/23  Does the patient have an active prescription (recently filled or refills available) for medication(s) requested? No    Does the patient have [facility] Plus and need 100 day supply (blood pressure, diabetes and cholesterol meds only)? Medication is not for cholesterol, blood pressure or diabetes

## 2023-10-26 ENCOUNTER — OFFICE VISIT (OUTPATIENT)
Dept: MEDICAL GROUP | Facility: LAB | Age: 68
End: 2023-10-26
Payer: MEDICARE

## 2023-10-26 VITALS
OXYGEN SATURATION: 95 % | RESPIRATION RATE: 16 BRPM | HEART RATE: 86 BPM | SYSTOLIC BLOOD PRESSURE: 120 MMHG | TEMPERATURE: 96.1 F | DIASTOLIC BLOOD PRESSURE: 70 MMHG | WEIGHT: 217 LBS | HEIGHT: 66 IN | BODY MASS INDEX: 34.87 KG/M2

## 2023-10-26 DIAGNOSIS — M15.9 PRIMARY OSTEOARTHRITIS INVOLVING MULTIPLE JOINTS: ICD-10-CM

## 2023-10-26 DIAGNOSIS — F41.9 ANXIETY: ICD-10-CM

## 2023-10-26 DIAGNOSIS — E11.9 TYPE 2 DIABETES MELLITUS WITHOUT COMPLICATION, WITHOUT LONG-TERM CURRENT USE OF INSULIN (HCC): ICD-10-CM

## 2023-10-26 DIAGNOSIS — M54.50 CHRONIC LOW BACK PAIN, UNSPECIFIED BACK PAIN LATERALITY, UNSPECIFIED WHETHER SCIATICA PRESENT: ICD-10-CM

## 2023-10-26 DIAGNOSIS — G89.29 CHRONIC LOW BACK PAIN, UNSPECIFIED BACK PAIN LATERALITY, UNSPECIFIED WHETHER SCIATICA PRESENT: ICD-10-CM

## 2023-10-26 DIAGNOSIS — R92.30 DENSE BREAST TISSUE: ICD-10-CM

## 2023-10-26 DIAGNOSIS — Z23 NEED FOR VACCINATION: ICD-10-CM

## 2023-10-26 DIAGNOSIS — R91.1 LUNG NODULE: ICD-10-CM

## 2023-10-26 DIAGNOSIS — F51.01 PRIMARY INSOMNIA: ICD-10-CM

## 2023-10-26 DIAGNOSIS — M25.552 LEFT HIP PAIN: ICD-10-CM

## 2023-10-26 PROBLEM — Z85.51 HISTORY OF PRIMARY MALIGNANT NEOPLASM OF URINARY BLADDER: Status: ACTIVE | Noted: 2019-03-12

## 2023-10-26 PROCEDURE — 3078F DIAST BP <80 MM HG: CPT | Performed by: NURSE PRACTITIONER

## 2023-10-26 PROCEDURE — G0008 ADMIN INFLUENZA VIRUS VAC: HCPCS | Performed by: NURSE PRACTITIONER

## 2023-10-26 PROCEDURE — 3074F SYST BP LT 130 MM HG: CPT | Performed by: NURSE PRACTITIONER

## 2023-10-26 PROCEDURE — 99214 OFFICE O/P EST MOD 30 MIN: CPT | Mod: 25 | Performed by: NURSE PRACTITIONER

## 2023-10-26 PROCEDURE — 90662 IIV NO PRSV INCREASED AG IM: CPT | Performed by: NURSE PRACTITIONER

## 2023-10-26 PROCEDURE — 1170F FXNL STATUS ASSESSED: CPT | Performed by: NURSE PRACTITIONER

## 2023-10-26 RX ORDER — CELECOXIB 200 MG/1
200 CAPSULE ORAL 2 TIMES DAILY
Qty: 180 CAPSULE | Refills: 3 | Status: SHIPPED | OUTPATIENT
Start: 2023-10-26

## 2023-10-26 RX ORDER — QUETIAPINE FUMARATE 25 MG/1
25-50 TABLET, FILM COATED ORAL EVERY EVENING
Qty: 30 TABLET | Refills: 0 | Status: SHIPPED | OUTPATIENT
Start: 2023-10-26 | End: 2023-11-08 | Stop reason: SDUPTHER

## 2023-10-26 RX ORDER — ZOLPIDEM TARTRATE 10 MG/1
10 TABLET ORAL
Qty: 30 TABLET | Refills: 0 | Status: SHIPPED | OUTPATIENT
Start: 2023-10-26 | End: 2023-11-25

## 2023-10-26 RX ORDER — ZOLPIDEM TARTRATE 10 MG/1
1 TABLET ORAL
COMMUNITY
End: 2023-10-26 | Stop reason: SDUPTHER

## 2023-10-26 RX ORDER — METHYLPREDNISOLONE 4 MG/1
TABLET ORAL
Qty: 21 TABLET | Refills: 0 | Status: SHIPPED | OUTPATIENT
Start: 2023-10-26 | End: 2024-02-01

## 2023-10-26 RX ORDER — HYDROXYZINE HYDROCHLORIDE 25 MG/1
25 TABLET, FILM COATED ORAL EVERY 8 HOURS PRN
Qty: 60 TABLET | Refills: 4 | Status: SHIPPED | OUTPATIENT
Start: 2023-10-26 | End: 2024-02-01

## 2023-10-26 RX ORDER — ACETAMINOPHEN AND CODEINE PHOSPHATE 300; 30 MG/1; MG/1
1 TABLET ORAL EVERY 8 HOURS PRN
Qty: 90 TABLET | Refills: 2 | Status: SHIPPED | OUTPATIENT
Start: 2023-10-26 | End: 2023-11-25

## 2023-10-26 ASSESSMENT — FIBROSIS 4 INDEX: FIB4 SCORE: 1.24

## 2023-10-26 NOTE — PROGRESS NOTES
"Chief Complaint   Patient presents with    Hip Pain       HPI:  Fadumo is a 67-year-old established female here to follow-up on chronic issues and with complaint of new onset acute left hip pain.  She points to her upper left lateral pelvis.  Pain is described as sharp and worse if she lies on her left side or walks.  Pain is worse first thing in the morning and in the evening.  She denies any injuries, traumas or falls.  Has a history of left hip replacement.  Plans on seeing her orthopedist to discuss right hip replacement although she tells me that since her left hip has been hurting, her right hip does not hurt as bad.  Does chronically take Celebrex twice daily.    #2- DM type II: Chronic issue.  Tried to fill Ozempic 6 to 12 months ago and it was very expensive, wondering if we can try and resubmit this now that she is on Medicare.  Struggles with her weight, portion control and controlling her diabetes in general as she would like to keep it in a prediabetic level.  Last a1c 6.5% was 12/2021 and has controlled a1c since with diet / exercise and metformin.  She has comorbid conditions of hypertension, hyperlipidemia, history of smoking, history of bladder cancer.    #3-insomnia: Chronic issue.  Ambien helps her fall asleep but not stay asleep.  Failed 150 mg trazodone in the past.  Is wondering if there is anything else that could help her sleep through the night.    #4-chronic pain: She suffers from chronic pain in both hips and her back.  She takes Tylenol with codeine 3 times per day which helps lower her pain level along with Celebrex twice daily.  Denies negative side effects of codeine such as constipation or sedation.      Exam:   /70 (BP Location: Right arm, Patient Position: Sitting, BP Cuff Size: Large adult)   Pulse 86   Temp (!) 35.6 °C (96.1 °F)   Resp 16   Ht 1.676 m (5' 6\")   Wt 98.4 kg (217 lb)   LMP 09/10/2011   SpO2 95%   BMI 35.02 kg/m²   Gen. appears healthy in no distress " "  Skin appropriate for sex and age   Neck trachea is midline  Lungs unlabored breathing  Heart regular rate  Musculoskeletal: She does not have left hip rash, bruising or deformity.  She experiences discomfort with palpation along her left upper pelvis and hip without guarding.  She does have full range of motion of her left hip although experiences discomfort with abduction.  Neuro gait and station normal   Psych appropriate, calm, interactive, well-groomed      Assessment / Plan:  \"  1. Chronic low back pain, unspecified back pain laterality, unspecified whether sciatica present  acetaminophen-codeine #3 (TYLENOL #3) 300-30 MG Tab      2. Anxiety  hydrOXYzine HCl (ATARAX) 25 MG Tab      3. Primary osteoarthritis involving multiple joints  celecoxib (CELEBREX) 200 MG Cap      4. Type 2 diabetes mellitus without complication, without long-term current use of insulin (HCC)  Dulaglutide 0.75 MG/0.5ML Solution Pen-injector      5. Primary insomnia  zolpidem (AMBIEN) 10 MG Tab      6. Lung nodule  CT-CHEST (THORAX) W/O      7. Dense breast tissue  US-SCREENING WHOLE BREAST UNILATERAL (3D SCREENING)      8. Left hip pain        9. Need for vaccination  INFLUENZA VACCINE, HIGH DOSE (65+ ONLY)        Chronic pain decently controlled, refilled Tylenol with codeine.  UDS/CSA are up-to-date.  Reviewed  profile.    In terms of her acute left hip pain, encouraged her to make an appointment with Dr. Myrick.  Stop Celebrex for 6 days and start a Medrol Dosepak to decrease inflammation.  Discussed range of motion exercises and stretches.  Discussed side effects of a Medrol Dosepak.    Anxiety at night decently controlled with hydroxyzine although it makes her too tired during the day and we discussed taking only a half during the day if needed.    It seems that her Medicare prescription drug coverage will cover Trulicity based on computer feedback and I changed Ozempic to Trulicity, encouraging her to inject 0.75 mg weekly for " the first month and then getting in touch with me regarding how she is doing on this so that I can increase it to 1.5 mg months two and potentially up to 3 mg month 3.  Continue metformin.  Labs in the lobby today encouraged.  Discussed side effects and how to use a GLP-1 agonist, in depth.    Trial of changing Ambien to quetiapine 25 to 50 mg, 45 minutes prior to bedtime and allowing 8 hours for sleep.  Discussed potential side effects of quetiapine such as sleepiness.  She understands that she should not take Ambien and quetiapine on the same night.  I encouraged her to eventually discontinue all use of Ambien due to increased risk of memory side effects.    Influenza vaccine updated.    Follow-up 3 months for annual wellness, sooner with any problems or concerns.    Side effects of all medications prescribed today were discussed with the patient including how to take the medications and proper dosage. Discussed repercussions of not taking the medications as prescribed. Instructed to call the office should she have any negative side effects or problems with the medications.    In prescribing controlled substances to this patient, I certify that I have obtained and reviewed the medical history of Fadumo Adair. I have also made a good janet effort to obtain applicable records from other providers who have treated the patient and records did not demonstrate any increased risk of substance abuse that would prevent me from prescribing controlled substances.     I have conducted a physical exam and documented it. I have reviewed Ms. Adair’s prescription history as maintained by the [place] Prescription Monitoring Program.     I have assessed the patient’s risk for abuse, dependency, and addiction using the validated Opioid Risk Tool available at https://www.mdcalc.com/yevwia-ihro-zdsf-ort-narcotic-abuse.     Given the above, I believe the benefits of controlled substance therapy outweigh the risks. The reasons  for prescribing controlled substances include non-narcotic, oral analgesic alternatives have been inadequate for pain control. Accordingly, I have discussed the risk and benefits, treatment plan, and alternative therapies with the patient.      Also reviewed last CT scan of her lungs showing a nodule encouraged updated CT scan of her lungs.    She requested a whole breast ultrasound in addition to her mammogram because of her history of dense breast tissue.

## 2023-11-02 ENCOUNTER — HOSPITAL ENCOUNTER (OUTPATIENT)
Dept: RADIOLOGY | Facility: MEDICAL CENTER | Age: 68
End: 2023-11-02
Attending: NURSE PRACTITIONER
Payer: MEDICARE

## 2023-11-02 DIAGNOSIS — R91.1 LUNG NODULE: ICD-10-CM

## 2023-11-02 PROCEDURE — 71250 CT THORAX DX C-: CPT

## 2023-11-08 DIAGNOSIS — M54.50 CHRONIC LOW BACK PAIN, UNSPECIFIED BACK PAIN LATERALITY, UNSPECIFIED WHETHER SCIATICA PRESENT: ICD-10-CM

## 2023-11-08 DIAGNOSIS — G89.29 CHRONIC LOW BACK PAIN, UNSPECIFIED BACK PAIN LATERALITY, UNSPECIFIED WHETHER SCIATICA PRESENT: ICD-10-CM

## 2023-11-08 RX ORDER — QUETIAPINE FUMARATE 25 MG/1
25-50 TABLET, FILM COATED ORAL EVERY EVENING
Qty: 60 TABLET | Refills: 0 | Status: SHIPPED | OUTPATIENT
Start: 2023-11-08 | End: 2023-12-07

## 2023-11-08 RX ORDER — ACETAMINOPHEN AND CODEINE PHOSPHATE 300; 30 MG/1; MG/1
1 TABLET ORAL EVERY 8 HOURS PRN
Qty: 90 TABLET | Refills: 2 | Status: CANCELLED | OUTPATIENT
Start: 2023-11-08 | End: 2023-12-08

## 2023-11-20 ENCOUNTER — HOSPITAL ENCOUNTER (OUTPATIENT)
Dept: LAB | Facility: MEDICAL CENTER | Age: 68
End: 2023-11-20
Attending: NURSE PRACTITIONER
Payer: MEDICARE

## 2023-11-20 DIAGNOSIS — I10 ESSENTIAL HYPERTENSION: ICD-10-CM

## 2023-11-20 DIAGNOSIS — E11.9 TYPE 2 DIABETES MELLITUS WITHOUT COMPLICATION, WITHOUT LONG-TERM CURRENT USE OF INSULIN (HCC): ICD-10-CM

## 2023-11-20 LAB
ALBUMIN SERPL BCP-MCNC: 4.7 G/DL (ref 3.2–4.9)
ALBUMIN/GLOB SERPL: 1.6 G/DL
ALP SERPL-CCNC: 67 U/L (ref 30–99)
ALT SERPL-CCNC: 11 U/L (ref 2–50)
ANION GAP SERPL CALC-SCNC: 12 MMOL/L (ref 7–16)
AST SERPL-CCNC: 13 U/L (ref 12–45)
BASOPHILS # BLD AUTO: 0.8 % (ref 0–1.8)
BASOPHILS # BLD: 0.05 K/UL (ref 0–0.12)
BILIRUB SERPL-MCNC: 0.4 MG/DL (ref 0.1–1.5)
BUN SERPL-MCNC: 32 MG/DL (ref 8–22)
CALCIUM ALBUM COR SERPL-MCNC: 9.4 MG/DL (ref 8.5–10.5)
CALCIUM SERPL-MCNC: 10 MG/DL (ref 8.5–10.5)
CHLORIDE SERPL-SCNC: 101 MMOL/L (ref 96–112)
CHOLEST SERPL-MCNC: 167 MG/DL (ref 100–199)
CO2 SERPL-SCNC: 27 MMOL/L (ref 20–33)
CREAT SERPL-MCNC: 1.05 MG/DL (ref 0.5–1.4)
EOSINOPHIL # BLD AUTO: 0.18 K/UL (ref 0–0.51)
EOSINOPHIL NFR BLD: 3 % (ref 0–6.9)
ERYTHROCYTE [DISTWIDTH] IN BLOOD BY AUTOMATED COUNT: 48.6 FL (ref 35.9–50)
EST. AVERAGE GLUCOSE BLD GHB EST-MCNC: 140 MG/DL
FASTING STATUS PATIENT QL REPORTED: NORMAL
GFR SERPLBLD CREATININE-BSD FMLA CKD-EPI: 59 ML/MIN/1.73 M 2
GLOBULIN SER CALC-MCNC: 3 G/DL (ref 1.9–3.5)
GLUCOSE SERPL-MCNC: 103 MG/DL (ref 65–99)
HBA1C MFR BLD: 6.5 % (ref 4–5.6)
HCT VFR BLD AUTO: 35.1 % (ref 37–47)
HDLC SERPL-MCNC: 44 MG/DL
HGB BLD-MCNC: 11 G/DL (ref 12–16)
IMM GRANULOCYTES # BLD AUTO: 0.02 K/UL (ref 0–0.11)
IMM GRANULOCYTES NFR BLD AUTO: 0.3 % (ref 0–0.9)
LDLC SERPL CALC-MCNC: 92 MG/DL
LYMPHOCYTES # BLD AUTO: 2.26 K/UL (ref 1–4.8)
LYMPHOCYTES NFR BLD: 37 % (ref 22–41)
MCH RBC QN AUTO: 28.5 PG (ref 27–33)
MCHC RBC AUTO-ENTMCNC: 31.3 G/DL (ref 32.2–35.5)
MCV RBC AUTO: 90.9 FL (ref 81.4–97.8)
MONOCYTES # BLD AUTO: 0.5 K/UL (ref 0–0.85)
MONOCYTES NFR BLD AUTO: 8.2 % (ref 0–13.4)
NEUTROPHILS # BLD AUTO: 3.09 K/UL (ref 1.82–7.42)
NEUTROPHILS NFR BLD: 50.7 % (ref 44–72)
NRBC # BLD AUTO: 0 K/UL
NRBC BLD-RTO: 0 /100 WBC (ref 0–0.2)
PLATELET # BLD AUTO: 241 K/UL (ref 164–446)
PMV BLD AUTO: 11.6 FL (ref 9–12.9)
POTASSIUM SERPL-SCNC: 4.6 MMOL/L (ref 3.6–5.5)
PROT SERPL-MCNC: 7.7 G/DL (ref 6–8.2)
RBC # BLD AUTO: 3.86 M/UL (ref 4.2–5.4)
SODIUM SERPL-SCNC: 140 MMOL/L (ref 135–145)
TRIGL SERPL-MCNC: 153 MG/DL (ref 0–149)
TSH SERPL DL<=0.005 MIU/L-ACNC: 1.45 UIU/ML (ref 0.38–5.33)
WBC # BLD AUTO: 6.1 K/UL (ref 4.8–10.8)

## 2023-11-20 PROCEDURE — 80061 LIPID PANEL: CPT

## 2023-11-20 PROCEDURE — 84443 ASSAY THYROID STIM HORMONE: CPT

## 2023-11-20 PROCEDURE — 85025 COMPLETE CBC W/AUTO DIFF WBC: CPT

## 2023-11-20 PROCEDURE — 80053 COMPREHEN METABOLIC PANEL: CPT

## 2023-11-20 PROCEDURE — 83036 HEMOGLOBIN GLYCOSYLATED A1C: CPT | Mod: GA

## 2023-11-20 PROCEDURE — 36415 COLL VENOUS BLD VENIPUNCTURE: CPT

## 2023-11-21 DIAGNOSIS — R79.89 ABNORMAL CBC: ICD-10-CM

## 2023-11-28 ENCOUNTER — HOSPITAL ENCOUNTER (OUTPATIENT)
Dept: RADIOLOGY | Facility: MEDICAL CENTER | Age: 68
End: 2023-11-28
Attending: NURSE PRACTITIONER
Payer: MEDICARE

## 2023-11-28 DIAGNOSIS — Z78.0 ASYMPTOMATIC MENOPAUSE: ICD-10-CM

## 2023-11-28 DIAGNOSIS — Z12.31 ENCOUNTER FOR MAMMOGRAM TO ESTABLISH BASELINE MAMMOGRAM: ICD-10-CM

## 2023-11-28 PROCEDURE — 77063 BREAST TOMOSYNTHESIS BI: CPT

## 2023-11-28 PROCEDURE — 77080 DXA BONE DENSITY AXIAL: CPT

## 2023-12-05 ENCOUNTER — PATIENT MESSAGE (OUTPATIENT)
Dept: MEDICAL GROUP | Facility: LAB | Age: 68
End: 2023-12-05
Payer: MEDICARE

## 2023-12-07 RX ORDER — QUETIAPINE FUMARATE 25 MG/1
TABLET, FILM COATED ORAL
Qty: 60 TABLET | Refills: 0 | Status: SHIPPED | OUTPATIENT
Start: 2023-12-07 | End: 2023-12-07 | Stop reason: SDUPTHER

## 2023-12-09 RX ORDER — QUETIAPINE FUMARATE 25 MG/1
TABLET, FILM COATED ORAL
Qty: 60 TABLET | Refills: 3 | Status: SHIPPED | OUTPATIENT
Start: 2023-12-09 | End: 2024-01-08 | Stop reason: SDUPTHER

## 2024-01-08 RX ORDER — QUETIAPINE FUMARATE 25 MG/1
TABLET, FILM COATED ORAL
Qty: 60 TABLET | Refills: 3 | Status: SHIPPED | OUTPATIENT
Start: 2024-01-08

## 2024-01-08 NOTE — TELEPHONE ENCOUNTER
Received request via: Pharmacy    Was the patient seen in the last year in this department? Yes  LOV : 10/26/2023   Does the patient have an active prescription (recently filled or refills available) for medication(s) requested? Yes.    Does the patient have care home Plus and need 100 day supply (blood pressure, diabetes and cholesterol meds only)? Patient does not have SCP

## 2024-02-01 ENCOUNTER — HOSPITAL ENCOUNTER (OUTPATIENT)
Facility: MEDICAL CENTER | Age: 69
End: 2024-02-01
Attending: NURSE PRACTITIONER
Payer: MEDICARE

## 2024-02-01 ENCOUNTER — OFFICE VISIT (OUTPATIENT)
Dept: MEDICAL GROUP | Facility: LAB | Age: 69
End: 2024-02-01
Payer: MEDICARE

## 2024-02-01 VITALS
SYSTOLIC BLOOD PRESSURE: 118 MMHG | RESPIRATION RATE: 16 BRPM | BODY MASS INDEX: 33.91 KG/M2 | DIASTOLIC BLOOD PRESSURE: 76 MMHG | WEIGHT: 211 LBS | HEART RATE: 80 BPM | HEIGHT: 66 IN | TEMPERATURE: 96.1 F | OXYGEN SATURATION: 96 %

## 2024-02-01 DIAGNOSIS — R91.1 LUNG NODULE: ICD-10-CM

## 2024-02-01 DIAGNOSIS — I10 ESSENTIAL HYPERTENSION: ICD-10-CM

## 2024-02-01 DIAGNOSIS — E04.1 THYROID NODULE: ICD-10-CM

## 2024-02-01 DIAGNOSIS — Z85.51 HISTORY OF PRIMARY MALIGNANT NEOPLASM OF URINARY BLADDER: ICD-10-CM

## 2024-02-01 DIAGNOSIS — E11.9 TYPE 2 DIABETES MELLITUS WITHOUT COMPLICATION, WITHOUT LONG-TERM CURRENT USE OF INSULIN (HCC): ICD-10-CM

## 2024-02-01 DIAGNOSIS — G47.00 INSOMNIA, UNSPECIFIED TYPE: ICD-10-CM

## 2024-02-01 DIAGNOSIS — M25.551 RIGHT HIP PAIN: ICD-10-CM

## 2024-02-01 DIAGNOSIS — F41.9 ANXIETY: ICD-10-CM

## 2024-02-01 DIAGNOSIS — K21.9 GASTROESOPHAGEAL REFLUX DISEASE, UNSPECIFIED WHETHER ESOPHAGITIS PRESENT: ICD-10-CM

## 2024-02-01 DIAGNOSIS — Z90.710 HISTORY OF HYSTERECTOMY: ICD-10-CM

## 2024-02-01 DIAGNOSIS — Z00.00 MEDICARE ANNUAL WELLNESS VISIT, SUBSEQUENT: ICD-10-CM

## 2024-02-01 DIAGNOSIS — E78.49 OTHER HYPERLIPIDEMIA: ICD-10-CM

## 2024-02-01 DIAGNOSIS — G89.29 CHRONIC LOW BACK PAIN, UNSPECIFIED BACK PAIN LATERALITY, UNSPECIFIED WHETHER SCIATICA PRESENT: ICD-10-CM

## 2024-02-01 DIAGNOSIS — D50.9 IRON DEFICIENCY ANEMIA, UNSPECIFIED IRON DEFICIENCY ANEMIA TYPE: ICD-10-CM

## 2024-02-01 DIAGNOSIS — Z87.891 HISTORY OF SMOKING: ICD-10-CM

## 2024-02-01 DIAGNOSIS — Z80.41 FAMILY HISTORY OF MALIGNANT NEOPLASM OF OVARY: ICD-10-CM

## 2024-02-01 DIAGNOSIS — R51.9 GENERALIZED HEADACHES: ICD-10-CM

## 2024-02-01 DIAGNOSIS — M54.50 CHRONIC LOW BACK PAIN, UNSPECIFIED BACK PAIN LATERALITY, UNSPECIFIED WHETHER SCIATICA PRESENT: ICD-10-CM

## 2024-02-01 DIAGNOSIS — R94.4 DECREASED GFR: ICD-10-CM

## 2024-02-01 DIAGNOSIS — Z85.51 HISTORY OF BLADDER CANCER: ICD-10-CM

## 2024-02-01 DIAGNOSIS — Z96.642 STATUS POST LEFT HIP REPLACEMENT: ICD-10-CM

## 2024-02-01 DIAGNOSIS — E66.9 OBESITY (BMI 35.0-39.9 WITHOUT COMORBIDITY): ICD-10-CM

## 2024-02-01 PROBLEM — M19.90 ARTHRITIS: Status: RESOLVED | Noted: 2020-07-16 | Resolved: 2024-02-01

## 2024-02-01 LAB — AMBIGUOUS DTTM AMBI4: NORMAL

## 2024-02-01 PROCEDURE — 82043 UR ALBUMIN QUANTITATIVE: CPT

## 2024-02-01 PROCEDURE — 1170F FXNL STATUS ASSESSED: CPT | Performed by: NURSE PRACTITIONER

## 2024-02-01 PROCEDURE — 3078F DIAST BP <80 MM HG: CPT | Performed by: NURSE PRACTITIONER

## 2024-02-01 PROCEDURE — 82570 ASSAY OF URINE CREATININE: CPT

## 2024-02-01 PROCEDURE — 3074F SYST BP LT 130 MM HG: CPT | Performed by: NURSE PRACTITIONER

## 2024-02-01 PROCEDURE — G0439 PPPS, SUBSEQ VISIT: HCPCS | Performed by: NURSE PRACTITIONER

## 2024-02-01 RX ORDER — TIRZEPATIDE 7.5 MG/.5ML
7.5 INJECTION, SOLUTION SUBCUTANEOUS
Qty: 2 ML | Refills: 5 | Status: SHIPPED | OUTPATIENT
Start: 2024-02-01

## 2024-02-01 RX ORDER — HYDROXYZINE PAMOATE 25 MG/1
25 CAPSULE ORAL 3 TIMES DAILY PRN
Qty: 30 CAPSULE | Refills: 0 | Status: SHIPPED | OUTPATIENT
Start: 2024-02-01 | End: 2024-03-12

## 2024-02-01 ASSESSMENT — ACTIVITIES OF DAILY LIVING (ADL): BATHING_REQUIRES_ASSISTANCE: 0

## 2024-02-01 ASSESSMENT — ENCOUNTER SYMPTOMS: GENERAL WELL-BEING: FAIR

## 2024-02-01 ASSESSMENT — FIBROSIS 4 INDEX: FIB4 SCORE: 1.07

## 2024-02-01 ASSESSMENT — PATIENT HEALTH QUESTIONNAIRE - PHQ9: CLINICAL INTERPRETATION OF PHQ2 SCORE: 0

## 2024-02-01 NOTE — PROGRESS NOTES
Chief Complaint   Patient presents with    Medicare Annual Wellness       HPI:  Fadumo is a 68 y.o. est female here for Medicare Annual Wellness Visit.    Seeing urology today for cystoscopy with hx of bladder cancer.   Overall doing well.  Exercising and trying to eat healthy.  Unable to obtain Trulicity because of shortage and interested in a relative of Trulicity.  She does have type 2 diabetes, last A1c was 6.5 in November.  Also had a bit of an abnormal CBC and has started increasing her iron.  Does have chronic heartburn.  Cut back on Excedrin Migraine.  Experiences anxiety & does not find 25 mg hydroxyzine in the form of Atarax helpful.  Needs something for anxiety at the dentist, on airplanes and during stressful times.  Uses Tylenol with codeine up to 3 times a day for arthritis pain, therefore cannot have Xanax.    Patient Active Problem List    Diagnosis Date Noted    Status post left hip replacement 03/09/2022    Right hip pain 09/08/2021    Anxiety 03/04/2021    Lung nodule - 5 mm 8/2020 08/31/2020    History of smoking (quit 2010 - total of 30 yrs) 08/31/2020    Arthritis 07/16/2020    Gastroesophageal reflux disease 07/16/2020    History of primary malignant neoplasm of urinary bladder 03/12/2019    History of bladder cancer 08/21/2018    Decreased GFR 08/21/2018    History of hysterectomy 02/27/2018    Obesity (BMI 35.0-39.9 without comorbidity) ([facility]) 02/27/2018    Generalized headaches - tylenol migraine works .  present since 50's. 12/19/2016    Essential hypertension 10/23/2015    Insomnia 05/14/2014    Family history of ovarian cancer 08/04/2010    Hyperlipemia     Low back pain        Current Outpatient Medications   Medication Sig Dispense Refill    QUEtiapine (SEROQUEL) 25 MG Tab TAKE 1 TO 2 TABLETS BY MOUTH EVERY EVENING. DO NOT TAKE WITH AMBIEN. TAKE 45 MINUTES BEFORE BEDTIME EVERY NIGHT AND ALLOW 8 HOURS FOR SLEEP 60 Tablet 3    Dulaglutide 1.5 MG/0.5ML Solution Pen-injector Inject 0.5 mL  under the skin every 7 days. 2.24 mL 5    hydrOXYzine HCl (ATARAX) 25 MG Tab Take 1 Tablet by mouth every 8 hours as needed for Anxiety. 60 Tablet 4    celecoxib (CELEBREX) 200 MG Cap Take 1 Capsule by mouth 2 times a day. 180 Capsule 3    methylPREDNISolone (MEDROL DOSEPAK) 4 MG Tablet Therapy Pack Take as directed.  Stop celebrex while taking. 21 Tablet 0    hydroCHLOROthiazide (HYDRODIURIL) 25 MG Tab Take 1 Tablet by mouth every day. 90 Tablet 3    atorvastatin (LIPITOR) 40 MG Tab Take 1 tablet by mouth once daily 90 Tablet 3    metFORMIN ER (GLUCOPHAGE XR) 500 MG TABLET SR 24 HR Take 2 Tablets by mouth every day. With a meal. 180 Tablet 3    lisinopril (PRINIVIL) 20 MG Tab Take 1 tablet by mouth once daily 90 Tablet 3    Semaglutide,0.25 or 0.5MG/DOS, 2 MG/1.5ML Solution Pen-injector Inject 0.25 mg under the skin every 7 days. 1.5 mL 11    Lancets Lancets order: Lancets for meter covered by insurance. Sig: use bid and prn ssx high or low sugar. #100 RF x 3 100 Each 3    vitamin D (CHOLECALCIFEROL) 1000 UNIT Tab Take 1,000 Units by mouth every day.      coenzyme Q-10 30 MG capsule Take 60 mg by mouth every day.       No current facility-administered medications for this visit.          Current supplements as per medication list.     Allergies: Patient has no known allergies.    Current social contact/activities: grandkids/ food bank/ Jainism/ neighbors/ bunco     She  reports that she quit smoking about 13 years ago. Her smoking use included cigarettes. She started smoking about 43 years ago. She has a 15.0 pack-year smoking history. She has never used smokeless tobacco. She reports current alcohol use of about 2.4 oz of alcohol per week. She reports that she does not currently use drugs.  Counseling given: Not Answered      ROS:    Gait: Uses no assistive device  Ostomy: No  Other tubes: No  Amputations: No  Chronic oxygen use: No  Last eye exam: years ago  Wears hearing aids: No   : Denies any urinary leakage  during the last 6 months    Screening:    Depression Screening  Little interest or pleasure in doing things?  0 - not at all  Feeling down, depressed , or hopeless? 0 - not at all  Patient Health Questionnaire Score: 0     If depressive symptoms identified deferred to follow up visit unless specifically addressed in assessment and plan.    Interpretation of PHQ-9 Total Score   Score Severity   1-4 No Depression   5-9 Mild Depression   10-14 Moderate Depression   15-19 Moderately Severe Depression   20-27 Severe Depression    Screening for Cognitive Impairment  Do you or any of your friends or family members have any concern about your memory? No  Three Minute Recall (Banana, Sunrise, Chair) 3/3    Clem clock face with all 12 numbers and set the hands to show 20 past 8.  Yes    Cognitive concerns identified deferred for follow up unless specifically addressed in assessment and plan.    Fall Risk Assessment  Has the patient had two or more falls in the last year or any fall with injury in the last year?  No    Safety Assessment  Do you always wear your seatbelt?  Yes  Any changes to home needed to function safely? No  Difficulty hearing.  Yes  Patient counseled about all safety risks that were identified.    Functional Assessment ADLs  Are there any barriers preventing you from cooking for yourself or meeting nutritional needs?  No.    Are there any barriers preventing you from driving safely or obtaining transportation?  No.    Are there any barriers preventing you from using a telephone or calling for help?  No    Are there any barriers preventing you from shopping?  No.    Are there any barriers preventing you from taking care of your own finances?  No    Are there any barriers preventing you from managing your medications?  No    Are there any barriers preventing you from showering, bathing or dressing yourself? No    Are there any barriers preventing you from doing housework or laundry? No  Are there any barriers  preventing you from using the toilet?No  Are you currently engaging in any exercise or physical activity?  Yes.      Self-Assessment of Health  What is your perception of your health? Fair  Do you sleep more than six hours a night? No  In the past 7 days, how much did pain keep you from doing your normal work? None  Do you spend quality time with family or friends (virtually or in person)? Yes  Do you usually eat a heart healthy diet that constists of a variety of fruits, vegetables, whole grains and fiber? Yes  Do you eat foods high in fat and/or Fast Food more than three times per week? No    Advance Care Planning  Do you have an Advance Directive, Living Will, Durable Power of , or POLST? Yes  Advance Directive       is not on file - instructed patient to bring in a copy to scan into their chart      Health Maintenance Summary            Overdue - Annual Wellness Visit (Yearly) Overdue - never done      No completion history exists for this topic.              Overdue - Diabetes: Monofilament / LE Exam (Yearly) Overdue - never done      No completion history exists for this topic.              Overdue - Diabetes: Retinopathy Screening (Yearly) Overdue - never done      No completion history exists for this topic.              Overdue - Diabetes: Urine Protein Screening (Yearly) Overdue since 9/5/2019 09/05/2018  MICROALBUMIN CREAT RATIO URINE              Postponed - COVID-19 Vaccine (4 - 2023-24 season) Postponed until 10/26/2024      12/03/2021  Imm Admin: PFIZER PURPLE CAP SARS-COV-2 VACCINATION (12+)    04/20/2021  Imm Admin: MODERNA SARS-COV-2 VACCINE (12+)    03/19/2021  Imm Admin: MODERNA SARS-COV-2 VACCINE (12+)              A1c Screening (Every 6 Months) Next due on 5/20/2024 11/20/2023  HEMOGLOBIN A1C    04/26/2023  POCT Hemoglobin A1C    10/24/2022  HEMOGLOBIN A1C    03/09/2022  POCT  A1C    12/03/2021  HEMOGLOBIN A1C    Only the first 5 history entries have been loaded, but more  history exists.              Fasting Lipid Profile (Yearly) Next due on 11/20/2024 11/20/2023  Lipid Profile    10/24/2022  LIPID PANEL    12/03/2021  LIPID PANEL    08/26/2020  Lipid Profile    09/05/2018  LIPID PROFILE    Only the first 5 history entries have been loaded, but more history exists.              SERUM CREATININE (Yearly) Next due on 11/20/2024 11/20/2023  Comp Metabolic Panel    10/24/2022  COMP METABOLIC PANEL    12/03/2021  COMP METABOLIC PANEL    08/26/2020  Comp Metabolic Panel    09/05/2018  COMP METABOLIC PANEL    Only the first 5 history entries have been loaded, but more history exists.              Mammogram (Every 2 Years) Next due on 11/28/2025 11/28/2023  MA-SCREENING MAMMO BILAT W/TOMOSYNTHESIS W/CAD    10/19/2022  MA-SCREENING MAMMO BILAT W/TOMOSYNTHESIS W/CAD    08/20/2020  MA-SCREENING MAMMO BILAT W/TOMOSYNTHESIS W/CAD    03/21/2018  MA-MAMMO SCREENING BILAT W/KETURAH W/CAD    05/30/2013  MA-SCREENING MAMMOGRAM W/ CAD    Only the first 5 history entries have been loaded, but more history exists.              Bone Density Scan (Every 5 Years) Next due on 11/28/2028 11/28/2023  DS-BONE DENSITY STUDY (DEXA)              Colorectal Cancer Screening (Colonoscopy - Preferred) Next due on 8/29/2029 08/29/2019  REFERRAL TO GI FOR COLONOSCOPY    10/12/2009  REFERRAL TO GI FOR COLONOSCOPY    10/12/2009  COLOGUARD COLON CANCER SCREENING              IMM DTaP/Tdap/Td Vaccine (3 - Td or Tdap) Next due on 4/28/2030 04/28/2020  Imm Admin: Tdap Vaccine    12/13/2010  Imm Admin: Tdap Vaccine              Hepatitis A Vaccine (Hep A) (Series Information) Aged Out      08/30/2012  Imm Admin: Hep A/HEP B Combined Vaccine (TwinRix)    12/13/2010  Imm Admin: Hepatitis A Vaccine, Ped/Adol    12/13/2010  Imm Admin: Hepatitis A Vaccine, Adult              Zoster (Shingles) Vaccines (Series Information) Completed      10/15/2020  Imm Admin: Zoster Vaccine Recombinant (RZV) (SHINGRIX)     08/31/2020  Imm Admin: Zoster Vaccine Recombinant (RZV) (SHINGRIX)              Hepatitis B Vaccine (Hep B) (Series Information) Completed      04/26/2023  Imm Admin: Hepatitis B Vaccine (Adol/Adult)    10/26/2022  Imm Admin: Hepatitis B Vaccine (Adol/Adult)    08/30/2012  Imm Admin: Hep A/HEP B Combined Vaccine (TwinRix)              Pneumococcal Vaccine: 65+ Years (Series Information) Completed      04/26/2023  Imm Admin: Pneumococcal Conjugate Vaccine (PCV20)              Influenza Vaccine (Series Information) Completed      10/26/2023  Imm Admin: Influenza Vaccine Adult HD    10/26/2022  Imm Admin: Influenza Vaccine Quad Inj (Pf)    11/02/2021  Imm Admin: Influenza Vaccine Quad Inj (Pf)    10/15/2020  Imm Admin: Influenza Vaccine Quad Inj (Pf)    11/08/2019  Imm Admin: Influenza Vaccine Quad Inj (Pf)    Only the first 5 history entries have been loaded, but more history exists.              HPV Vaccines (Series Information) Aged Out      No completion history exists for this topic.              Meningococcal Immunization (Series Information) Aged Out      No completion history exists for this topic.              Discontinued - Cervical Cancer Screening  Discontinued        Frequency changed to Never automatically (Topic No Longer Applies)    03/08/2018  PATHOLOGY GYN SPECIMEN    03/08/2018  THINPREP PAP,REFLEX HPV ON ASC-US ONLY    05/14/2014  pap 21-29 no std    05/14/2014  PATHOLOGY GYN SPECIMEN    Only the first 5 history entries have been loaded, but more history exists.              Discontinued - Polio Vaccine (Inactivated Polio)  Discontinued      08/30/2012  Imm Admin: IPV              Discontinued - Hepatitis C Screening  Discontinued      No completion history exists for this topic.                    Patient Care Team:  DORYS Pineda as PCP - General (Family Medicine)  Meet Barrios M.D. as Consulting Physician (Phys Med and Rehab)  [facility]  "(Inactive)        Social History     Tobacco Use    Smoking status: Former     Current packs/day: 0.00     Average packs/day: 0.5 packs/day for 30.0 years (15.0 ttl pk-yrs)     Types: Cigarettes     Start date: 1981     Quit date: 2011     Years since quittin.0    Smokeless tobacco: Never   Vaping Use    Vaping Use: Never used   Substance Use Topics    Alcohol use: Yes     Alcohol/week: 2.4 oz     Types: 4 Standard drinks or equivalent per week     Comment: 4 a week     Drug use: Not Currently     Family History   Problem Relation Age of Onset    Cancer Mother     Heart Disease Father      She  has a past medical history of Anemia, Arrhythmia, Arthritis, Cancer (HCC), Heart burn, High cholesterol, HTN, Hyperlipemia, Indigestion, Low back pain (13), and Urinary bladder disorder.   Past Surgical History:   Procedure Laterality Date    VA TOTAL HIP ARTHROPLASTY Left 2022    Procedure: LEFT ANTERIOR TOTAL HIP ARTHROPLASTY;  Surgeon: Sonido Myrick M.D.;  Location: [facility] [place];  Service: Orthopedics    TRANS URETHRAL RESECTION BLADDER  2018    Procedure: TRANS URETHRAL RESECTION BLADDER- TUMOR;  Surgeon: Sigifredo Montes M.D.;  Location: [facility];  Service: Urology    CYSTOSCOPY  10/11/2011    Performed by MOLINA LOGAN at SURGERY [facility]    VAGINAL PERINEAL EXAM REPAIR  10/11/2011    Performed by MOLINA LOGAN at [facility]    HYSTERECTOMY ROBOTIC  10/11/2011    Performed by MOLINA LOGAN at SURGERY [facility]    PRIMARY C SECTION      x  2    TONSILLECTOMY AND ADENOIDECTOMY      age 7       Exam:   /76 (BP Location: Left arm, Patient Position: Sitting, BP Cuff Size: Large adult)   Pulse 80   Temp (!) 35.6 °C (96.1 °F)   Resp 16   Ht 1.676 m (5' 6\")   Wt 95.7 kg (211 lb)   SpO2 96%  Body mass index is 34.06 kg/m².    Hearing excellent.    Dentition good  Neck:  no thyromegaly appreciated  Alert, " oriented in no acute distress.  Eye contact is good, speech goal directed, affect calm    Assessment and Plan. The following treatment and monitoring plan is recommended:    1. Medicare annual wellness visit, subsequent        2. Decreased GFR  Basic Metabolic Panel      3. Iron deficiency anemia, unspecified iron deficiency anemia type  IRON/TOTAL IRON BIND      4. Type 2 diabetes mellitus without complication, without long-term current use of insulin (HCC)  MICROALBUMIN CREAT RATIO URINE    Diabetic Monofilament LE Exam      5. Anxiety        6. Essential hypertension        7. Family history of ovarian cancer        8. Gastroesophageal reflux disease, unspecified whether esophagitis present        9. Generalized headaches - tylenol migraine works .  present since 50's.        10. History of bladder cancer        11. History of hysterectomy        12. History of primary malignant neoplasm of urinary bladder        13. History of smoking (quit 2010 - total of 30 yrs)        14. Other hyperlipidemia        15. Insomnia, unspecified type        16. Chronic low back pain, unspecified back pain laterality, unspecified whether sciatica present        17. Lung nodule - 5 mm 8/2020        18. Obesity (BMI 35.0-39.9 without comorbidity) (HCC)        19. Right hip pain        20. Status post left hip replacement        21. Thyroid nodule  US-THYROID        Overall Fadumo is doing pretty well.  She is bothered by anxiety.  We discussed changing her as needed Atarax to Vistaril but taking it only in the evening when she is not going to get in her car for at least 8 hours.  She understands not to mix Vistaril with alcohol.  She will send me a message on HipChat regarding how she responds to Vistaril.  We discussed trying to taper off of her Tylenol with codeine for pain and if she is able to get off of an opiate, she can have Xanax again for as needed anxiety if hydroxyzine is not helpful.  She does not take something for  anxiety daily and prefers to refrain from daily medication for anxiety.    Also reviewed CT cardiac score and CT of her lung from 2021 and 2023.  Recommend specific thyroid ultrasound related to comment on thyroid nodule.  Otherwise lung nodules are stable, recheck 3 years.  She has quit smoking.    Recommend a follow-up BMP and CBC in relationship to slightly decreased GFR and H/H on labs in November.  She is taking an iron supplement.  She eats low salt and drinks a lot of water.  Her blood pressure is well-controlled.    Recommend repeat A1c in about 3 months.  Encouraged her to continue on a diabetic diet and exercise.  Encouraged her to make sure to follow-up with her eye doctor yearly.  Discussed good footcare.  Discussed long-term repercussions of uncontrolled diabetes, which is not her case as her last A1c was at 6.5.  She will continue on metformin.  She has been unable to obtain Trulicity from her pharmacy and we will see if Mounjaro is covered for her.  She will speak with her pharmacist and insurance regarding any other GLP-1 that is covered for her if we cannot get her hands on Trulicity for her.  Discussed the similarity between Mounjaro, Trulicity and Ozempic.    Has a follow-up with her urologist today regarding her history of bladder cancer.  Heartburn is intermittent.  Encouraged her to cut out Excedrin Migraine because of the aspirin content.  May use up to 1000 mg of Tylenol 3 times daily for pain or headaches.    Follow-up 3 months.    Services suggested: No services needed at this time  Health Care Screening: Age-appropriate preventive services recommended by USPTF and ACIP covered by Medicare were discussed today. Services ordered if indicated and agreed upon by the patient.  Referrals offered: Community-based lifestyle interventions to reduce health risks and promote self-management and wellness, fall prevention, nutrition, physical activity, tobacco-use cessation, weight loss, and mental  health services as per orders if indicated.    Discussion today about general wellness and lifestyle habits:    Prevent falls and reduce trip hazards; Cautioned about securing or removing rugs.  Have a working fire alarm and carbon monoxide detector;   Engage in regular physical activity and social activities     Follow-up: 3 mo

## 2024-02-02 LAB
CREAT UR-MCNC: 45.85 MG/DL
MICROALBUMIN UR-MCNC: <1.2 MG/DL
MICROALBUMIN/CREAT UR: NORMAL MG/G (ref 0–30)

## 2024-02-06 ENCOUNTER — TELEPHONE (OUTPATIENT)
Dept: MEDICAL GROUP | Facility: LAB | Age: 69
End: 2024-02-06
Payer: MEDICARE

## 2024-02-06 NOTE — TELEPHONE ENCOUNTER
DOCUMENTATION OF PAR STATUS:    1. Name of Medication & Dose:  Drug    Mounjaro 7.5MG/0.5ML pen-injectors      2. Name of Prescription Coverage Company & phone #: cover my meds  Key: [id number]  3. Date Prior Auth Submitted: 2/6/24    4. What information was given to obtain insurance decision? Ov notes faxed    Fadumo Tai Key: [id number] - PA Case ID: [id number] - Rx #: [id number]  Need help? Call us at [phone]  Outcome   Approvedtoday  CaseId:[id number];Status:Approved;Review Type:Prior Auth;Coverage Start Date:01/07/2024;Coverage End Date:12/31/2099;  Drug    Mounjaro 7.5MG/0.5ML pen-injectors   Form    [employer] Electronic PA Form (2017 NCPDP)           Original Claim Info    75

## 2024-02-07 DIAGNOSIS — I10 ESSENTIAL HYPERTENSION: ICD-10-CM

## 2024-02-07 DIAGNOSIS — E78.49 OTHER HYPERLIPIDEMIA: ICD-10-CM

## 2024-02-07 DIAGNOSIS — R73.01 IMPAIRED FASTING GLUCOSE: ICD-10-CM

## 2024-02-07 DIAGNOSIS — R60.0 BILATERAL EDEMA OF LOWER EXTREMITY: ICD-10-CM

## 2024-02-07 RX ORDER — HYDROCHLOROTHIAZIDE 25 MG/1
25 TABLET ORAL DAILY
Qty: 90 TABLET | Refills: 3 | Status: SHIPPED | OUTPATIENT
Start: 2024-02-07

## 2024-02-07 RX ORDER — LISINOPRIL 20 MG/1
TABLET ORAL
Qty: 90 TABLET | Refills: 3 | Status: SHIPPED | OUTPATIENT
Start: 2024-02-07

## 2024-02-07 RX ORDER — METFORMIN HYDROCHLORIDE 500 MG/1
1000 TABLET, EXTENDED RELEASE ORAL DAILY
Qty: 180 TABLET | Refills: 3 | Status: SHIPPED | OUTPATIENT
Start: 2024-02-07

## 2024-02-07 RX ORDER — ATORVASTATIN CALCIUM 40 MG/1
TABLET, FILM COATED ORAL
Qty: 90 TABLET | Refills: 3 | Status: SHIPPED | OUTPATIENT
Start: 2024-02-07

## 2024-02-08 ENCOUNTER — HOSPITAL ENCOUNTER (OUTPATIENT)
Dept: RADIOLOGY | Facility: MEDICAL CENTER | Age: 69
End: 2024-02-08
Attending: NURSE PRACTITIONER
Payer: MEDICARE

## 2024-02-08 DIAGNOSIS — R92.30 DENSE BREAST TISSUE: ICD-10-CM

## 2024-02-08 PROCEDURE — 76641 ULTRASOUND BREAST COMPLETE: CPT

## 2024-03-12 RX ORDER — HYDROXYZINE PAMOATE 25 MG/1
25 CAPSULE ORAL 3 TIMES DAILY PRN
Qty: 30 CAPSULE | Refills: 0 | Status: SHIPPED | OUTPATIENT
Start: 2024-03-12

## 2024-03-12 NOTE — TELEPHONE ENCOUNTER
Received request via: Pharmacy    Was the patient seen in the last year in this department? Yes  2/1/24  Does the patient have an active prescription (recently filled or refills available) for medication(s) requested? No    Pharmacy Name: [facility]    Does the patient have [facility] and need 100 day supply (blood pressure, diabetes and cholesterol meds only)? Medication is not for cholesterol, blood pressure or diabetes

## 2024-04-17 ENCOUNTER — PATIENT MESSAGE (OUTPATIENT)
Dept: MEDICAL GROUP | Facility: LAB | Age: 69
End: 2024-04-17
Payer: MEDICARE

## 2024-04-18 RX ORDER — TIRZEPATIDE 10 MG/.5ML
10 INJECTION, SOLUTION SUBCUTANEOUS
Qty: 2 ML | Refills: 3 | Status: SHIPPED | OUTPATIENT
Start: 2024-04-18

## 2024-04-24 RX ORDER — TIRZEPATIDE 7.5 MG/.5ML
7.5 INJECTION, SOLUTION SUBCUTANEOUS
Qty: 2 ML | Refills: 5 | Status: SHIPPED | OUTPATIENT
Start: 2024-04-24

## 2024-04-24 NOTE — TELEPHONE ENCOUNTER
Mounjaro  (Newest Message First)  View All Conversations on this Encounter  TrishJuanita Taylorsade  LEIGH Peter Gar Ma (supporting Ursula Mccord, A.P.N.)29 minutes ago (2:37 PM)       I don't know???? But I think so       You  Trish [id number] minutes ago (2:35 PM)       Will you need a new prescription sent to them for the 7.5?       Fadumo Taylorsade Gar Ma (supporting Ursula Mccord, A.P.N.)41 minutes ago (2:25 PM)       [facility]       Trish Cryhenry Gar Ma (supporting Ursula Mccord, A.P.N.)41 minutes ago (2:25 PM)       [facility] will have 7.5 on April 30th. 10 has no date of avaliable. So guess stay at 7.5???.?

## 2024-04-27 NOTE — OP THERAPY DAILY TREATMENT
Outpatient Physical Therapy  DAILY TREATMENT     [facility] [place]  [address], [address]  [place] [address]  Phone:  [phone]  Fax:  [phone]    Date: 10/24/2018    Patient: Fadumo Adair  YOB: 1955  MRN: [id number]     Time Calculation  Start time: 0822  Stop time: 0900 Time Calculation (min): 38 minutes     Chief Complaint: Back Problem and Hip Problem    Visit #: 2    SUBJECTIVE:  Patient reports no real change since her eval. Notes she had been doing her HEP.     OBJECTIVE:  Current objective measures:   Strength:       Left Hip   Planes of Motion   Abduction: 4  Adduction: 5     Right Hip   Planes of Motion   Abduction: 5  Adduction: 5          Therapeutic Exercises (CPT 56162):     1. Bike, x8min    2. Press ups , x10, increase; slightly worse    3. 1/2 kneel hip flexor stretch, [id number]    4. Clams, x fatigue B    5. Seated hip abd L2, x fatigue B    6. Basic TrA, x10min    7. Basic TrA LE lifts, x30    Therapeutic Treatments and Modalities:     1. Neuromuscular Re-education (CPT 04520), Coner balance exercise with NBOS to tandem    Time-based treatments/modalities:  Therapeutic exercise minutes (CPT 54350): 30 minutes  Neuromusc re-ed, balance, coor, post minutes (CPT 79299): 8 minutes       Pain rating before treatment: 1  Pain rating after treatment: 0    ASSESSMENT:   Response to treatment: Patient responded well to therapy with an overall increase in fatigue with no increase in pain.     PLAN/RECOMMENDATIONS:   Plan for treatment: therapy treatment to continue next visit.  Planned interventions for next visit: manual therapy (CPT 18943), neuromuscular re-education (CPT 77022) and therapeutic exercise (CPT 60629).       none rhino entero/contact/droplet

## 2024-04-30 ENCOUNTER — HOSPITAL ENCOUNTER (OUTPATIENT)
Dept: LAB | Facility: MEDICAL CENTER | Age: 69
End: 2024-04-30
Attending: NURSE PRACTITIONER
Payer: MEDICARE

## 2024-04-30 DIAGNOSIS — R94.4 DECREASED GFR: ICD-10-CM

## 2024-04-30 DIAGNOSIS — R79.89 ABNORMAL CBC: ICD-10-CM

## 2024-04-30 DIAGNOSIS — D50.9 IRON DEFICIENCY ANEMIA, UNSPECIFIED IRON DEFICIENCY ANEMIA TYPE: ICD-10-CM

## 2024-04-30 LAB
ANION GAP SERPL CALC-SCNC: 13 MMOL/L (ref 7–16)
APPEARANCE UR: CLEAR
BACTERIA #/AREA URNS HPF: ABNORMAL /HPF
BASOPHILS # BLD AUTO: 0.6 % (ref 0–1.8)
BASOPHILS # BLD: 0.05 K/UL (ref 0–0.12)
BILIRUB UR QL STRIP.AUTO: NEGATIVE
BUN SERPL-MCNC: 29 MG/DL (ref 8–22)
CALCIUM SERPL-MCNC: 9.5 MG/DL (ref 8.5–10.5)
CHLORIDE SERPL-SCNC: 101 MMOL/L (ref 96–112)
CO2 SERPL-SCNC: 23 MMOL/L (ref 20–33)
COLOR UR: YELLOW
CREAT SERPL-MCNC: 1.65 MG/DL (ref 0.5–1.4)
EOSINOPHIL # BLD AUTO: 0.14 K/UL (ref 0–0.51)
EOSINOPHIL NFR BLD: 1.6 % (ref 0–6.9)
EPI CELLS #/AREA URNS HPF: ABNORMAL /HPF
ERYTHROCYTE [DISTWIDTH] IN BLOOD BY AUTOMATED COUNT: 47.2 FL (ref 35.9–50)
GFR SERPLBLD CREATININE-BSD FMLA CKD-EPI: 34 ML/MIN/1.73 M 2
GLUCOSE SERPL-MCNC: 102 MG/DL (ref 65–99)
GLUCOSE UR STRIP.AUTO-MCNC: NEGATIVE MG/DL
HCT VFR BLD AUTO: 32.5 % (ref 37–47)
HGB BLD-MCNC: 10.5 G/DL (ref 12–16)
HYALINE CASTS #/AREA URNS LPF: ABNORMAL /LPF
IMM GRANULOCYTES # BLD AUTO: 0.04 K/UL (ref 0–0.11)
IMM GRANULOCYTES NFR BLD AUTO: 0.5 % (ref 0–0.9)
IRON SATN MFR SERPL: 20 % (ref 15–55)
IRON SERPL-MCNC: 53 UG/DL (ref 40–170)
KETONES UR STRIP.AUTO-MCNC: ABNORMAL MG/DL
LEUKOCYTE ESTERASE UR QL STRIP.AUTO: ABNORMAL
LYMPHOCYTES # BLD AUTO: 2.31 K/UL (ref 1–4.8)
LYMPHOCYTES NFR BLD: 26.4 % (ref 22–41)
MCH RBC QN AUTO: 28.8 PG (ref 27–33)
MCHC RBC AUTO-ENTMCNC: 32.3 G/DL (ref 32.2–35.5)
MCV RBC AUTO: 89 FL (ref 81.4–97.8)
MICRO URNS: ABNORMAL
MONOCYTES # BLD AUTO: 0.6 K/UL (ref 0–0.85)
MONOCYTES NFR BLD AUTO: 6.9 % (ref 0–13.4)
MUCOUS THREADS #/AREA URNS HPF: ABNORMAL /HPF
NEUTROPHILS # BLD AUTO: 5.61 K/UL (ref 1.82–7.42)
NEUTROPHILS NFR BLD: 64 % (ref 44–72)
NITRITE UR QL STRIP.AUTO: NEGATIVE
NRBC # BLD AUTO: 0 K/UL
NRBC BLD-RTO: 0 /100 WBC (ref 0–0.2)
PH UR STRIP.AUTO: 5.5 [PH] (ref 5–8)
PLATELET # BLD AUTO: 239 K/UL (ref 164–446)
PMV BLD AUTO: 11.3 FL (ref 9–12.9)
POTASSIUM SERPL-SCNC: 4.8 MMOL/L (ref 3.6–5.5)
PROT UR QL STRIP: 30 MG/DL
RBC # BLD AUTO: 3.65 M/UL (ref 4.2–5.4)
RBC # URNS HPF: ABNORMAL /HPF
RBC UR QL AUTO: NEGATIVE
SODIUM SERPL-SCNC: 137 MMOL/L (ref 135–145)
SP GR UR STRIP.AUTO: 1.02
TIBC SERPL-MCNC: 261 UG/DL (ref 250–450)
UIBC SERPL-MCNC: 208 UG/DL (ref 110–370)
UROBILINOGEN UR STRIP.AUTO-MCNC: 0.2 MG/DL
WBC # BLD AUTO: 8.8 K/UL (ref 4.8–10.8)
WBC #/AREA URNS HPF: ABNORMAL /HPF

## 2024-05-02 ENCOUNTER — OFFICE VISIT (OUTPATIENT)
Dept: MEDICAL GROUP | Facility: LAB | Age: 69
End: 2024-05-02
Payer: MEDICARE

## 2024-05-02 VITALS
BODY MASS INDEX: 31.98 KG/M2 | DIASTOLIC BLOOD PRESSURE: 70 MMHG | RESPIRATION RATE: 16 BRPM | OXYGEN SATURATION: 95 % | WEIGHT: 199 LBS | TEMPERATURE: 96 F | SYSTOLIC BLOOD PRESSURE: 118 MMHG | HEIGHT: 66 IN | HEART RATE: 82 BPM

## 2024-05-02 DIAGNOSIS — E04.1 THYROID NODULE: ICD-10-CM

## 2024-05-02 DIAGNOSIS — N18.32 STAGE 3B CHRONIC KIDNEY DISEASE: ICD-10-CM

## 2024-05-02 DIAGNOSIS — F41.9 ANXIETY: ICD-10-CM

## 2024-05-02 DIAGNOSIS — M15.9 PRIMARY OSTEOARTHRITIS INVOLVING MULTIPLE JOINTS: ICD-10-CM

## 2024-05-02 DIAGNOSIS — E11.9 TYPE 2 DIABETES MELLITUS WITHOUT COMPLICATION, WITHOUT LONG-TERM CURRENT USE OF INSULIN (HCC): ICD-10-CM

## 2024-05-02 DIAGNOSIS — I10 ESSENTIAL HYPERTENSION: ICD-10-CM

## 2024-05-02 DIAGNOSIS — R73.01 IMPAIRED FASTING GLUCOSE: ICD-10-CM

## 2024-05-02 PROBLEM — E66.9 OBESITY (BMI 35.0-39.9 WITHOUT COMORBIDITY): Status: RESOLVED | Noted: 2018-02-27 | Resolved: 2024-05-02

## 2024-05-02 PROBLEM — R94.4 DECREASED GFR: Status: RESOLVED | Noted: 2018-08-21 | Resolved: 2024-05-02

## 2024-05-02 PROBLEM — M15.0 PRIMARY OSTEOARTHRITIS INVOLVING MULTIPLE JOINTS: Status: ACTIVE | Noted: 2024-05-02

## 2024-05-02 LAB
HBA1C MFR BLD: 5.6 % (ref ?–5.8)
POCT INT CON NEG: NEGATIVE
POCT INT CON POS: POSITIVE

## 2024-05-02 PROCEDURE — 83036 HEMOGLOBIN GLYCOSYLATED A1C: CPT | Performed by: NURSE PRACTITIONER

## 2024-05-02 PROCEDURE — 99215 OFFICE O/P EST HI 40 MIN: CPT | Performed by: NURSE PRACTITIONER

## 2024-05-02 PROCEDURE — 3078F DIAST BP <80 MM HG: CPT | Performed by: NURSE PRACTITIONER

## 2024-05-02 PROCEDURE — 3074F SYST BP LT 130 MM HG: CPT | Performed by: NURSE PRACTITIONER

## 2024-05-02 PROCEDURE — 1170F FXNL STATUS ASSESSED: CPT | Performed by: NURSE PRACTITIONER

## 2024-05-02 RX ORDER — CELECOXIB 200 MG/1
200 CAPSULE ORAL DAILY
Qty: 90 CAPSULE | Refills: 3
Start: 2024-05-02

## 2024-05-02 RX ORDER — QUETIAPINE FUMARATE 100 MG/1
100 TABLET, FILM COATED ORAL EVERY EVENING
Qty: 30 TABLET | Refills: 0 | Status: SHIPPED | OUTPATIENT
Start: 2024-05-02

## 2024-05-02 RX ORDER — METFORMIN HYDROCHLORIDE 500 MG/1
500 TABLET, EXTENDED RELEASE ORAL DAILY
Qty: 90 TABLET | Refills: 3
Start: 2024-05-02

## 2024-05-02 RX ORDER — ALPRAZOLAM 0.25 MG/1
0.25 TABLET ORAL
Qty: 30 TABLET | Refills: 2 | Status: SHIPPED | OUTPATIENT
Start: 2024-05-02 | End: 2024-06-01

## 2024-05-02 ASSESSMENT — FIBROSIS 4 INDEX: FIB4 SCORE: 1.08

## 2024-05-02 NOTE — PROGRESS NOTES
Verbal consent was acquired by the patient to use [employer] ambient listening note generation during this visit Yes      Subjective   Fadumo Adair is a 68 y.o. female who presents for lab / weight / DM f/u.  History of Present Illness  The patient is a 68-year-old established female here to follow up on type 2 diabetes with an A1c in the office today. She has been struggling to obtain a GLP-1 agonist because of supply.  She is waiting on Mounjaro 7.5 mg from [facility].  She was able to use Mounjaro in the month of late February through early March.  Her weight over the past 3 months has dropped 12 pounds and since last 10/2023, 18 pounds. I discussed her anxiety at her visit in 02/2024 and did a trial of changing Atarax to Vistaril as well as tapering off Tylenol with Codeine for her pain as she preferred to have alprazolam again if Vistaril was not working.  She finds Vistaril a little bit more helpful than hydroxyzine but she would really like to go back to Xanax.  She takes quetiapine 50 mg prior to bedtime and typically sleeps well until about 2 or 3 in the morning when she wakes up and has difficulty falling back asleep, at which point she takes the Vistaril at 2 AM but for she is unable to take it because it causes daytime grogginess.  She is completely off of zolpidem.    Unfortunately she newly has a GFR of 34, creatinine of 1.65, last GFR was 59 and as low as 47 in 2018.  She denies any recent changes in her life such as surgeries, illnesses, recent bouts of dehydration.  Only new medication to her is Vistaril.  She does take Celebrex, metformin and hydrochlorothiazide which could be contributing.  She does not drink water in the morning, only 2 cups of coffee first but then hydrates well throughout the day.  She does have a history of bladder cancer, diagnosed 2019 and her last follow-up with urology was in February.  She denies hematuria.  She does not take any other NSAIDs such as  "ibuprofen, Motrin, Aleve or Advil.  She tries to follow a low-salt diet.    Thyroid nodule: Seen on CT cardiac score.  She is scheduled for a thyroid ultrasound next Tuesday..     Review of Systems  Negative except for HPI  Objective   /70 (BP Location: Right arm, Patient Position: Sitting, BP Cuff Size: Large adult)   Pulse 82   Temp (!) 35.6 °C (96 °F)   Resp 16   Ht 1.676 m (5' 6\")   Wt 90.3 kg (199 lb)   SpO2 95%   Physical Exam  Gen. appears healthy in no distress   Skin appropriate for sex and age   Neck trachea is midline  Lungs unlabored breathing  Heart regular rate  Neuro gait and station normal   Psych appropriate, calm, interactive, well-groomed         Assessment & Plan  1. Type 2 diabetes.  A1c excellent now down to 5.6.  Reduce metformin to once per day.  She is still hoping to receive Mounjaro through [facility] and will follow-up with [facility] today.  We could certainly switch this to [facility] if they have Mounjaro available.  Recheck A1c 6 months.  Continue with diabetic diet and exercise.    2. Chronic kidney disease with acute worsening:    I discontinued hydrochlorothiazide, continue lisinopril for now but may need to change to amlodipine.  We will briefly discontinue Celebrex for the next week and then she may restart it at 1 pill/day at 200 mg if needed for significant arthritis discomfort.  She will go down to 1 metformin per day, 500 mg.  She is avoiding all other NSAIDs such as ibuprofen, Motrin, Aleve and Advil.  Discontinue hydroxyzine.  She will follow a low-salt diet and stay well-hydrated.  Encouraged her to drink a large glass of water prior to her coffee in the morning.  We will recheck a BMP in 7 days.  I did place a referral to nephrology in case her BMP does not improve next week with all of these changes and increased hydration.    3. Anxiety.  She has completely discontinued any opiate use including codeine.  She is off of zolpidem.  I encouraged " her to increase quetiapine to 75 mg tonight in hopes of avoiding the 2 to 4 AM wake up and if this is not successful she may go up further to 100 mg.  Discussed side effects of quetiapine.  She was given a small prescription of alprazolam to take as needed for panic or anxiety attacks as I discontinued antihistamine therapy of hydroxyzine because of kidney issues.  She understands that she should not take Xanax within 6 hours of driving a car, operating machinery or drinking alcohol.  She understands the chemically dependent nature of Xanax.    In prescribing controlled substances to this patient, I certify that I have obtained and reviewed the medical history of Fadumo Adair. I have also made a good janet effort to obtain applicable records from other providers who have treated the patient and records did not demonstrate any increased risk of substance abuse that would prevent me from prescribing controlled substances.     I have conducted a physical exam and documented it. I have reviewed Ms. Adair’s prescription history as maintained by the [place] Prescription Monitoring Program.     I have assessed the patient’s risk for abuse, dependency, and addiction using the validated Opioid Risk Tool available at https://www.mdcalc.com/sxiidf-xhbp-ywgu-ort-narcotic-abuse.     Given the above, I believe the benefits of controlled substance therapy outweigh the risks. The reasons for prescribing controlled substances include non-narcotic, oral analgesic alternatives have been inadequate for pain control. Accordingly, I have discussed the risk and benefits, treatment plan, and alternative therapies with the patient.          Total face to face time 40 minutes of which over 50% of this visit is spent in counseling, education and outlining a plan of treatment and coordination of care for the above conditions. This included but was not limited to discussion of medication options and potential risks related to the  medications, referral and specialty care options. All patient questions were answered    Please note that this dictation was created using voice recognition software. I have made every reasonable attempt to correct obvious errors, but I expect that there are errors of grammar and possibly content that I did not discover before finalizing the note.

## 2024-05-03 ENCOUNTER — PATIENT MESSAGE (OUTPATIENT)
Dept: MEDICAL GROUP | Facility: LAB | Age: 69
End: 2024-05-03
Payer: MEDICARE

## 2024-05-06 ENCOUNTER — PATIENT MESSAGE (OUTPATIENT)
Dept: MEDICAL GROUP | Facility: LAB | Age: 69
End: 2024-05-06
Payer: MEDICARE

## 2024-05-06 RX ORDER — TIRZEPATIDE 7.5 MG/.5ML
7.5 INJECTION, SOLUTION SUBCUTANEOUS
Qty: 2 ML | Refills: 5 | Status: SHIPPED | OUTPATIENT
Start: 2024-05-06 | End: 2024-05-14

## 2024-05-09 RX ORDER — SEMAGLUTIDE 0.68 MG/ML
0.5 INJECTION, SOLUTION SUBCUTANEOUS
Qty: 3 ML | Refills: 4 | Status: SHIPPED | OUTPATIENT
Start: 2024-05-09 | End: 2024-05-16 | Stop reason: SDUPTHER

## 2024-05-13 ENCOUNTER — HOSPITAL ENCOUNTER (OUTPATIENT)
Dept: LAB | Facility: MEDICAL CENTER | Age: 69
End: 2024-05-13
Attending: NURSE PRACTITIONER
Payer: MEDICARE

## 2024-05-13 DIAGNOSIS — N18.32 STAGE 3B CHRONIC KIDNEY DISEASE: ICD-10-CM

## 2024-05-13 LAB
ANION GAP SERPL CALC-SCNC: 13 MMOL/L (ref 7–16)
BUN SERPL-MCNC: 17 MG/DL (ref 8–22)
CALCIUM SERPL-MCNC: 9.8 MG/DL (ref 8.5–10.5)
CHLORIDE SERPL-SCNC: 106 MMOL/L (ref 96–112)
CO2 SERPL-SCNC: 24 MMOL/L (ref 20–33)
CREAT SERPL-MCNC: 0.95 MG/DL (ref 0.5–1.4)
GFR SERPLBLD CREATININE-BSD FMLA CKD-EPI: 66 ML/MIN/1.73 M 2
GLUCOSE SERPL-MCNC: 101 MG/DL (ref 65–99)
POTASSIUM SERPL-SCNC: 4.8 MMOL/L (ref 3.6–5.5)
SODIUM SERPL-SCNC: 143 MMOL/L (ref 135–145)

## 2024-05-14 ENCOUNTER — OFFICE VISIT (OUTPATIENT)
Dept: NEPHROLOGY | Facility: MEDICAL CENTER | Age: 69
End: 2024-05-14
Attending: NURSE PRACTITIONER
Payer: MEDICARE

## 2024-05-14 VITALS
DIASTOLIC BLOOD PRESSURE: 72 MMHG | WEIGHT: 201 LBS | HEART RATE: 89 BPM | SYSTOLIC BLOOD PRESSURE: 108 MMHG | BODY MASS INDEX: 33.49 KG/M2 | TEMPERATURE: 98 F | OXYGEN SATURATION: 96 % | HEIGHT: 65 IN

## 2024-05-14 DIAGNOSIS — M15.9 PRIMARY OSTEOARTHRITIS INVOLVING MULTIPLE JOINTS: ICD-10-CM

## 2024-05-14 DIAGNOSIS — N17.9 AKI (ACUTE KIDNEY INJURY) (HCC): ICD-10-CM

## 2024-05-14 DIAGNOSIS — I10 ESSENTIAL HYPERTENSION: ICD-10-CM

## 2024-05-14 PROCEDURE — 1170F FXNL STATUS ASSESSED: CPT | Performed by: INTERNAL MEDICINE

## 2024-05-14 PROCEDURE — 99204 OFFICE O/P NEW MOD 45 MIN: CPT | Performed by: INTERNAL MEDICINE

## 2024-05-14 PROCEDURE — 3078F DIAST BP <80 MM HG: CPT | Performed by: INTERNAL MEDICINE

## 2024-05-14 PROCEDURE — 3074F SYST BP LT 130 MM HG: CPT | Performed by: INTERNAL MEDICINE

## 2024-05-14 RX ORDER — TIRZEPATIDE 10 MG/.5ML
10 INJECTION, SOLUTION SUBCUTANEOUS
Qty: 6 ML | Refills: 3 | Status: SHIPPED | OUTPATIENT
Start: 2024-05-14 | End: 2024-05-15 | Stop reason: SDUPTHER

## 2024-05-14 ASSESSMENT — ENCOUNTER SYMPTOMS
HYPERTENSION: 1
CHILLS: 0
FEVER: 0
NAUSEA: 0
COUGH: 0
SHORTNESS OF BREATH: 0
VOMITING: 0

## 2024-05-14 ASSESSMENT — FIBROSIS 4 INDEX: FIB4 SCORE: 1.08

## 2024-05-14 NOTE — PROGRESS NOTES
"Subjective     Fadumo Adair is a 68 y.o. female who presents with Chronic Kidney Disease and Hypertension            The patient is a pleasant 68-year-old lady with a past medical history significant for longstanding hypertension, presenting joint disease.  She was on chronic diuretics and Celebrex use, found to have acute kidney injury.  Patient has no hematuria or dysuria.  No recent exposure of IV contrast.    Chronic Kidney Disease  This is a new problem. The current episode started 1 to 4 weeks ago. The problem occurs intermittently. The problem has been resolved. Pertinent negatives include no chest pain, chills, coughing, fever, nausea, urinary symptoms or vomiting. Exacerbated by: Diuretics.   Hypertension  This is a chronic problem. The current episode started more than 1 year ago. The problem is unchanged. The problem is controlled. Pertinent negatives include no chest pain, malaise/fatigue, peripheral edema or shortness of breath. Risk factors for coronary artery disease include obesity and post-menopausal state. Past treatments include ACE inhibitors. The current treatment provides significant improvement. There are no compliance problems.        Review of Systems   Constitutional:  Negative for chills, fever and malaise/fatigue.   Respiratory:  Negative for cough and shortness of breath.    Cardiovascular:  Negative for chest pain and leg swelling.   Gastrointestinal:  Negative for nausea and vomiting.   Genitourinary:  Negative for dysuria, frequency and urgency.              Objective     /72 (BP Location: Right arm, Patient Position: Sitting, BP Cuff Size: Adult)   Pulse 89   Temp 36.7 °C (98 °F) (Temporal)   Ht 1.638 m (5' 4.5\")   Wt 91.2 kg (201 lb)   LMP 09/10/2011   SpO2 96%   BMI 33.97 kg/m²      Physical Exam  Vitals and nursing note reviewed.   Constitutional:       General: She is awake. She is not in acute distress.     Appearance: She is well-developed. She is not " ill-appearing or diaphoretic.   HENT:      Head: Normocephalic and atraumatic.      Right Ear: External ear normal.      Left Ear: External ear normal.      Nose: Nose normal. No rhinorrhea.      Mouth/Throat:      Pharynx: No oropharyngeal exudate or posterior oropharyngeal erythema.   Eyes:      General: No scleral icterus.        Right eye: No discharge.         Left eye: No discharge.      Conjunctiva/sclera: Conjunctivae normal.   Neck:      Vascular: No carotid bruit.   Cardiovascular:      Rate and Rhythm: Normal rate and regular rhythm.      Heart sounds: No murmur heard.  Pulmonary:      Effort: Pulmonary effort is normal. No respiratory distress.      Breath sounds: Normal breath sounds.   Abdominal:      General: Abdomen is flat. There is no distension.      Palpations: Abdomen is soft. There is no mass.   Musculoskeletal:         General: No tenderness.      Cervical back: No rigidity. No muscular tenderness.      Right lower leg: No edema.      Left lower leg: No edema.   Skin:     General: Skin is warm and dry.      Coloration: Skin is not jaundiced.   Neurological:      General: No focal deficit present.      Mental Status: She is alert and oriented to person, place, and time. Mental status is at baseline.   Psychiatric:         Mood and Affect: Mood normal.         Behavior: Behavior normal.         Thought Content: Thought content normal.       Past Medical History:   Diagnosis Date    Anemia     Arrhythmia     mild, pt not sure problem, does not see cardiologist     Arthritis     hip, knees, back    Cancer (HCC)     bladder    Heart burn     High cholesterol     HTN     Hyperlipemia     Indigestion     Low back pain 09-28-11    hips, and knees    Urinary bladder disorder      Social History     Socioeconomic History    Marital status:      Spouse name: Not on file    Number of children: Not on file    Years of education: Not on file    Highest education level: Not on file   Occupational  History    Not on file   Tobacco Use    Smoking status: Former     Current packs/day: 0.00     Average packs/day: 0.5 packs/day for 30.0 years (15.0 ttl pk-yrs)     Types: Cigarettes     Start date: 1981     Quit date: 2011     Years since quittin.2    Smokeless tobacco: Never   Vaping Use    Vaping Use: Never used   Substance and Sexual Activity    Alcohol use: Yes     Alcohol/week: 2.4 oz     Types: 4 Standard drinks or equivalent per week     Comment: 4 a week     Drug use: Not Currently    Sexual activity: Not on file   Other Topics Concern     Service No    Blood Transfusions No    Caffeine Concern No    Occupational Exposure No    Hobby Hazards No    Sleep Concern Yes    Stress Concern No    Weight Concern Yes    Special Diet No    Back Care No    Exercise Yes    Bike Helmet Yes    Seat Belt Yes    Self-Exams No   Social History Narrative    Not on file     Social Determinants of Health     Financial Resource Strain: Not on file   Food Insecurity: Not on file   Transportation Needs: Not on file   Physical Activity: Not on file   Stress: Not on file   Social Connections: Not on file   Intimate Partner Violence: Not on file   Housing Stability: Not on file     Family History   Problem Relation Age of Onset    Cancer Mother     Heart Disease Father      Recent Labs     23  0907 24  1536 24  1210   ALBUMIN 4.7  --   --    HDL 44  --   --    TRIGLYCERIDE 153*  --   --    SODIUM 140 137 143   POTASSIUM 4.6 4.8 4.8   CHLORIDE 101 101 106   CO2 27 23 24   BUN 32* 29* 17   CREATININE 1.05 1.65* 0.95                             Assessment & Plan        1. HILDA (acute kidney injury) (HCC)  Etiology is most likely prerenal component secondary to aggressive diuresis while patient was on Celebrex  Kidney function has improved after discontinuing diuretics and Celebrex  Recommend to continue to avoid Celebrex or NSAIDs  Can use diuretics on as-needed basis  No uremic symptoms  Renal dose  of medication  Avoid nephrotoxins  Recheck labs in 4 to 6 months  Patient was advised to call us if symptoms worsen      2. Essential hypertension  Controlled  Continue ACE inhibitor  Continue low-sodium diet      3. Primary osteoarthritis involving multiple joints  Avoid nephrotoxins like NSAIDs or Snyder 2 inhibitor

## 2024-05-15 RX ORDER — TIRZEPATIDE 10 MG/.5ML
10 INJECTION, SOLUTION SUBCUTANEOUS
Qty: 6 ML | Refills: 3 | Status: SHIPPED | OUTPATIENT
Start: 2024-05-15 | End: 2024-05-17

## 2024-05-15 NOTE — TELEPHONE ENCOUNTER
Can you send to local pharmacy or did you want to send ozempic to [facility]? Sounds like they don't have it yet but if the prescription is sent then she will be in line to get it when it comes in?

## 2024-05-16 ENCOUNTER — PATIENT MESSAGE (OUTPATIENT)
Dept: MEDICAL GROUP | Facility: LAB | Age: 69
End: 2024-05-16
Payer: MEDICARE

## 2024-05-17 RX ORDER — SEMAGLUTIDE 0.68 MG/ML
0.5 INJECTION, SOLUTION SUBCUTANEOUS
Qty: 3 ML | Refills: 4 | Status: SHIPPED | OUTPATIENT
Start: 2024-05-17

## 2024-05-20 RX ORDER — QUETIAPINE FUMARATE 25 MG/1
TABLET, FILM COATED ORAL
Qty: 60 TABLET | Refills: 3 | Status: SHIPPED | OUTPATIENT
Start: 2024-05-20

## 2024-05-21 ENCOUNTER — TELEPHONE (OUTPATIENT)
Dept: MEDICAL GROUP | Facility: LAB | Age: 69
End: 2024-05-21
Payer: MEDICARE

## 2024-05-21 NOTE — TELEPHONE ENCOUNTER
DOCUMENTATION OF PAR STATUS:    1. Name of Medication & Dose:  Semaglutide,0.25 or 0.5MG/DOS, (OZEMPIC, 0.25 OR 0.5 MG/DOSE,) 2 MG/3ML Solution Pen-injector     2. Name of Prescription Coverage Company & phone #: cover my meds     3. Date Prior Auth Submitted: [id number]    4. What information was given to obtain insurance decision? Ov notes faxed    Status:Approved;Review Type:Prior Auth;Coverage Start Date:04/21/2024;Coverage End Date:12/31/2099;. Authorization Expiration Date: December 31, 2099.

## 2024-06-03 RX ORDER — QUETIAPINE FUMARATE 100 MG/1
100 TABLET, FILM COATED ORAL EVERY EVENING
Qty: 30 TABLET | Refills: 11 | Status: SHIPPED | OUTPATIENT
Start: 2024-06-03

## 2024-06-04 ENCOUNTER — PATIENT MESSAGE (OUTPATIENT)
Dept: MEDICAL GROUP | Facility: LAB | Age: 69
End: 2024-06-04
Payer: MEDICARE

## 2024-06-05 ENCOUNTER — PATIENT MESSAGE (OUTPATIENT)
Dept: MEDICAL GROUP | Facility: LAB | Age: 69
End: 2024-06-05
Payer: MEDICARE

## 2024-07-02 RX ORDER — SEMAGLUTIDE 2.68 MG/ML
2 INJECTION, SOLUTION SUBCUTANEOUS
Qty: 3 ML | Refills: 3 | Status: SHIPPED | OUTPATIENT
Start: 2024-07-02

## 2024-08-07 ENCOUNTER — OFFICE VISIT (OUTPATIENT)
Dept: MEDICAL GROUP | Facility: LAB | Age: 69
End: 2024-08-07
Payer: MEDICARE

## 2024-08-07 VITALS
HEART RATE: 100 BPM | OXYGEN SATURATION: 95 % | SYSTOLIC BLOOD PRESSURE: 100 MMHG | HEIGHT: 65 IN | WEIGHT: 193 LBS | RESPIRATION RATE: 16 BRPM | DIASTOLIC BLOOD PRESSURE: 70 MMHG | TEMPERATURE: 96.5 F | BODY MASS INDEX: 32.15 KG/M2

## 2024-08-07 DIAGNOSIS — F40.243 FEAR OF FLYING: ICD-10-CM

## 2024-08-07 DIAGNOSIS — F41.9 ANXIETY: ICD-10-CM

## 2024-08-07 DIAGNOSIS — M25.551 BILATERAL HIP PAIN: ICD-10-CM

## 2024-08-07 DIAGNOSIS — I10 ESSENTIAL HYPERTENSION: ICD-10-CM

## 2024-08-07 DIAGNOSIS — M79.642 BILATERAL HAND PAIN: ICD-10-CM

## 2024-08-07 DIAGNOSIS — M25.552 BILATERAL HIP PAIN: ICD-10-CM

## 2024-08-07 DIAGNOSIS — M15.9 PRIMARY OSTEOARTHRITIS INVOLVING MULTIPLE JOINTS: ICD-10-CM

## 2024-08-07 DIAGNOSIS — N18.32 STAGE 3B CHRONIC KIDNEY DISEASE: ICD-10-CM

## 2024-08-07 DIAGNOSIS — M79.641 BILATERAL HAND PAIN: ICD-10-CM

## 2024-08-07 DIAGNOSIS — M79.671 BILATERAL FOOT PAIN: ICD-10-CM

## 2024-08-07 DIAGNOSIS — M79.672 BILATERAL FOOT PAIN: ICD-10-CM

## 2024-08-07 DIAGNOSIS — E04.1 THYROID NODULE: ICD-10-CM

## 2024-08-07 DIAGNOSIS — E11.9 TYPE 2 DIABETES MELLITUS WITHOUT COMPLICATION, WITHOUT LONG-TERM CURRENT USE OF INSULIN (HCC): ICD-10-CM

## 2024-08-07 LAB
HBA1C MFR BLD: 5.6 % (ref ?–5.8)
POCT INT CON NEG: NEGATIVE
POCT INT CON POS: POSITIVE

## 2024-08-07 PROCEDURE — 99215 OFFICE O/P EST HI 40 MIN: CPT | Performed by: NURSE PRACTITIONER

## 2024-08-07 PROCEDURE — 3074F SYST BP LT 130 MM HG: CPT | Performed by: NURSE PRACTITIONER

## 2024-08-07 PROCEDURE — 3078F DIAST BP <80 MM HG: CPT | Performed by: NURSE PRACTITIONER

## 2024-08-07 PROCEDURE — 83036 HEMOGLOBIN GLYCOSYLATED A1C: CPT | Performed by: NURSE PRACTITIONER

## 2024-08-07 PROCEDURE — 1170F FXNL STATUS ASSESSED: CPT | Performed by: NURSE PRACTITIONER

## 2024-08-07 RX ORDER — ALPRAZOLAM 0.5 MG
0.5 TABLET ORAL
Qty: 90 TABLET | Refills: 0 | Status: SHIPPED | OUTPATIENT
Start: 2024-08-07 | End: 2024-11-05

## 2024-08-07 RX ORDER — TIRZEPATIDE 7.5 MG/.5ML
7.5 INJECTION, SOLUTION SUBCUTANEOUS
Qty: 6 ML | Refills: 3 | Status: SHIPPED | OUTPATIENT
Start: 2024-08-07

## 2024-08-07 RX ORDER — LORAZEPAM 0.5 MG/1
0.5 TABLET ORAL
Qty: 5 TABLET | Refills: 0 | Status: SHIPPED | OUTPATIENT
Start: 2024-08-07 | End: 2024-08-12

## 2024-08-07 RX ORDER — GABAPENTIN 300 MG/1
300 CAPSULE ORAL 2 TIMES DAILY PRN
Qty: 60 CAPSULE | Refills: 0 | Status: SHIPPED | OUTPATIENT
Start: 2024-08-07

## 2024-08-07 RX ORDER — QUETIAPINE FUMARATE 100 MG/1
100 TABLET, FILM COATED ORAL EVERY EVENING
Qty: 90 TABLET | Refills: 3 | Status: SHIPPED | OUTPATIENT
Start: 2024-08-07

## 2024-08-07 ASSESSMENT — FIBROSIS 4 INDEX: FIB4 SCORE: 1.08

## 2024-08-07 NOTE — PROGRESS NOTES
Verbal consent was acquired by the patient to use [employer] ambient listening note generation during this visit Yes      Subjective   Fadumo Adair is a 68 y.o. female who presents for f/u on chronic issues.   History of Present Illness  The patient is a 68-year-old established female here for follow-up. She was last seen 05/02/2024 regarding type 2 diabetes. She was struggling to get mounjaro at that time and I was concerned about her kidneys as well as a thyroid nodule on CT cardiac score. I did stop her hydrochlorothiazide and Celebrex at her last visit. She was able to see nephrology on 05/15/2024 and was told that her abnormal kidney labs were from a prerenal component secondary to aggressive diuresis while on Celebrex and that her kidney function has improved off of diuretics and Celebrex, that she should continue to avoid Celebrex and NSAIDs and use diuretics only as needed.    She reports feeling well today and has discontinued Celebrex. She experiences foot pain, hip pain, right knee pain, and hand pain.    Being off of hydrochlorothiazide she has not noticed any significant edema in her lower extremities.  She does not have a history of congestive heart failure.  She does have hypertension but her blood pressure has been controlled with 20 mg of lisinopril.  She denies any breathing difficulties. She has noticed swelling after consuming salty foods, such as Chinese or French fries, but is mindful of her diet.     Type 2 diabetes: She has not been exercising.  She takes metformin in the morning which does not cause her stomach discomfort. She is also taking Ozempic 2 mg, which makes her feel full quickly and helps her think more about what she is eating. She has lost 8 pounds and has a target weight of 140 to 160 pounds. She is not physically active, but walks daily around stores. She has one week's supply of Ozempic left.  She would like to switch back to Mounjaro as this was more helpful for her  "to lose weight and lower her portions.  She was having trouble obtaining Mounjaro at her mail order if you months ago.      She is planning a cruise to [place] on 09/20/2024 and requests a 90-day refill of her quetiapine and alprazolam. She takes alprazolam as needed and requests a dose increase as it does not seem to help her on airplanes.  She feels anxious on long flights.      She had a small nodule on her thyroid but has not followed up on it and has not had an ultrasound.     Review of Systems  Neg except hpi  Objective   /70 (BP Location: Right arm, Patient Position: Sitting, BP Cuff Size: Adult)   Pulse 100   Temp 35.8 °C (96.5 °F)   Resp 16   Ht 1.638 m (5' 4.5\")   Wt 87.5 kg (193 lb)   SpO2 95%   Physical Exam  Gen. appears healthy in no distress   Skin appropriate for sex and age   Neck trachea is midline  Lungs unlabored breathing  Heart regular rate  Neuro gait and station normal   Psych appropriate, calm, interactive, well-groomed    Results  Laboratory Studies  A1c is 5.6 today in the office.       Assessment & Plan  1. Type 2 diabetes.  Her A1c has improved to 5.6. She will maintain her metformin regimen. A prescription for Mounjaro 7.5 mg was sent to her mail order in place of Ozempic in hopes that it is in stock/available to her.  In 1 month we can increase to 10 mg if she would like which would further facilitate weight loss and keep her A1c below 6.5.  She would certainly benefit from very tight diabetic control to prevent any worsening kidney function.  I agree with her goal of getting down to about 160 pounds which would also be easier on her joints as well as her kidneys.  I will see her back here in December.    2. Fear of flying.  A prescription for lorazepam 0.5 mg, to be taken 45 minutes prior to flight, was provided.  Discussed increased duration of lorazepam versus alprazolam as well as side effects.  She understands that she should not take Xanax within 6 to 8 hours of " lorazepam.  She is only going to be using lorazepam for flying.  She was advised not to drink alcohol while on this medication.     3. Thyroid nodule.  Reiterated the importance of a specific thyroid ultrasound to check out the nature of this thyroid nodule and she was in agreement.    4. Hypertension.  Her blood pressure is well-controlled at 100/70. She was advised to bring her hydrochlorothiazide to [place] with her as she may notice some increased edema on the airplane.  If she continues to lose weight, the lisinopril dosage will be reduced to 10 mg.    5.  Multiple joint pain:  Unable to provide her with her previous prescription of codeine because of her benzodiazepine use.  She is unable to take NSAIDs because of her kidney disease.  Tylenol is insufficient for pain control alone.  She was given gabapentin to utilize for pain control while in [place] as she will be doing a lot of walking which typically exacerbates her multiple joint pain from osteoarthritis.  Discussed how to take gabapentin as well as potential side effects.  She will trial gabapentin at home 1 day when she will not get in her car for 8 hours to assess any induced fatigue and let me know if gabapentin is helpful/if she can tolerate it.    6.  Sleep disturbance:  Refilled quetiapine which she does well on.    Follow-up  She will follow up on 12/11/2024.       Reviewed her  profile.    Side effects of all medications prescribed today were discussed with the patient including how to take the medications and proper dosage. Discussed repercussions of not taking the medications as prescribed. Instructed to call the office should she have any negative side effects or problems with the medications.    In prescribing controlled substances to this patient, I certify that I have obtained and reviewed the medical history of Fadumo Adair. I have also made a good janet effort to obtain applicable records from other providers who have treated the  patient and records did not demonstrate any increased risk of substance abuse that would prevent me from prescribing controlled substances.     I have conducted a physical exam and documented it. I have reviewed Ms. Adair’s prescription history as maintained by the [place] Prescription Monitoring Program.           Please note that this dictation was created using voice recognition software. I have made every reasonable attempt to correct obvious errors, but I expect that there are errors of grammar and possibly content that I did not discover before finalizing the note.    [address] multiple major medical problems including high risk medications requiring detailed assessment, management, review of diagnostic data, ordering tests, monitoring high risk medication, communicating with the patient.     Total face to face time 40 minutes of which over 50% of this visit is spent in counseling, education and outlining a plan of treatment and coordination of care for the above conditions. This included but was not limited to discussion of medication options and potential risks related to the medications, referral and specialty care options. All patient questions were answered

## 2024-08-08 ENCOUNTER — PATIENT MESSAGE (OUTPATIENT)
Dept: MEDICAL GROUP | Facility: LAB | Age: 69
End: 2024-08-08
Payer: MEDICARE

## 2024-08-08 RX ORDER — SEMAGLUTIDE 2.68 MG/ML
2 INJECTION, SOLUTION SUBCUTANEOUS
Qty: 3 ML | Refills: 3 | Status: SHIPPED | OUTPATIENT
Start: 2024-08-08 | End: 2024-08-26 | Stop reason: SDUPTHER

## 2024-08-26 RX ORDER — SEMAGLUTIDE 2.68 MG/ML
2 INJECTION, SOLUTION SUBCUTANEOUS
Qty: 9 ML | Refills: 3 | Status: SHIPPED | OUTPATIENT
Start: 2024-08-26 | End: 2024-08-28 | Stop reason: SDUPTHER

## 2024-08-28 RX ORDER — SEMAGLUTIDE 2.68 MG/ML
2 INJECTION, SOLUTION SUBCUTANEOUS
Qty: 9 ML | Refills: 3 | Status: SHIPPED | OUTPATIENT
Start: 2024-08-28

## 2024-09-04 RX ORDER — GABAPENTIN 300 MG/1
CAPSULE ORAL
Qty: 60 CAPSULE | Refills: 6 | Status: SHIPPED | OUTPATIENT
Start: 2024-09-04 | End: 2024-09-04 | Stop reason: SDUPTHER

## 2024-09-04 RX ORDER — SEMAGLUTIDE 2.68 MG/ML
2 INJECTION, SOLUTION SUBCUTANEOUS
Qty: 9 ML | Refills: 3 | Status: SHIPPED | OUTPATIENT
Start: 2024-09-04

## 2024-09-04 RX ORDER — GABAPENTIN 300 MG/1
600 CAPSULE ORAL NIGHTLY PRN
Qty: 60 CAPSULE | Refills: 6 | Status: SHIPPED | OUTPATIENT
Start: 2024-09-04

## 2024-09-04 NOTE — TELEPHONE ENCOUNTER
Received request via: Pharmacy    Was the patient seen in the last year in this department? Yes    Does the patient have an active prescription (recently filled or refills available) for medication(s) requested? No    Pharmacy Name: [facility], [address]    Does the patient have retirement Plus and need 100-day supply? (This applies to ALL medications) Patient does not have SCP

## 2024-09-16 ENCOUNTER — PATIENT MESSAGE (OUTPATIENT)
Dept: MEDICAL GROUP | Facility: LAB | Age: 69
End: 2024-09-16

## 2024-09-16 ENCOUNTER — HOSPITAL ENCOUNTER (OUTPATIENT)
Dept: RADIOLOGY | Facility: MEDICAL CENTER | Age: 69
End: 2024-09-16
Attending: NURSE PRACTITIONER
Payer: MEDICARE

## 2024-09-16 DIAGNOSIS — E04.1 THYROID NODULE: ICD-10-CM

## 2024-09-16 PROCEDURE — 76536 US EXAM OF HEAD AND NECK: CPT

## 2024-10-11 ENCOUNTER — HOSPITAL ENCOUNTER (OUTPATIENT)
Dept: RADIOLOGY | Facility: MEDICAL CENTER | Age: 69
End: 2024-10-11
Attending: NURSE PRACTITIONER
Payer: MEDICARE

## 2024-10-11 DIAGNOSIS — E04.1 THYROID NODULE: ICD-10-CM

## 2024-10-11 LAB — CYTOLOGY REG CYTOL: NORMAL

## 2024-10-11 PROCEDURE — 10005 FNA BX W/US GDN 1ST LES: CPT

## 2024-10-11 PROCEDURE — 88173 CYTOPATH EVAL FNA REPORT: CPT

## 2024-10-23 NOTE — TELEPHONE ENCOUNTER
RX FAXED   Apixaban/Eliquis increases your risk for bleeding. Notify your doctor if you experience any of the following side effects: bleeding, coughing or vomiting blood, red or black stool, unexpected pain or swelling, itching or hives, chest pain, chest tightness, trouble breathing, changes in how much or how often you urinate, red or pink urine, numbness or tingling in your feet, or unusual muscle weakness. When Apixaban/Eliquis is taken with other medicines, they can affect how it works. Taking other medications such as aspirin, blood thinners, nonsteroidal anti-inflammatories, and medications that treat depression can increase your risk of bleeding. It is very important to tell your health care provider about all of the other medicines, including over-the-counter medications, herbs, and vitamins you are taking. DO NOT start, stop, or change the dosage of any medicine, including over-the-counter medicines, vitamins, and herbal products without your doctor’s approval. Any products containing aspirin or are nonsteroidal anti-inflammatories lessen the blood’s ability to form clots and add to the effect of Apixaban/Eliquis. Never take aspirin or medicines that contain aspirin without speaking to your doctor.

## 2024-10-23 NOTE — ASSESSMENT & PLAN NOTE
"Chronic issue.  Bothers her in hands / back / hips / knees / feet.  Pain is a stiffness in the morning, better with movement and then regresses at night with aching.  Sitting is hard on her - makes back pain worse.  Rides bike which helps.    Meds: celebrex bid - 200 mg is more helpful than 100 mg.    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  Tries to keep to 3 per day.      Last x-ray of hips 2018 - mild arthritis.      "
Chronic issue.  Takes xanax as needed for anxiety or panic attack - typically 2-3 am if brain is busy / anxious.  Can go a few days without needed xanax.   
Class II - visualization of the soft palate, fauces, and uvula

## 2024-11-18 DIAGNOSIS — F41.9 ANXIETY: ICD-10-CM

## 2024-11-18 RX ORDER — ALPRAZOLAM 0.5 MG
0.5 TABLET ORAL
Qty: 90 TABLET | Refills: 0 | Status: SHIPPED | OUTPATIENT
Start: 2024-11-18 | End: 2025-02-16

## 2024-12-02 ENCOUNTER — HOSPITAL ENCOUNTER (OUTPATIENT)
Dept: LAB | Facility: MEDICAL CENTER | Age: 69
End: 2024-12-02
Attending: NURSE PRACTITIONER
Payer: MEDICARE

## 2024-12-02 ENCOUNTER — HOSPITAL ENCOUNTER (OUTPATIENT)
Dept: LAB | Facility: MEDICAL CENTER | Age: 69
End: 2024-12-02
Attending: INTERNAL MEDICINE
Payer: MEDICARE

## 2024-12-02 DIAGNOSIS — I10 ESSENTIAL HYPERTENSION: ICD-10-CM

## 2024-12-02 DIAGNOSIS — E11.9 TYPE 2 DIABETES MELLITUS WITHOUT COMPLICATION, WITHOUT LONG-TERM CURRENT USE OF INSULIN (HCC): ICD-10-CM

## 2024-12-02 DIAGNOSIS — N17.9 AKI (ACUTE KIDNEY INJURY) (HCC): ICD-10-CM

## 2024-12-02 LAB
ANION GAP SERPL CALC-SCNC: 11 MMOL/L (ref 7–16)
BUN SERPL-MCNC: 17 MG/DL (ref 8–22)
CALCIUM SERPL-MCNC: 9.7 MG/DL (ref 8.5–10.5)
CHLORIDE SERPL-SCNC: 100 MMOL/L (ref 96–112)
CHOLEST SERPL-MCNC: 172 MG/DL (ref 100–199)
CO2 SERPL-SCNC: 25 MMOL/L (ref 20–33)
CREAT SERPL-MCNC: 0.97 MG/DL (ref 0.5–1.4)
ERYTHROCYTE [DISTWIDTH] IN BLOOD BY AUTOMATED COUNT: 49.1 FL (ref 35.9–50)
EST. AVERAGE GLUCOSE BLD GHB EST-MCNC: 123 MG/DL
FASTING STATUS PATIENT QL REPORTED: NORMAL
FASTING STATUS PATIENT QL REPORTED: NORMAL
GFR SERPLBLD CREATININE-BSD FMLA CKD-EPI: 64 ML/MIN/1.73 M 2
GLUCOSE SERPL-MCNC: 91 MG/DL (ref 65–99)
HBA1C MFR BLD: 5.9 % (ref 4–5.6)
HCT VFR BLD AUTO: 36.4 % (ref 37–47)
HDLC SERPL-MCNC: 45 MG/DL
HGB BLD-MCNC: 11.8 G/DL (ref 12–16)
LDLC SERPL CALC-MCNC: 95 MG/DL
MCH RBC QN AUTO: 29.7 PG (ref 27–33)
MCHC RBC AUTO-ENTMCNC: 32.4 G/DL (ref 32.2–35.5)
MCV RBC AUTO: 91.7 FL (ref 81.4–97.8)
PLATELET # BLD AUTO: 207 K/UL (ref 164–446)
PMV BLD AUTO: 11.4 FL (ref 9–12.9)
POTASSIUM SERPL-SCNC: 4.8 MMOL/L (ref 3.6–5.5)
RBC # BLD AUTO: 3.97 M/UL (ref 4.2–5.4)
SODIUM SERPL-SCNC: 136 MMOL/L (ref 135–145)
TRIGL SERPL-MCNC: 158 MG/DL (ref 0–149)
TSH SERPL-ACNC: 1.3 UIU/ML (ref 0.35–5.5)
WBC # BLD AUTO: 5.8 K/UL (ref 4.8–10.8)

## 2024-12-02 PROCEDURE — 36415 COLL VENOUS BLD VENIPUNCTURE: CPT

## 2024-12-02 PROCEDURE — 82570 ASSAY OF URINE CREATININE: CPT

## 2024-12-02 PROCEDURE — 84443 ASSAY THYROID STIM HORMONE: CPT

## 2024-12-02 PROCEDURE — 80048 BASIC METABOLIC PNL TOTAL CA: CPT

## 2024-12-02 PROCEDURE — 82043 UR ALBUMIN QUANTITATIVE: CPT

## 2024-12-02 PROCEDURE — 83036 HEMOGLOBIN GLYCOSYLATED A1C: CPT | Mod: GA

## 2024-12-02 PROCEDURE — 85027 COMPLETE CBC AUTOMATED: CPT

## 2024-12-02 PROCEDURE — 80061 LIPID PANEL: CPT

## 2024-12-03 LAB
CREAT UR-MCNC: 18.19 MG/DL
MICROALBUMIN UR-MCNC: 2.3 MG/DL
MICROALBUMIN/CREAT UR: 126 MG/G (ref 0–30)

## 2024-12-04 ENCOUNTER — OFFICE VISIT (OUTPATIENT)
Dept: NEPHROLOGY | Facility: MEDICAL CENTER | Age: 69
End: 2024-12-04
Payer: MEDICARE

## 2024-12-04 VITALS
BODY MASS INDEX: 33.29 KG/M2 | TEMPERATURE: 97.4 F | OXYGEN SATURATION: 97 % | HEART RATE: 96 BPM | WEIGHT: 195 LBS | DIASTOLIC BLOOD PRESSURE: 76 MMHG | HEIGHT: 64 IN | SYSTOLIC BLOOD PRESSURE: 124 MMHG

## 2024-12-04 DIAGNOSIS — R80.9 PROTEINURIA, UNSPECIFIED TYPE: ICD-10-CM

## 2024-12-04 DIAGNOSIS — N18.9 CHRONIC KIDNEY DISEASE, UNSPECIFIED CKD STAGE: ICD-10-CM

## 2024-12-04 DIAGNOSIS — E11.9 TYPE 2 DIABETES MELLITUS WITHOUT COMPLICATION, WITHOUT LONG-TERM CURRENT USE OF INSULIN (HCC): ICD-10-CM

## 2024-12-04 DIAGNOSIS — I10 ESSENTIAL HYPERTENSION: ICD-10-CM

## 2024-12-04 PROCEDURE — G2211 COMPLEX E/M VISIT ADD ON: HCPCS | Performed by: INTERNAL MEDICINE

## 2024-12-04 PROCEDURE — 1170F FXNL STATUS ASSESSED: CPT | Performed by: INTERNAL MEDICINE

## 2024-12-04 PROCEDURE — 99214 OFFICE O/P EST MOD 30 MIN: CPT | Performed by: INTERNAL MEDICINE

## 2024-12-04 PROCEDURE — 3078F DIAST BP <80 MM HG: CPT | Performed by: INTERNAL MEDICINE

## 2024-12-04 PROCEDURE — 3074F SYST BP LT 130 MM HG: CPT | Performed by: INTERNAL MEDICINE

## 2024-12-04 ASSESSMENT — ENCOUNTER SYMPTOMS
COUGH: 0
VOMITING: 0
NAUSEA: 0
SHORTNESS OF BREATH: 0
CHILLS: 0
HYPERTENSION: 1
FEVER: 0

## 2024-12-04 ASSESSMENT — FIBROSIS 4 INDEX: FIB4 SCORE: 1.25

## 2024-12-04 NOTE — PROGRESS NOTES
"Subjective     Fadumo Adair is a 68 y.o. female who presents with Chronic Kidney Disease            Chronic Kidney Disease  This is a chronic problem. The current episode started more than 1 year ago. The problem occurs constantly. The problem has been unchanged. Pertinent negatives include no chest pain, chills, coughing, fever, nausea, urinary symptoms or vomiting.   Hypertension  This is a chronic problem. The current episode started more than 1 year ago. The problem is unchanged. The problem is controlled. Pertinent negatives include no chest pain, malaise/fatigue, peripheral edema or shortness of breath. Risk factors for coronary artery disease include post-menopausal state and diabetes mellitus. Past treatments include ACE inhibitors. The current treatment provides significant improvement. There are no compliance problems.  Hypertensive end-organ damage includes kidney disease. Identifiable causes of hypertension include chronic renal disease.       Review of Systems   Constitutional:  Negative for chills, fever and malaise/fatigue.   Respiratory:  Negative for cough and shortness of breath.    Cardiovascular:  Negative for chest pain and leg swelling.   Gastrointestinal:  Negative for nausea and vomiting.   Genitourinary:  Negative for dysuria, frequency and urgency.              Objective     /76 (BP Location: Right arm, Patient Position: Sitting, BP Cuff Size: Adult)   Pulse 96   Temp 36.3 °C (97.4 °F) (Temporal)   Ht 1.626 m (5' 4\")   Wt 88.5 kg (195 lb)   LMP 09/10/2011   SpO2 97%   BMI 33.47 kg/m²      Physical Exam  Vitals and nursing note reviewed.   Constitutional:       General: She is not in acute distress.     Appearance: Normal appearance. She is well-developed. She is not diaphoretic.   HENT:      Head: Normocephalic and atraumatic.      Right Ear: External ear normal.      Left Ear: External ear normal.      Nose: Nose normal.   Eyes:      General: No scleral icterus.        " Right eye: No discharge.         Left eye: No discharge.      Conjunctiva/sclera: Conjunctivae normal.   Cardiovascular:      Rate and Rhythm: Normal rate and regular rhythm.      Heart sounds: No murmur heard.  Pulmonary:      Effort: Pulmonary effort is normal. No respiratory distress.      Breath sounds: Normal breath sounds.   Musculoskeletal:         General: No tenderness.      Right lower leg: No edema.      Left lower leg: No edema.   Skin:     General: Skin is warm and dry.      Findings: No erythema.   Neurological:      General: No focal deficit present.      Mental Status: She is alert and oriented to person, place, and time.      Cranial Nerves: No cranial nerve deficit.   Psychiatric:         Mood and Affect: Mood normal.         Behavior: Behavior normal.       Past Medical History:   Diagnosis Date    Anemia     Arrhythmia     mild, pt not sure problem, does not see cardiologist     Arthritis     hip, knees, back    Cancer (HCC)     bladder    Heart burn     High cholesterol     HTN     Hyperlipemia     Indigestion     Low back pain 13    hips, and knees    Urinary bladder disorder      Social History     Socioeconomic History    Marital status:      Spouse name: Not on file    Number of children: Not on file    Years of education: Not on file    Highest education level: Not on file   Occupational History    Not on file   Tobacco Use    Smoking status: Former     Current packs/day: 0.00     Average packs/day: 0.5 packs/day for 30.0 years (15.0 ttl pk-yrs)     Types: Cigarettes     Start date: 1981     Quit date: 2011     Years since quittin.8    Smokeless tobacco: Never   Vaping Use    Vaping status: Never Used   Substance and Sexual Activity    Alcohol use: Yes     Alcohol/week: 2.4 oz     Types: 4 Standard drinks or equivalent per week     Comment: 4 a week     Drug use: Not Currently    Sexual activity: Not on file   Other Topics Concern     Service No    Blood  Transfusions No    Caffeine Concern No    Occupational Exposure No    Hobby Hazards No    Sleep Concern Yes    Stress Concern No    Weight Concern Yes    Special Diet No    Back Care No    Exercise Yes    Bike Helmet Yes    Seat Belt Yes    Self-Exams No   Social History Narrative    Not on file     Social Drivers of Health     Financial Resource Strain: Not on file   Food Insecurity: Not on file   Transportation Needs: Not on file   Physical Activity: Not on file   Stress: Not on file   Social Connections: Not on file   Intimate Partner Violence: Not on file   Housing Stability: Not on file     Family History   Problem Relation Age of Onset    Cancer Mother     Heart Disease Father      Recent Labs     04/30/24  1536 05/13/24  1210 12/02/24  0807 12/02/24  0808   HDL  --   --  45  --    TRIGLYCERIDE  --   --  158*  --    SODIUM 137 143  --  136   POTASSIUM 4.8 4.8  --  4.8   CHLORIDE 101 106  --  100   CO2 23 24  --  25   BUN 29* 17  --  17   CREATININE 1.65* 0.95  --  0.97                             Assessment & Plan        Assessment & Plan  Essential hypertension  Controlled  Continue same medication regimen  Continue low-sodium diet      Chronic kidney disease, unspecified CKD stage  Stable  No uremic symptoms  Renal dose of medication  Avoid nephrotoxins  Continue same medication regimen  Patient was advised to call us if symptoms worsen      Proteinuria, unspecified type  Possible early sign of diabetic nephropathy  Continue ACE inhibitor  Recheck labs in 3 to 6 months if proteinuria persist we will start SGLT2 inhibitor    Type 2 diabetes mellitus without complication, without long-term current use of insulin (HCC)  Continue to optimize diabetes control for hemoglobin A1c below 7%

## 2024-12-11 ENCOUNTER — OFFICE VISIT (OUTPATIENT)
Dept: MEDICAL GROUP | Facility: LAB | Age: 69
End: 2024-12-11
Payer: MEDICARE

## 2024-12-11 VITALS
SYSTOLIC BLOOD PRESSURE: 110 MMHG | RESPIRATION RATE: 12 BRPM | TEMPERATURE: 96.1 F | OXYGEN SATURATION: 96 % | BODY MASS INDEX: 32.61 KG/M2 | HEART RATE: 84 BPM | DIASTOLIC BLOOD PRESSURE: 70 MMHG | HEIGHT: 64 IN | WEIGHT: 191 LBS

## 2024-12-11 DIAGNOSIS — M25.551 BILATERAL HIP PAIN: ICD-10-CM

## 2024-12-11 DIAGNOSIS — M25.552 BILATERAL HIP PAIN: ICD-10-CM

## 2024-12-11 DIAGNOSIS — F51.01 PRIMARY INSOMNIA: ICD-10-CM

## 2024-12-11 DIAGNOSIS — Z96.642 STATUS POST LEFT HIP REPLACEMENT: ICD-10-CM

## 2024-12-11 DIAGNOSIS — E11.9 TYPE 2 DIABETES MELLITUS WITHOUT COMPLICATION, WITHOUT LONG-TERM CURRENT USE OF INSULIN (HCC): ICD-10-CM

## 2024-12-11 PROCEDURE — 3074F SYST BP LT 130 MM HG: CPT | Performed by: NURSE PRACTITIONER

## 2024-12-11 PROCEDURE — 1170F FXNL STATUS ASSESSED: CPT | Performed by: NURSE PRACTITIONER

## 2024-12-11 PROCEDURE — 99214 OFFICE O/P EST MOD 30 MIN: CPT | Performed by: NURSE PRACTITIONER

## 2024-12-11 PROCEDURE — 3078F DIAST BP <80 MM HG: CPT | Performed by: NURSE PRACTITIONER

## 2024-12-11 RX ORDER — GABAPENTIN 300 MG/1
300-600 CAPSULE ORAL 3 TIMES DAILY PRN
Qty: 180 CAPSULE | Refills: 3 | Status: SHIPPED | OUTPATIENT
Start: 2024-12-11

## 2024-12-11 RX ORDER — QUETIAPINE FUMARATE 100 MG/1
100 TABLET, FILM COATED ORAL EVERY EVENING
Qty: 90 TABLET | Refills: 3 | Status: SHIPPED | OUTPATIENT
Start: 2024-12-11

## 2024-12-11 ASSESSMENT — FIBROSIS 4 INDEX: FIB4 SCORE: 1.25

## 2024-12-11 NOTE — PROGRESS NOTES
Verbal consent was acquired by the patient to use [employer] ambient listening note generation during this visit Yes      Subjective   Fadumo Adair is a 68 y.o. female who presents for follow-up regarding multiple issues  History of Present Illness  The patient is a 68-year-old established female here to follow up. She was last seen by nephrology last week and told that her chronic kidney disease is stable, that she has early signs of diabetic nephropathy because of proteinuria, and that her goal A1c is below 7. Her last A1c was on 12/02/2024, and it is at 5.9. She is doing well on GLP-1 agonist therapy, Ozempic 2 mg weekly but did better on Mounjaro and would like to switch back if available/covered.    She reports experiencing pain in her hips and back at night, which causes her to toss and turn during sleep. The pain is particularly severe in her right hip, which has been previously replaced. She also experiences pain in her knees and the tops of her feet, which disrupts her sleep. She has been managing her pain with Tylenol, which she finds helpful. She is considering returning to Dr. Myrick.  She has found some relief from her symptoms through weight loss, having lost approximately 30 to 40 pounds from her peak weight of 227 pounds. The pain intensifies when she lies on either side and is also present during the day, particularly when walking or engaging in physical activities such as taking vacations and walking. She takes quetiapine and gabapentin 300 mg before bed, the latter of which she also takes in the morning.    Type 2 diabetes: Chronic issue.  Very well-controlled.  Still would like to lose another 30 to 50 pounds.  She is currently on Ozempic 2 mg, which she reports does not cause nausea but does result in constipation. She has one box remaining from her 90-day supply. She expresses a desire to return to Mounjaro due to its lower cost and better efficacy in weight loss. She is considering  "increasing her metformin dosage from one to two tablets daily due to a recent increase in her blood sugar levels from 5.6 to 5.9% A1c. She has been advised by nephrology to consider Jardiance in six months if there are concerns about her kidney health.    She uses Xanax for anxiety and occasionally when she wakes up in the middle of the night. She has found lorazepam helpful in managing her anxiety during flights.    SOCIAL HISTORY  She does not drink alcohol anymore.      Review of Systems  Negative except for HPI  Objective   /70 (BP Location: Right arm, Patient Position: Sitting, BP Cuff Size: Large adult)   Pulse 84   Temp (!) 35.6 °C (96.1 °F)   Resp 12   Ht 1.626 m (5' 4\")   Wt 86.6 kg (191 lb)   SpO2 96%   Physical Exam  Gen. appears healthy in no distress   Skin appropriate for sex and age   Neck trachea is midline  Lungs unlabored breathing  Heart regular rate  Neuro gait and station normal   Psych appropriate, calm, interactive, well-groomed    Results  Laboratory Studies  A1c is at 5.9.       Assessment & Plan  1.  Bilateral hip pain:  May increase gabapentin to 600 mg at night and up to 900 mg over the next week or 2 if needed.  Encouraged her to follow-up with Dr. Myrick.  Encouraged her to continue with efforts at weight loss and exercise.    2. Diabetes mellitus.  Her A1c level has increased from 5.6 to 5.9, but it remains within the excellent prediabetic range.  She will transition from Ozempic 2 mg to Mounjaro 7.5 mg. If she tolerates Mounjaro well, the dosage can be increased monthly to 15 mg.  Recommend repeat A1c this summer when she does labs for nephrology.  She takes good care of her feet.  She is up-to-date with her eye doctor.  Her vaccines are up-to-date.  Her kidney function is up-to-date.    3. Anxiety.  She uses Xanax for anxiety and sometimes when she wakes up at night. She has been advised to be cautious with Xanax, especially when taking higher doses of gabapentin.  She " understands that she should not take Xanax within 6 hours of driving a car, operating machinery or drinking alcohol.  She understands the chemically dependent nature of Xanax.    Follow-up  The patient is scheduled for a follow-up visit on 06/16/2024 at 9:20 AM.               Please note that this dictation was created using voice recognition software. I have made every reasonable attempt to correct obvious errors, but I expect that there are errors of grammar and possibly content that I did not discover before finalizing the note.

## 2025-01-23 RX ORDER — TIRZEPATIDE 10 MG/.5ML
10 INJECTION, SOLUTION SUBCUTANEOUS
Qty: 2 ML | Refills: 2 | Status: SHIPPED | OUTPATIENT
Start: 2025-01-23

## 2025-02-12 DIAGNOSIS — F41.9 ANXIETY: ICD-10-CM

## 2025-02-12 RX ORDER — ALPRAZOLAM 0.5 MG
0.5 TABLET ORAL
Qty: 90 TABLET | Refills: 0 | Status: SHIPPED | OUTPATIENT
Start: 2025-02-16 | End: 2025-05-17

## 2025-02-26 DIAGNOSIS — E78.49 OTHER HYPERLIPIDEMIA: ICD-10-CM

## 2025-02-26 DIAGNOSIS — I10 ESSENTIAL HYPERTENSION: ICD-10-CM

## 2025-02-26 DIAGNOSIS — R73.01 IMPAIRED FASTING GLUCOSE: ICD-10-CM

## 2025-02-26 RX ORDER — METFORMIN HYDROCHLORIDE 500 MG/1
TABLET, EXTENDED RELEASE ORAL
Qty: 180 TABLET | Refills: 3 | Status: SHIPPED | OUTPATIENT
Start: 2025-02-26

## 2025-02-26 RX ORDER — ATORVASTATIN CALCIUM 40 MG/1
40 TABLET, FILM COATED ORAL
Qty: 90 TABLET | Refills: 3 | Status: SHIPPED | OUTPATIENT
Start: 2025-02-26

## 2025-02-26 RX ORDER — LISINOPRIL 20 MG/1
20 TABLET ORAL
Qty: 90 TABLET | Refills: 3 | Status: SHIPPED | OUTPATIENT
Start: 2025-02-26

## 2025-03-25 RX ORDER — GABAPENTIN 300 MG/1
CAPSULE ORAL
Qty: 180 CAPSULE | Refills: 3 | Status: SHIPPED | OUTPATIENT
Start: 2025-03-25

## 2025-05-08 RX ORDER — TIRZEPATIDE 10 MG/.5ML
INJECTION, SOLUTION SUBCUTANEOUS
Qty: 2 ML | Refills: 2 | Status: SHIPPED | OUTPATIENT
Start: 2025-05-08

## 2025-05-27 DIAGNOSIS — F41.9 ANXIETY: ICD-10-CM

## 2025-05-28 RX ORDER — ALPRAZOLAM 0.5 MG
0.5 TABLET ORAL
Qty: 30 TABLET | Refills: 0 | Status: SHIPPED | OUTPATIENT
Start: 2025-05-28 | End: 2025-06-27

## 2025-05-30 ENCOUNTER — HOSPITAL ENCOUNTER (OUTPATIENT)
Dept: LAB | Facility: MEDICAL CENTER | Age: 70
End: 2025-05-30
Attending: NURSE PRACTITIONER
Payer: MEDICARE

## 2025-05-30 ENCOUNTER — HOSPITAL ENCOUNTER (OUTPATIENT)
Dept: LAB | Facility: MEDICAL CENTER | Age: 70
End: 2025-05-30
Attending: INTERNAL MEDICINE
Payer: MEDICARE

## 2025-05-30 DIAGNOSIS — E11.9 TYPE 2 DIABETES MELLITUS WITHOUT COMPLICATION, WITHOUT LONG-TERM CURRENT USE OF INSULIN (HCC): ICD-10-CM

## 2025-05-30 DIAGNOSIS — N18.9 CHRONIC KIDNEY DISEASE, UNSPECIFIED CKD STAGE: ICD-10-CM

## 2025-05-30 DIAGNOSIS — I10 ESSENTIAL HYPERTENSION: ICD-10-CM

## 2025-05-30 DIAGNOSIS — R80.9 PROTEINURIA, UNSPECIFIED TYPE: ICD-10-CM

## 2025-05-30 LAB
ANION GAP SERPL CALC-SCNC: 12 MMOL/L (ref 7–16)
BUN SERPL-MCNC: 19 MG/DL (ref 8–22)
CALCIUM SERPL-MCNC: 9.5 MG/DL (ref 8.5–10.5)
CHLORIDE SERPL-SCNC: 103 MMOL/L (ref 96–112)
CO2 SERPL-SCNC: 23 MMOL/L (ref 20–33)
CREAT SERPL-MCNC: 1.04 MG/DL (ref 0.5–1.4)
CREAT UR-MCNC: 34 MG/DL
ERYTHROCYTE [DISTWIDTH] IN BLOOD BY AUTOMATED COUNT: 48.6 FL (ref 35.9–50)
EST. AVERAGE GLUCOSE BLD GHB EST-MCNC: 123 MG/DL
GFR SERPLBLD CREATININE-BSD FMLA CKD-EPI: 59 ML/MIN/1.73 M 2
GLUCOSE SERPL-MCNC: 93 MG/DL (ref 65–99)
HBA1C MFR BLD: 5.9 % (ref 4–5.6)
HCT VFR BLD AUTO: 38.5 % (ref 37–47)
HGB BLD-MCNC: 12 G/DL (ref 12–16)
MCH RBC QN AUTO: 28.8 PG (ref 27–33)
MCHC RBC AUTO-ENTMCNC: 31.2 G/DL (ref 32.2–35.5)
MCV RBC AUTO: 92.5 FL (ref 81.4–97.8)
MICROALBUMIN UR-MCNC: <1.2 MG/DL
MICROALBUMIN/CREAT UR: NORMAL MG/G (ref 0–30)
PLATELET # BLD AUTO: 184 K/UL (ref 164–446)
PMV BLD AUTO: 11.3 FL (ref 9–12.9)
POTASSIUM SERPL-SCNC: 4.3 MMOL/L (ref 3.6–5.5)
RBC # BLD AUTO: 4.16 M/UL (ref 4.2–5.4)
SODIUM SERPL-SCNC: 138 MMOL/L (ref 135–145)
WBC # BLD AUTO: 5.6 K/UL (ref 4.8–10.8)

## 2025-05-30 PROCEDURE — 82570 ASSAY OF URINE CREATININE: CPT

## 2025-05-30 PROCEDURE — 82043 UR ALBUMIN QUANTITATIVE: CPT

## 2025-05-30 PROCEDURE — 83036 HEMOGLOBIN GLYCOSYLATED A1C: CPT | Mod: GA

## 2025-05-30 PROCEDURE — 85027 COMPLETE CBC AUTOMATED: CPT

## 2025-05-30 PROCEDURE — 36415 COLL VENOUS BLD VENIPUNCTURE: CPT

## 2025-05-30 PROCEDURE — 80048 BASIC METABOLIC PNL TOTAL CA: CPT

## 2025-06-03 ENCOUNTER — APPOINTMENT (OUTPATIENT)
Dept: NEPHROLOGY | Facility: MEDICAL CENTER | Age: 70
End: 2025-06-03
Payer: MEDICARE

## 2025-06-03 ENCOUNTER — RESULTS FOLLOW-UP (OUTPATIENT)
Dept: MEDICAL GROUP | Facility: LAB | Age: 70
End: 2025-06-03

## 2025-06-03 VITALS
BODY MASS INDEX: 32.95 KG/M2 | HEIGHT: 64 IN | OXYGEN SATURATION: 94 % | SYSTOLIC BLOOD PRESSURE: 108 MMHG | WEIGHT: 193 LBS | DIASTOLIC BLOOD PRESSURE: 72 MMHG | TEMPERATURE: 97.3 F | HEART RATE: 96 BPM

## 2025-06-03 DIAGNOSIS — N18.9 CHRONIC KIDNEY DISEASE, UNSPECIFIED CKD STAGE: ICD-10-CM

## 2025-06-03 DIAGNOSIS — R80.9 PROTEINURIA, UNSPECIFIED TYPE: ICD-10-CM

## 2025-06-03 DIAGNOSIS — E11.9 TYPE 2 DIABETES MELLITUS WITHOUT COMPLICATION, WITHOUT LONG-TERM CURRENT USE OF INSULIN (HCC): ICD-10-CM

## 2025-06-03 DIAGNOSIS — I10 ESSENTIAL HYPERTENSION: Primary | ICD-10-CM

## 2025-06-03 PROCEDURE — G2211 COMPLEX E/M VISIT ADD ON: HCPCS | Performed by: INTERNAL MEDICINE

## 2025-06-03 PROCEDURE — 3078F DIAST BP <80 MM HG: CPT | Performed by: INTERNAL MEDICINE

## 2025-06-03 PROCEDURE — 3074F SYST BP LT 130 MM HG: CPT | Performed by: INTERNAL MEDICINE

## 2025-06-03 PROCEDURE — 1170F FXNL STATUS ASSESSED: CPT | Performed by: INTERNAL MEDICINE

## 2025-06-03 PROCEDURE — 99214 OFFICE O/P EST MOD 30 MIN: CPT | Performed by: INTERNAL MEDICINE

## 2025-06-03 ASSESSMENT — ENCOUNTER SYMPTOMS
VOMITING: 0
FEVER: 0
CHILLS: 0
NAUSEA: 0
COUGH: 0
SHORTNESS OF BREATH: 0
HYPERTENSION: 1

## 2025-06-03 ASSESSMENT — FIBROSIS 4 INDEX: FIB4 SCORE: 1.43

## 2025-06-03 NOTE — ASSESSMENT & PLAN NOTE
Controlled  Continue same medication regimen  Continue low-sodium diet    Orders:    Basic Metabolic Panel; Future    CBC WITHOUT DIFFERENTIAL; Future    MICROALB/CREAT RATIO RAND. UR    Basic Metabolic Panel; Future    CBC WITHOUT DIFFERENTIAL; Future    MICROALB/CREAT RATIO RAND. UR

## 2025-06-03 NOTE — PROGRESS NOTES
"Subjective     Fadumo Adair is a 69 y.o. female who presents with Chronic Kidney Disease and Hypertension            Chronic Kidney Disease  This is a chronic problem. The current episode started more than 1 year ago. The problem occurs constantly. The problem has been unchanged. Pertinent negatives include no chest pain, chills, coughing, fever, nausea, urinary symptoms or vomiting.   Hypertension  This is a chronic problem. The problem is unchanged. The problem is controlled. Pertinent negatives include no chest pain, malaise/fatigue, peripheral edema or shortness of breath. Risk factors for coronary artery disease include post-menopausal state and diabetes mellitus. Past treatments include ACE inhibitors. The current treatment provides significant improvement. There are no compliance problems.        Review of Systems   Constitutional:  Negative for chills, fever and malaise/fatigue.   Respiratory:  Negative for cough and shortness of breath.    Cardiovascular:  Negative for chest pain and leg swelling.   Gastrointestinal:  Negative for nausea and vomiting.   Genitourinary:  Negative for dysuria, frequency and urgency.              Objective     /72 (BP Location: Right arm, Patient Position: Sitting, BP Cuff Size: Adult)   Pulse 96   Temp 36.3 °C (97.3 °F) (Temporal)   Ht 1.626 m (5' 4\")   Wt 87.5 kg (193 lb)   LMP 09/10/2011   SpO2 94%   BMI 33.13 kg/m²      Physical Exam  Vitals and nursing note reviewed.   Constitutional:       General: She is not in acute distress.     Appearance: Normal appearance. She is well-developed. She is not diaphoretic.   HENT:      Head: Normocephalic and atraumatic.      Right Ear: External ear normal.      Left Ear: External ear normal.      Nose: Nose normal.   Eyes:      General: No scleral icterus.        Right eye: No discharge.         Left eye: No discharge.      Conjunctiva/sclera: Conjunctivae normal.   Cardiovascular:      Rate and Rhythm: Normal rate " and regular rhythm.      Heart sounds: No murmur heard.  Pulmonary:      Effort: Pulmonary effort is normal. No respiratory distress.      Breath sounds: Normal breath sounds.   Musculoskeletal:         General: No tenderness.      Right lower leg: No edema.      Left lower leg: No edema.   Skin:     General: Skin is warm and dry.      Findings: No erythema.   Neurological:      General: No focal deficit present.      Mental Status: She is alert and oriented to person, place, and time.      Cranial Nerves: No cranial nerve deficit.   Psychiatric:         Mood and Affect: Mood normal.         Behavior: Behavior normal.     Past Medical History[1]  Social History     Socioeconomic History    Marital status:      Spouse name: Not on file    Number of children: Not on file    Years of education: Not on file    Highest education level: Not on file   Occupational History    Not on file   Tobacco Use    Smoking status: Former     Current packs/day: 0.00     Average packs/day: 0.5 packs/day for 30.0 years (15.0 ttl pk-yrs)     Types: Cigarettes     Start date: 1981     Quit date: 2011     Years since quittin.3    Smokeless tobacco: Never   Vaping Use    Vaping status: Never Used   Substance and Sexual Activity    Alcohol use: Yes     Alcohol/week: 2.4 oz     Types: 4 Standard drinks or equivalent per week     Comment: 4 a week     Drug use: Not Currently    Sexual activity: Not on file   Other Topics Concern     Service No    Blood Transfusions No    Caffeine Concern No    Occupational Exposure No    Hobby Hazards No    Sleep Concern Yes    Stress Concern No    Weight Concern Yes    Special Diet No    Back Care No    Exercise Yes    Bike Helmet Yes    Seat Belt Yes    Self-Exams No   Social History Narrative    Not on file     Social Drivers of Health     Financial Resource Strain: Not on file   Food Insecurity: Not on file   Transportation Needs: Not on file   Physical Activity: Not on file    Stress: Not on file   Social Connections: Not on file   Intimate Partner Violence: Not on file   Housing Stability: Not on file     Family History   Problem Relation Age of Onset    Cancer Mother     Heart Disease Father      Recent Labs     12/02/24  0807 12/02/24  0808 05/30/25  1320   HDL 45  --   --    TRIGLYCERIDE 158*  --   --    SODIUM  --  136 138   POTASSIUM  --  4.8 4.3   CHLORIDE  --  100 103   CO2  --  25 23   BUN  --  17 19   CREATININE  --  0.97 1.04                                  Assessment & Plan  Essential hypertension  Controlled  Continue same medication regimen  Continue low-sodium diet    Orders:    Basic Metabolic Panel; Future    CBC WITHOUT DIFFERENTIAL; Future    MICROALB/CREAT RATIO RAND. UR    Basic Metabolic Panel; Future    CBC WITHOUT DIFFERENTIAL; Future    MICROALB/CREAT RATIO RAND. UR    Chronic kidney disease, unspecified CKD stage  Stable  No uremic symptoms  Renal dose of medication  Avoid nephrotoxins  Continue same medication regimen  Patient was advised to call us if symptoms worsen    Orders:    Basic Metabolic Panel; Future    CBC WITHOUT DIFFERENTIAL; Future    MICROALB/CREAT RATIO RAND. UR    Basic Metabolic Panel; Future    CBC WITHOUT DIFFERENTIAL; Future    MICROALB/CREAT RATIO RAND. UR    Type 2 diabetes mellitus without complication, without long-term current use of insulin (HCC)    Orders:    Basic Metabolic Panel; Future    CBC WITHOUT DIFFERENTIAL; Future    MICROALB/CREAT RATIO RAND. UR    Proteinuria, unspecified type  Resolved on the last test  Continue ACE inhibitor  Check labs annually  Orders:    Basic Metabolic Panel; Future    CBC WITHOUT DIFFERENTIAL; Future    MICROALB/CREAT RATIO RAND. UR                      [1]   Past Medical History:  Diagnosis Date    Anemia     Arrhythmia     mild, pt not sure problem, does not see cardiologist     Arthritis     hip, knees, back    Cancer (HCC)     bladder    Heart burn     High cholesterol     HTN      Hyperlipemia     Indigestion     Low back pain 09-28-11    hips, and knees    Urinary bladder disorder

## 2025-06-16 ENCOUNTER — OFFICE VISIT (OUTPATIENT)
Dept: MEDICAL GROUP | Facility: LAB | Age: 70
End: 2025-06-16
Payer: MEDICARE

## 2025-06-16 VITALS
SYSTOLIC BLOOD PRESSURE: 108 MMHG | BODY MASS INDEX: 33.12 KG/M2 | HEIGHT: 64 IN | DIASTOLIC BLOOD PRESSURE: 64 MMHG | OXYGEN SATURATION: 96 % | HEART RATE: 96 BPM | RESPIRATION RATE: 16 BRPM | WEIGHT: 194 LBS | TEMPERATURE: 97.1 F

## 2025-06-16 DIAGNOSIS — G47.00 INSOMNIA, UNSPECIFIED TYPE: ICD-10-CM

## 2025-06-16 DIAGNOSIS — N18.32 STAGE 3B CHRONIC KIDNEY DISEASE: ICD-10-CM

## 2025-06-16 DIAGNOSIS — Z85.51 HISTORY OF BLADDER CANCER: ICD-10-CM

## 2025-06-16 DIAGNOSIS — Z87.891 HISTORY OF SMOKING: ICD-10-CM

## 2025-06-16 DIAGNOSIS — F41.9 ANXIETY: ICD-10-CM

## 2025-06-16 DIAGNOSIS — Z96.642 STATUS POST LEFT HIP REPLACEMENT: ICD-10-CM

## 2025-06-16 DIAGNOSIS — M54.41 CHRONIC BILATERAL LOW BACK PAIN WITH BILATERAL SCIATICA: ICD-10-CM

## 2025-06-16 DIAGNOSIS — M15.0 PRIMARY OSTEOARTHRITIS INVOLVING MULTIPLE JOINTS: ICD-10-CM

## 2025-06-16 DIAGNOSIS — E11.9 TYPE 2 DIABETES MELLITUS WITHOUT COMPLICATION, WITHOUT LONG-TERM CURRENT USE OF INSULIN (HCC): ICD-10-CM

## 2025-06-16 DIAGNOSIS — E78.49 OTHER HYPERLIPIDEMIA: Primary | ICD-10-CM

## 2025-06-16 DIAGNOSIS — M25.562 CHRONIC PAIN OF LEFT KNEE: ICD-10-CM

## 2025-06-16 DIAGNOSIS — I10 ESSENTIAL HYPERTENSION: ICD-10-CM

## 2025-06-16 DIAGNOSIS — M25.551 BILATERAL HIP PAIN: ICD-10-CM

## 2025-06-16 DIAGNOSIS — M25.552 BILATERAL HIP PAIN: ICD-10-CM

## 2025-06-16 DIAGNOSIS — K21.9 GASTROESOPHAGEAL REFLUX DISEASE, UNSPECIFIED WHETHER ESOPHAGITIS PRESENT: ICD-10-CM

## 2025-06-16 DIAGNOSIS — M54.42 CHRONIC BILATERAL LOW BACK PAIN WITH BILATERAL SCIATICA: ICD-10-CM

## 2025-06-16 DIAGNOSIS — G89.29 CHRONIC BILATERAL LOW BACK PAIN WITH BILATERAL SCIATICA: ICD-10-CM

## 2025-06-16 DIAGNOSIS — Z85.51 HISTORY OF PRIMARY MALIGNANT NEOPLASM OF URINARY BLADDER: ICD-10-CM

## 2025-06-16 DIAGNOSIS — G89.29 CHRONIC PAIN OF LEFT KNEE: ICD-10-CM

## 2025-06-16 DIAGNOSIS — R51.9 GENERALIZED HEADACHES: ICD-10-CM

## 2025-06-16 DIAGNOSIS — D50.8 OTHER IRON DEFICIENCY ANEMIA: ICD-10-CM

## 2025-06-16 DIAGNOSIS — Z00.00 MEDICARE ANNUAL WELLNESS VISIT, SUBSEQUENT: ICD-10-CM

## 2025-06-16 DIAGNOSIS — R91.1 LUNG NODULE: ICD-10-CM

## 2025-06-16 PROCEDURE — 3078F DIAST BP <80 MM HG: CPT | Performed by: NURSE PRACTITIONER

## 2025-06-16 PROCEDURE — 99214 OFFICE O/P EST MOD 30 MIN: CPT | Mod: 25 | Performed by: NURSE PRACTITIONER

## 2025-06-16 PROCEDURE — 1170F FXNL STATUS ASSESSED: CPT | Performed by: NURSE PRACTITIONER

## 2025-06-16 PROCEDURE — 3074F SYST BP LT 130 MM HG: CPT | Performed by: NURSE PRACTITIONER

## 2025-06-16 PROCEDURE — G0439 PPPS, SUBSEQ VISIT: HCPCS | Performed by: NURSE PRACTITIONER

## 2025-06-16 RX ORDER — TIRZEPATIDE 12.5 MG/.5ML
12.5 INJECTION, SOLUTION SUBCUTANEOUS
Qty: 2 ML | Refills: 11 | Status: SHIPPED | OUTPATIENT
Start: 2025-06-16

## 2025-06-16 RX ORDER — ZOLPIDEM TARTRATE 10 MG/1
10 TABLET ORAL NIGHTLY PRN
Qty: 30 TABLET | Refills: 2 | Status: SHIPPED | OUTPATIENT
Start: 2025-06-16 | End: 2025-07-16

## 2025-06-16 ASSESSMENT — ENCOUNTER SYMPTOMS: GENERAL WELL-BEING: FAIR

## 2025-06-16 ASSESSMENT — PATIENT HEALTH QUESTIONNAIRE - PHQ9: CLINICAL INTERPRETATION OF PHQ2 SCORE: 0

## 2025-06-16 ASSESSMENT — ACTIVITIES OF DAILY LIVING (ADL): BATHING_REQUIRES_ASSISTANCE: 0

## 2025-06-16 ASSESSMENT — FIBROSIS 4 INDEX: FIB4 SCORE: 1.43

## 2025-06-16 NOTE — PROGRESS NOTES
Chief Complaint   Patient presents with    Medicare Annual Wellness     Verbal consent was acquired by the patient to use [place] ambient listening note generation during this visit Yes      History of Present Illness  The patient is a 69-year-old established female here for her Medicare annual wellness visit.    She continues to experience pain in her hip, back, and knees. The left hip, which was replaced in 2022, is more painful than the right, particularly during walking and lying down. She has previously engaged in physical therapy but does not currently perform any exercises. An injection administered by a physiatrist did not alleviate her symptoms and seemed to exacerbate her back pain. She also received a knee injection prior to a vacation.    Her back pain is localized to the lower region and radiates down both legs. She reports that standing for long periods exacerbates the pain. Her last MRI of the lumbar spine, conducted approximately eight years ago, revealed bulging disks at every level from L1 to the tailbone, but no central canal stenosis.    She experiences bilateral knee pain, with the left knee being more symptomatic. Her left knee was last evaluated in 2021, revealing significant cartilage loss. She is considering further evaluation and potential treatment options for her knee pain.    She has gained 3 pounds over the past month, which she attributes to recent dietary changes. Her highest recorded weight was 227 pounds. She is considering increasing her Mounjaro dosage from 10 to 12.5 mg as she feels the current dose is not as effective.    She reports poor sleep quality, characterized by frequent awakenings and difficulty falling asleep. She takes quetiapine for sleep but finds it ineffective. She has tried trazodone and Ambien in the past, with Ambien proving more effective. She occasionally takes alprazolam when she wakes up during the night. She typically takes her medications at 9:00 PM.  She reports no urinary frequency at night or falls.    She reports no dysphagia. She has reduced her cycling activity but maintains a moderate walking routine. She has a  who assists with household chores every other week. She reports no chest pain. She is on atorvastatin.    She has a lipoma to her trunk that occasionally causes discomfort, but she does not wish to have it removed at this time.    She has a history of iron deficiency anemia, which she manages with daily iron supplements. She reports no gastrointestinal bleeding.    She has a history of heartburn, which she manages with over-the-counter ranitidine.    She has a history of chronic kidney disease, which is currently stable. She is under the care of a nephrologist and urologist, both of whom she sees annually.    PAST SURGICAL HISTORY:  - Left hip replacement in 2022         Patient Active Problem List    Diagnosis Date Noted    Stage 3b chronic kidney disease 05/02/2024    Primary osteoarthritis involving multiple joints 05/02/2024    Type 2 diabetes mellitus without complication, without long-term current use of insulin (HCC) 05/02/2024    Iron deficiency anemia 02/01/2024    Status post left hip replacement 03/09/2022    Right hip pain 09/08/2021    Anxiety 03/04/2021    Lung nodule - 5 mm 8/2020 / 2023 - stable.  recheck 2026 08/31/2020    History of smoking (quit 2010 - total of 30 yrs) 08/31/2020    Gastroesophageal reflux disease 07/16/2020    History of primary malignant neoplasm of urinary bladder 03/12/2019    History of bladder cancer 08/21/2018    History of hysterectomy 02/27/2018    BMI 32.0-32.9,adult 02/27/2018    Generalized headaches - tylenol migraine works .  present since 50's. 12/19/2016    Essential hypertension 10/23/2015    Insomnia 05/14/2014    Family history of ovarian cancer 08/04/2010    Hyperlipemia     Low back pain        Current Medications[1]       Current supplements as per medication list.     Allergies:  Tree nuts food allergy    Current social contact/activities: Muslim/ babysit/ women's group     She  reports that she quit smoking about 14 years ago. Her smoking use included cigarettes. She started smoking about 44 years ago. She has a 15 pack-year smoking history. She has never used smokeless tobacco. She reports current alcohol use of about 2.4 oz of alcohol per week. She reports that she does not currently use drugs.  Counseling given: Not Answered      ROS:    Gait: Uses no assistive device  Ostomy: No  Other tubes: No  Amputations: No  Chronic oxygen use: No  Last eye exam: less than 1 year  Wears hearing aids: No   : Denies any urinary leakage during the last 6 months    Screening:    Depression Screening  Little interest or pleasure in doing things?  0 - not at all  Feeling down, depressed , or hopeless? 0 - not at all  Patient Health Questionnaire Score: 0     If depressive symptoms identified deferred to follow up visit unless specifically addressed in assessment and plan.    Interpretation of PHQ-9 Total Score   Score Severity   1-4 No Depression   5-9 Mild Depression   10-14 Moderate Depression   15-19 Moderately Severe Depression   20-27 Severe Depression    Screening for Cognitive Impairment  Do you or any of your friends or family members have any concern about your memory? No  Three Minute Recall (Village, Kitchen, Baby) 3/3    Clem clock face with all 12 numbers and set the hands to show 10 minutes past 11.  Yes    Cognitive concerns identified deferred for follow up unless specifically addressed in assessment and plan.    Fall Risk Assessment  Has the patient had two or more falls in the last year or any fall with injury in the last year?  No    Safety Assessment  Do you always wear your seatbelt?  Yes  Any changes to home needed to function safely? No  Difficulty hearing.  No  Patient counseled about all safety risks that were identified.    Functional Assessment ADLs  Are there any barriers  preventing you from cooking for yourself or meeting nutritional needs?  No.    Are there any barriers preventing you from driving safely or obtaining transportation?  No.    Are there any barriers preventing you from using a telephone or calling for help?  No    Are there any barriers preventing you from shopping?  No.    Are there any barriers preventing you from taking care of your own finances?  No    Are there any barriers preventing you from managing your medications?  No    Are there any barriers preventing you from showering, bathing or dressing yourself? No    Are there any barriers preventing you from doing housework or laundry? No  Are there any barriers preventing you from using the toilet?No  Are you currently engaging in any exercise or physical activity?  Yes.      Self-Assessment of Health  What is your perception of your health? Fair    Do you sleep more than six hours a night? Yes    In the past 7 days, how much did pain keep you from doing your normal work? Some    Do you spend quality time with family or friends (virtually or in person)? Yes    Do you usually eat a heart healthy diet that constists of a variety of fruits, vegetables, whole grains and fiber? Yes    Do you eat foods high in fat and/or Fast Food more than three times per week? No    How concerned are you that your medical conditions are not being well managed? Not at all    Are you worried that in the next 2 months, you may not have stable housing that you own, rent, or stay in as part of a household? No      Advance Care Planning  Do you have an Advance Directive, Living Will, Durable Power of , or POLST? Yes  Advance Directive       is not on file - instructed patient to bring in a copy to scan into their chart      Health Maintenance Summary            Current Care Gaps       Mammogram (Yearly) Overdue since 11/28/2024 11/28/2023  MA-SCREENING MAMMO BILAT W/TOMOSYNTHESIS W/CAD    10/19/2022  MA-SCREENING MAMMO BILAT  W/TOMOSYNTHESIS W/CAD    08/20/2020  MA-SCREENING MAMMO BILAT W/TOMOSYNTHESIS W/CAD    03/21/2018  MA-MAMMO SCREENING BILAT W/KETURAH W/CAD    05/30/2013  MA-SCREENING MAMMOGRAM W/ CAD     Only the first 5 history entries have been loaded, but more history exists.            Annual Wellness Visit (Yearly) Overdue since 2/1/2025 02/01/2024  Visit Dx: Medicare annual wellness visit, subsequent              Diabetes: Monofilament / LE Exam (Yearly) Overdue since 2/1/2025 02/01/2024  Diabetic Monofilament LE Exam              Diabetes: Retinopathy Screening (Yearly) Never done      No completion history exists for this topic.                      Needs Review       Fasting Lipid Profile (Yearly) Tentatively due on 12/2/2025 12/02/2024  Lipid Profile    11/20/2023  Lipid Profile    10/24/2022  LIPID PANEL    12/03/2021  LIPID PANEL    08/26/2020  Lipid Profile     Only the first 5 history entries have been loaded, but more history exists.                    Upcoming       COVID-19 Vaccine (4 - 2024-25 season) Postponed until 12/11/2025 12/03/2021  Imm Admin: PFIZER PURPLE CAP SARS-COV-2 VACCINATION (12+)    04/20/2021  Imm Admin: MODERNA SARS-COV-2 VACCINE (12+)    03/19/2021  Imm Admin: MODERNA SARS-COV-2 VACCINE (12+)              A1c Screening (Every 6 Months) Next due on 11/30/2025 05/30/2025  HEMOGLOBIN A1C    12/02/2024  HEMOGLOBIN A1C    08/07/2024  POCT Hemoglobin A1C    05/02/2024  POCT Hemoglobin A1C    11/20/2023  HEMOGLOBIN A1C      Only the first 5 history entries have been loaded, but more history exists.              Diabetes: Urine Protein Screening (Yearly) Next due on 5/30/2026 06/03/2025  Order placed for MICROALB/CREAT RATIO RAND. UR by Fadi Najjar, M.D.    05/30/2025  MICROALBUMIN CREAT RATIO URINE    12/02/2024  MICROALBUMIN CREAT RATIO URINE    02/01/2024  MICROALBUMIN CREAT RATIO URINE    09/05/2018  MICROALBUMIN CREAT RATIO URINE      Only the first 5 history entries  have been loaded, but more history exists.              SERUM CREATININE (Yearly) Next due on 5/30/2026 06/03/2025  Order placed for Basic Metabolic Panel by Fadi Najjar, M.D.    05/30/2025  Basic Metabolic Panel    12/02/2024  Basic Metabolic Panel    05/13/2024  Basic Metabolic Panel    04/30/2024  Basic Metabolic Panel      Only the first 5 history entries have been loaded, but more history exists.              Bone Density Scan (Every 5 Years) Next due on 11/28/2028 11/28/2023  DS-BONE DENSITY STUDY (DEXA)              Colorectal Cancer Screening (Colonoscopy - Preferred) Next due on 8/29/2029 08/29/2019  REFERRAL TO GI FOR COLONOSCOPY    10/12/2009  COLOGUARD COLON CANCER SCREENING    10/12/2009  REFERRAL TO GI FOR COLONOSCOPY              IMM DTaP/Tdap/Td Vaccine (3 - Td or Tdap) Next due on 4/28/2030 04/28/2020  Imm Admin: Tdap Vaccine    12/13/2010  Imm Admin: Tdap Vaccine                      Completed or No Longer Recommended       Hepatitis B Vaccine (Hep B) (Series Information) Completed      04/26/2023  Imm Admin: Hepatitis B Vaccine (Adol/Adult)    10/26/2022  Imm Admin: Hepatitis B Vaccine (Adol/Adult)    08/30/2012  Imm Admin: Hep A/HEP B Combined Vaccine (TwinRix)              Influenza Vaccine (Series Information) Completed      10/24/2024  Outside Immunization: Influenza Tri, Adj    10/26/2023  Imm Admin: Influenza Vaccine Adult HD    10/26/2022  Imm Admin: Influenza Vaccine Quad Inj (Pf)    11/02/2021  Imm Admin: Influenza Vaccine Quad Inj (Pf)    10/15/2020  Imm Admin: Influenza Vaccine Quad Inj (Pf)      Only the first 5 history entries have been loaded, but more history exists.              Zoster (Shingles) Vaccines (Series Information) Completed      10/15/2020  Imm Admin: Zoster Vaccine Recombinant (RZV) (SHINGRIX)    08/31/2020  Imm Admin: Zoster Vaccine Recombinant (RZV) (SHINGRIX)              Pneumococcal Vaccine: 50+ Years (Series Information) Completed       "04/08/2024  Imm Admin: Pneumococcal Conjugate Vaccine (PCV20)    04/26/2023  Imm Admin: Pneumococcal Conjugate Vaccine (PCV20)              Hepatitis A Vaccine (Hep A) (Series Information) Aged Out      08/30/2012  Imm Admin: Hep A/HEP B Combined Vaccine (TwinRix)    12/13/2010  Imm Admin: Hepatitis A Vaccine, Adult    12/13/2010  Imm Admin: Hepatitis A Vaccine, Ped/Adol              HPV Vaccines (Series Information) Aged Out      No completion history exists for this topic.              Meningococcal Immunization (Series Information) Aged Out     No completion history exists for this topic.              Meningococcal B Vaccine (Series Information) Aged Out     No completion history exists for this topic.              Cervical Cancer Screening  Discontinued        Frequency changed to Never automatically (Topic No Longer Applies)    03/08/2018  THINPREP PAP,REFLEX HPV ON ASC-US ONLY    05/14/2014  pap 21-29 no std    05/18/2011  PAP IG (IMAGE GUIDED)              Polio Vaccine (Inactivated Polio)  Discontinued      08/30/2012  Imm Admin: IPV              Hepatitis C Screening  Discontinued     No completion history exists for this topic.                            Patient Care Team:  DORYS Pineda as PCP - General (Family Medicine)  Meet Barrios M.D. as Consulting Physician (Phys Med and Rehab)  [facility] (Inactive)        Social History[2]  Family History   Problem Relation Age of Onset    Cancer Mother     Heart Disease Father      She  has a past medical history of Anemia, Arrhythmia, Arthritis, Cancer (HCC), Heart burn, High cholesterol, HTN, Hyperlipemia, Indigestion, Low back pain (09-28-11), and Urinary bladder disorder.   Past Surgical History[3]    Exam:   /64 (BP Location: Left arm, Patient Position: Sitting, BP Cuff Size: Large adult)   Pulse 96   Temp 36.2 °C (97.1 °F)   Resp 16   Ht 1.626 m (5' 4\")   Wt 88 kg (194 lb)   SpO2 96%  Body mass index is 33.3 " kg/m².    Hearing excellent.    Dentition good  Alert, oriented in no acute distress.  Eye contact is good, speech goal directed, affect calm    Assessment and Plan. The following treatment and monitoring plan is recommended:    1. Other hyperlipidemia  TSH    Comp Metabolic Panel    CBC WITH DIFFERENTIAL    Lipid Profile      2. Medicare annual wellness visit, subsequent        3. Chronic bilateral low back pain with bilateral sciatica  MR-LUMBAR SPINE-W/O      4. Bilateral hip pain  DX-HIP-BILATERAL-WITH PELVIS-3/4 VIEWS      5. Chronic pain of left knee  DX-KNEE COMPLETE 4+ LEFT      6. Insomnia, unspecified type  zolpidem (AMBIEN) 10 MG Tab      7. Type 2 diabetes mellitus without complication, without long-term current use of insulin (HCC)  Diabetic Monofilament LE Exam    HEMOGLOBIN A1C      8. Anxiety        9. Essential hypertension        10. Gastroesophageal reflux disease, unspecified whether esophagitis present        11. Generalized headaches - tylenol migraine works .  present since 50's.        12. History of bladder cancer        13. History of primary malignant neoplasm of urinary bladder        14. History of smoking (quit 2010 - total of 30 yrs)        15. Other iron deficiency anemia        16. Lung nodule - 5 mm 8/2020 / 2023 - stable.  recheck 2026        17. Primary osteoarthritis involving multiple joints        18. Stage 3b chronic kidney disease        19. Status post left hip replacement        Overall Fadumo is well.  Her biggest problem is her pain.  She wanted to return to physical therapy and that was done for her.  I ordered updated imaging.  She does not want to return to physiatry as she states that she did not find that helpful but I encouraged her to consider returning if physical therapy is unable to alleviate a lot of her pain.  She is working on further weight loss.  I did increase her Mounjaro to 12.5 mg weekly to help with continuing tight control of diabetes and to facilitate  further weight loss.  I changed her quetiapine to Ambien, this worked better for her previously.  We did discuss potential long-term memory deficits which can also occur with lack of sleep.  Recommend updated labs 6 months.  Encouraged daily physical exercise and a diabetic diet.  Encouraged her to see her eye doctor yearly.  Reviewed recent notes with her nephrologist.  She is up-to-date with her urologist regarding her history of bladder cancer.  Reviewed last CT scan of her chest which did not indicate any follow-ups.  Reviewed negative pathology from thyroid biopsy.    Services suggested: No services needed at this time  Health Care Screening: Age-appropriate preventive services recommended by USPTF and ACIP covered by Medicare were discussed today. Services ordered if indicated and agreed upon by the patient.  Referrals offered: Community-based lifestyle interventions to reduce health risks and promote self-management and wellness, fall prevention, nutrition, physical activity, tobacco-use cessation, weight loss, and mental health services as per orders if indicated.    Discussion today about general wellness and lifestyle habits:    Prevent falls and reduce trip hazards; Cautioned about securing or removing rugs.  Have a working fire alarm and carbon monoxide detector;   Engage in regular physical activity and social activities     Follow-up: 6 months         [1]   Current Outpatient Medications   Medication Sig Dispense Refill    ALPRAZolam (XANAX) 0.5 MG Tab Take 1 Tablet by mouth 1 time a day as needed for Anxiety for up to 30 days. 30 Tablet 0    MOUNJARO 10 MG/0.5ML Solution Auto-injector ADMINISTER 10 MG UNDER THE SKIN EVERY 7 DAYS 2 mL 2    gabapentin (NEURONTIN) 300 MG Cap TAKE 1 TO 2 CAPSULES BY MOUTH THREE TIMES DAILY AS NEEDED FOR PAIN 180 Capsule 3    atorvastatin (LIPITOR) 40 MG Tab TAKE 1 TABLET BY MOUTH EVERY DAY 90 Tablet 3    lisinopril (PRINIVIL) 20 MG Tab TAKE 1 TABLET BY MOUTH EVERY DAY 90  Tablet 3    metFORMIN ER (GLUCOPHAGE XR) 500 MG TABLET SR 24 HR TAKE 2 TABLETS BY MOUTH EVERY DAY WITH A MEAL 180 Tablet 3    QUEtiapine (SEROQUEL) 100 MG Tab Take 1 Tablet by mouth every evening. 90 Tablet 3    Semaglutide, 2 MG/DOSE, (OZEMPIC, 2 MG/DOSE,) 8 MG/3ML Solution Pen-injector Inject 2 mg under the skin every 7 days. 9 mL 3    Lancets Lancets order: Lancets for meter covered by insurance. Sig: use bid and prn ssx high or low sugar. #100 RF x 3 100 Each 3    vitamin D (CHOLECALCIFEROL) 1000 UNIT Tab Take 1,000 Units by mouth every day.      coenzyme Q-10 30 MG capsule Take 60 mg by mouth every day.       No current facility-administered medications for this visit.   [2]   Social History  Tobacco Use    Smoking status: Former     Current packs/day: 0.00     Average packs/day: 0.5 packs/day for 30.0 years (15.0 ttl pk-yrs)     Types: Cigarettes     Start date: 1981     Quit date: 2011     Years since quittin.3    Smokeless tobacco: Never   Vaping Use    Vaping status: Never Used   Substance Use Topics    Alcohol use: Yes     Alcohol/week: 2.4 oz     Types: 4 Standard drinks or equivalent per week     Comment: 4 a week     Drug use: Not Currently   [3]   Past Surgical History:  Procedure Laterality Date    HI TOTAL HIP ARTHROPLASTY Left 2022    Procedure: LEFT ANTERIOR TOTAL HIP ARTHROPLASTY;  Surgeon: Sonido Myrick M.D.;  Location: [facility];  Service: Orthopedics    TRANS URETHRAL RESECTION BLADDER  2018    Procedure: TRANS URETHRAL RESECTION BLADDER- TUMOR;  Surgeon: Sigifredo Montes M.D.;  Location: [facility];  Service: Urology    CYSTOSCOPY  10/11/2011    Performed by MOLINA LOGAN at [facility]    VAGINAL PERINEAL EXAM REPAIR  10/11/2011    Performed by MOLINA LOGAN at [facility]    HYSTERECTOMY ROBOTIC  10/11/2011    Performed by MOLINA LOGAN at [facility]    PRIMARY C SECTION      x   2    TONSILLECTOMY AND ADENOIDECTOMY      age 7

## 2025-06-16 NOTE — LETTER
[facility]  Ursula Mccord, A.P.N.  [address]  [place] [address]  Fax: [phone]   Authorization for Release/Disclosure of   Protected Health Information   Name: JOAN ADAIR : 1955 SSN: [id number]   Address: [address] [place] [address] [place] [address] Phone:    [phone] (home)    I authorize the entity listed below to release/disclose the PHI below to:   [facility]/Ursula Mccord, A.P.N. and Ursula Mccord, A.P.N.   Provider or Entity Name:     Address   City, State, Zip  barring Phone:      Fax:     Reason for request: continuity of care   Information to be released:    [  ] LAST COLONOSCOPY,  including any PATH REPORT and follow-up  [  ] LAST FIT/COLOGUARD RESULT [  ] LAST DEXA  [  ] LAST MAMMOGRAM  [  ] LAST PAP  [  ] LAST LABS [ XX ] RETINA EXAM REPORT  [  ] IMMUNIZATION RECORDS  [  ] Release all info      [  ] Check here and initial the line next to each item to release ALL health information INCLUDING  _____ Care and treatment for drug and / or alcohol abuse  _____ HIV testing, infection status, or AIDS  _____ Genetic Testing    DATES OF SERVICE OR TIME PERIOD TO BE DISCLOSED: _____________  I understand and acknowledge that:  * This Authorization may be revoked at any time by you in writing, except if your health information has already been used or disclosed.  * Your health information that will be used or disclosed as a result of you signing this authorization could be re-disclosed by the recipient. If this occurs, your re-disclosed health information may no longer be protected by State or Federal laws.  * You may refuse to sign this Authorization. Your refusal will not affect your ability to obtain treatment.  * This Authorization becomes effective upon signing and will  on (date) __________.      If no date is indicated, this Authorization will  one (1) year from the signature date.    Name: Joan Adair  Signature: Date:    6/16/2025     PLEASE FAX REQUESTED RECORDS BACK TO: [phone]

## 2025-06-23 NOTE — Clinical Note
REFERRAL APPROVAL NOTICE         Sent on June 23, 2025                   Fadumo Adair  [address] [place] [address]                   Dear Ms. Adair,    After a careful review of the medical information and benefit coverage, [facility] has processed your referral. See below for additional details.    If applicable, you must be actively enrolled with your insurance for coverage of the authorized service. If you have any questions regarding your coverage, please contact your insurance directly.    REFERRAL INFORMATION   Referral #:  [id number]  Referred-To Department    Referred-By Provider:  Physical Therapy    DORYS Pineda   [facility] [place]      [address]  [place] [address]  [phone] [address], [address]  [place] [address]  [phone]    Referral Start Date:  06/16/2025  Referral End Date:   06/16/2026             SCHEDULING  If you do not already have an appointment, please call [phone] to make an appointment.     MORE INFORMATION  If you do not already have a [employer] account, sign up at: [email or web address]  You can access your medical information, make appointments, see lab results, billing information, and more.  If you have questions regarding this referral, please contact  the [facility] Referrals department at:             [phone]. Monday - Friday 8:00AM - 5:00PM.     Sincerely,    [facility]

## 2025-06-24 ENCOUNTER — PHYSICAL THERAPY (OUTPATIENT)
Dept: PHYSICAL THERAPY | Facility: REHABILITATION | Age: 70
End: 2025-06-24
Attending: NURSE PRACTITIONER
Payer: MEDICARE

## 2025-06-24 DIAGNOSIS — M54.41 CHRONIC BILATERAL LOW BACK PAIN WITH BILATERAL SCIATICA: Primary | ICD-10-CM

## 2025-06-24 DIAGNOSIS — M54.42 CHRONIC BILATERAL LOW BACK PAIN WITH BILATERAL SCIATICA: Primary | ICD-10-CM

## 2025-06-24 DIAGNOSIS — M25.552 BILATERAL HIP PAIN: ICD-10-CM

## 2025-06-24 DIAGNOSIS — G89.29 CHRONIC PAIN OF LEFT KNEE: ICD-10-CM

## 2025-06-24 DIAGNOSIS — M25.562 CHRONIC PAIN OF LEFT KNEE: ICD-10-CM

## 2025-06-24 DIAGNOSIS — M25.551 BILATERAL HIP PAIN: ICD-10-CM

## 2025-06-24 DIAGNOSIS — G89.29 CHRONIC BILATERAL LOW BACK PAIN WITH BILATERAL SCIATICA: Primary | ICD-10-CM

## 2025-06-24 PROCEDURE — 97110 THERAPEUTIC EXERCISES: CPT

## 2025-06-24 PROCEDURE — 97162 PT EVAL MOD COMPLEX 30 MIN: CPT

## 2025-06-24 SDOH — ECONOMIC STABILITY: GENERAL: QUALITY OF LIFE: FAIR

## 2025-06-24 ASSESSMENT — ENCOUNTER SYMPTOMS
QUALITY: DEEP
PAIN SCALE AT HIGHEST: 5
PAIN TIMING: SPORADIC
QUALITY: ACHING
PAIN TIMING: IN THE EVENING
PAIN SCALE AT LOWEST: 2
PAIN SCALE: 2
QUALITY: DULL ACHE
QUALITY: RADIATING
QUALITY: SHOOTING

## 2025-06-24 NOTE — OP THERAPY EVALUATION
Outpatient Physical Therapy  INITIAL EVALUATION    [facility]  [address], [address]  [facility] [address]  Phone:  [phone]  Fax:  [phone]    Date of Evaluation: 06/24/2025    Patient: Fadumo Adair  YOB: 1955  MRN: [id number]     Referring Provider: DORYS Pineda  [address] [address]   Referring Diagnosis Chronic bilateral low back pain with bilateral sciatica [M54.42, M54.41, G89.29];Bilateral hip pain [M25.551, M25.552];Chronic pain of left knee [M25.562, G89.29]     Time Calculation  Start time: 1030  Stop time: 1115 Time Calculation (min): 45 minutes         Chief Complaint: Back Problem, Hip Problem, and Knee Problem    Visit Diagnoses     ICD-10-CM   1. Chronic bilateral low back pain with bilateral sciatica  M54.42    M54.41    G89.29   2. Bilateral hip pain  M25.551    M25.552   3. Chronic pain of left knee  M25.562    G89.29       Date of onset of impairment: 6/24/2022    Subjective:   History of Present Illness:     Date of onset:  6/24/2022    Mechanism of injury:  Pt is a 68 yo female presenting to  with c/o LBP and B hip and B knee pain. Pt reports the pain has been ongoing for multiple years. Pt denies and JC. Pt reports this referral was made since she saw her doctor and she recommended being seeing again. Pt has been seen at [facility] multiple times for low back pan and post op ELIJAH L side. Pt reports at night L>R knee pain. Pt reports increase pain with walking, sitting. Pt also reports she sometimes drags her RLE with walking. Pt reports she does stumble often especially in low light areas. Pt one fall in the last year on a hike no injuries. Pt reports radiating pain down BLE. Pt is unable to recall any of her past exercises and reports she just needs to get stronger.    Aggs: sitting( X=1 hour), Walking (X=30 mins), standing (x=1 hour)  Easers: rest, ice   Irritability: moderate       PMHx: Left hip replacement   Quality of life:  Fair  Sleep disturbance:  Interrupted sleep (every night R and L knee pain side sleeper)  Pain:     Current pain ratin    At best pain ratin    At worst pain ratin (later in the day)    Quality:  Aching, dull ache, radiating, deep and shooting    Pain timing:  In the evening and sporadic    Progression:  Unchanged  Social Support:     Lives in:  One-story house  Diagnostic Tests:     Diagnostic Tests Comments:  Pt has ordered/scheduled X-ray or  MRI for B knees, lumbar spine, and B hips. MRI l/s   Treatments:     Previous treatment:  Physical therapy  Activities of Daily Living:     Patient reported ADL status: Pt reports she is retired and now she likes to travel, babysit and garden which can increase pain as she fatigues. Pt reports all ADL's are independent   Patient Goals:     Patient goals for therapy:  Increased motion, decreased pain and increased strength      Past Medical History[1]  Past Surgical History[2]  Social History     Tobacco Use    Smoking status: Former     Current packs/day: 0.00     Average packs/day: 0.5 packs/day for 30.0 years (15.0 ttl pk-yrs)     Types: Cigarettes     Start date: 1981     Quit date: 2011     Years since quittin.4    Smokeless tobacco: Never   Substance Use Topics    Alcohol use: Yes     Alcohol/week: 2.4 oz     Types: 4 Standard drinks or equivalent per week     Comment: 4 a week      Social Hx - Family and Occupational[3]    Objective     Postural Observations  Seated posture: fair  Standing posture: fair      Hip Screen   Hip range of motion within functional limits.    Palpation   Left   Hypertonic in the piriformis.   Tenderness of the distal biceps femoris, distal semimembranosus, distal semitendinosus and lumbar paraspinals.   Trigger point to piriformis.     Right   Hypertonic in the piriformis.   Tenderness of the distal biceps femoris, distal semimembranosus, distal  semitendinosus and lumbar paraspinals.     Tenderness     Left Hip   Tenderness in the PSIS and greater trochanter.      Right Hip   Tenderness in the PSIS and greater trochanter.   Left Knee   No tenderness in the LCL (distal), LCL (proximal), MCL (distal) and MCL (proximal).     Right Knee   No tenderness in the LCL (distal), LCL (proximal), MCL (distal) and MCL (proximal).     Active Range of Motion     Lumbar   Flexion: within functional limits  Extension: within functional limits  Left lateral flexion: within functional limits  Right lateral flexion: within functional limits  Left rotation: within functional limits  Right rotation: within functional limits  Left Knee   Normal active range of motion    Right Knee   Normal active range of motion    Strength:      Left Hip   Planes of Motion   Flexion: 4  Extension: 4+  Abduction: 3+    Right Hip   Planes of Motion   Flexion: 4  Extension: 4+  Abduction: 3+    Left Knee   Flexion: 4  Extension: 4    Right Knee   Flexion: 4  Extension: 4    Additional Strength Details  5 STS: 12 secs no hands   Bridge: 35 secs prior fatigue       Tests       Lumbar spine (left)      Negative slump.   Lumbar spine (right)     Negative slump.     Left Hip   SLR: Negative.     Right Hip   SLR: Negative.         Therapeutic Exercises (CPT 56875):       Therapeutic Exercise Summary: Pt was given HEP prior to leaving and verbalized understanding.  Access Code: [email or web address]  URL: [email or web address]  Date: 06/24/2025  Prepared by: Remberto Gaspar    Exercises  - Supine Bridge  - 1 x daily - 7 x weekly - 2-3 sets - 10 reps - 5-10 secs hold  - Clamshell with Resistance  - 1 x daily - 7 x weekly - 3 sets - 12-15 reps  - Sit to Stand Without Arm Support  - 1 x daily - 7 x weekly - 3 sets - 8-12 reps  - Heel Raises with Counter Support  - 1 x daily - 7 x weekly - 3 sets - 12-15 reps      Time-based treatments/modalities:    Physical Therapy Timed Treatment Charges  Therapeutic exercise  minutes (CPT 40331): 15 minutes      Assessment, Response and Plan:   Impairments: activity intolerance, lacks appropriate home exercise program and pain with function    Assessment details:  Pt is a 68 yo cis-female presenting to PT w/ clinical findings suggestive of chronic pain in LB, B hips and knees due to general deconditioning. Pertinent clinical findings include decrease strength in BLE, point tenderness, and subjective reports of decreased ambulation tolerance. Impairments are associated w/ decrease functional activity tolerance. The patient would benefit from skilled PT to address strength and ROM deficits in order to increase participation with daily activities. The patient states they understand and agree with the plan of care. HEP w/ handout was reviewed and provided to the pt.    Barriers to therapy:  Non-compliant with treatment regimen  Prognosis: fair    Goals:   Short Term Goals:   1. Pt will be independent with HEP 3-5x per week for improving strength, ROM and decreasing pain  2. Pt will demo ability to initiate core contraction/TA bracing mechanics for improved stability  3. Pt will report ability to tolerate sitting 1 hour with pain 1/10 or less in order to improve ability to watch tv  Short term goal time span:  2-4 weeks      Long Term Goals:    1. Pt will demo improved global hip strength with bridge 2 minutes or better for improved stability and decreased pain  2. Pt will demo the ability to complete HEP with 100% independence in order to complete upon d/c from care.    3. Pt will report ability to ambulate community distances with pain 1/10 or less in order to improve functional activity tolerance  Long term goal time span:  6-8 weeks    Plan:   Therapy options:  Physical therapy treatment to continue  Planned therapy interventions:  Neuromuscular Re-education (CPT 17413), Therapeutic Exercise (CPT 73066), Therapeutic Activities (CPT 36725), Manual Therapy (CPT 89708) and E Stim Unattended  (CPT 06106)  Frequency:  2x week  Duration in weeks:  6  Duration in visits:  12  Discussed with:  Patient      Functional Assessment Used  PT Functional Assessment Tool Used: LEFS  PT Functional Assessment Score: 42/80     Referring provider co-signature:  I have reviewed this plan of care and my co-signature certifies the need for services.    Certification Period: 06/24/2025 to  08/05/25    Physician Signature: ________________________________ Date: ______________                      [1]   Past Medical History:  Diagnosis Date    Anemia     Arrhythmia     mild, pt not sure problem, does not see cardiologist     Arthritis     hip, knees, back    Cancer (HCC)     bladder    Heart burn     High cholesterol     HTN     Hyperlipemia     Indigestion     Low back pain 09-28-11    hips, and knees    Urinary bladder disorder    [2]   Past Surgical History:  Procedure Laterality Date    IA TOTAL HIP ARTHROPLASTY Left 1/17/2022    Procedure: LEFT ANTERIOR TOTAL HIP ARTHROPLASTY;  Surgeon: Sonido Myrick M.D.;  Location: [facility];  Service: Orthopedics    TRANS URETHRAL RESECTION BLADDER  5/8/2018    Procedure: TRANS URETHRAL RESECTION BLADDER- TUMOR;  Surgeon: Sigifredo Montes M.D.;  Location: [facility];  Service: Urology    CYSTOSCOPY  10/11/2011    Performed by MOLINA LOGAN at [facility]    VAGINAL PERINEAL EXAM REPAIR  10/11/2011    Performed by MOLINA LOGAN at [facility]    HYSTERECTOMY ROBOTIC  10/11/2011    Performed by MOLINA LOGAN at [facility]    PRIMARY C SECTION      x  2    TONSILLECTOMY AND ADENOIDECTOMY      age 7   [3]   Family and Occupational History  Socioeconomic History    Marital status:      Spouse name: Not on file    Number of children: Not on file    Years of education: Not on file    Highest education level: Not on file   Occupational History    Not on file

## 2025-07-09 ENCOUNTER — APPOINTMENT (OUTPATIENT)
Dept: PHYSICAL THERAPY | Facility: REHABILITATION | Age: 70
End: 2025-07-09
Attending: NURSE PRACTITIONER
Payer: MEDICARE

## 2025-07-10 DIAGNOSIS — F51.01 PRIMARY INSOMNIA: ICD-10-CM

## 2025-07-10 RX ORDER — QUETIAPINE FUMARATE 100 MG/1
100 TABLET, FILM COATED ORAL EVERY EVENING
Qty: 90 TABLET | Refills: 3 | Status: SHIPPED | OUTPATIENT
Start: 2025-07-10

## 2025-07-10 NOTE — TELEPHONE ENCOUNTER
Received request via: Pharmacy    Was the patient seen in the last year in this department? Yes    Does the patient have an active prescription (recently filled or refills available) for medication(s) requested? No    Pharmacy Name: [id number] - [place], [place] [address] AT [facility] [address]     Does the patient have alf Plus and need 100-day supply? (This applies to ALL medications) Patient does not have SCP

## 2025-07-11 ENCOUNTER — APPOINTMENT (OUTPATIENT)
Dept: PHYSICAL THERAPY | Facility: REHABILITATION | Age: 70
End: 2025-07-11
Attending: NURSE PRACTITIONER
Payer: MEDICARE

## 2025-07-12 ENCOUNTER — HOSPITAL ENCOUNTER (OUTPATIENT)
Dept: RADIOLOGY | Facility: MEDICAL CENTER | Age: 70
End: 2025-07-12
Attending: NURSE PRACTITIONER
Payer: MEDICARE

## 2025-07-12 DIAGNOSIS — M54.42 CHRONIC BILATERAL LOW BACK PAIN WITH BILATERAL SCIATICA: ICD-10-CM

## 2025-07-12 DIAGNOSIS — G89.29 CHRONIC BILATERAL LOW BACK PAIN WITH BILATERAL SCIATICA: ICD-10-CM

## 2025-07-12 DIAGNOSIS — M54.41 CHRONIC BILATERAL LOW BACK PAIN WITH BILATERAL SCIATICA: ICD-10-CM

## 2025-07-12 PROCEDURE — 72148 MRI LUMBAR SPINE W/O DYE: CPT

## 2025-07-14 ENCOUNTER — PHYSICAL THERAPY (OUTPATIENT)
Dept: PHYSICAL THERAPY | Facility: REHABILITATION | Age: 70
End: 2025-07-14
Attending: NURSE PRACTITIONER
Payer: MEDICARE

## 2025-07-14 DIAGNOSIS — M54.41 CHRONIC BILATERAL LOW BACK PAIN WITH BILATERAL SCIATICA: Primary | ICD-10-CM

## 2025-07-14 DIAGNOSIS — G89.29 CHRONIC PAIN OF LEFT KNEE: ICD-10-CM

## 2025-07-14 DIAGNOSIS — M25.552 BILATERAL HIP PAIN: ICD-10-CM

## 2025-07-14 DIAGNOSIS — G89.29 CHRONIC BILATERAL LOW BACK PAIN WITH BILATERAL SCIATICA: Primary | ICD-10-CM

## 2025-07-14 DIAGNOSIS — M54.42 CHRONIC BILATERAL LOW BACK PAIN WITH BILATERAL SCIATICA: Primary | ICD-10-CM

## 2025-07-14 DIAGNOSIS — M25.551 BILATERAL HIP PAIN: ICD-10-CM

## 2025-07-14 DIAGNOSIS — M25.562 CHRONIC PAIN OF LEFT KNEE: ICD-10-CM

## 2025-07-14 PROCEDURE — 97110 THERAPEUTIC EXERCISES: CPT

## 2025-07-14 NOTE — OP THERAPY DAILY TREATMENT
Outpatient Physical Therapy  DAILY TREATMENT     [facility] [place]  [address], [address]  [facility] [address]  Phone:  [phone]  Fax:  [phone]    Date: 07/14/2025    Patient: Fadumo Adair  YOB: 1955  MRN: [id number]     Time Calculation    Start time: 1330  Stop time: 1415 Time Calculation (min): 45 minutes         Chief Complaint: Back Problem, Hip Problem, and Knee Problem    Visit #: 2    SUBJECTIVE:  Pt reports she was sore when leaving last visit. Pt reports she has been completing her HEP sometimes and it may be helping. Pt reports she completed an MRI but has not discussed it with her PCP.      OBJECTIVE:  Current objective measures:           Therapeutic Exercises (CPT 71550):     1. NUstep, 10 mins, L3    2. bridges, 3x10, 5 sec holds    3. Clamshells, 3x10, PTB    4. Shuttle, 2x2 mins DL and SL, 6 C 3 C    5. heel raises, 3x12      Therapeutic Exercise Summary: Pt was given HEP prior to leaving and verbalized understanding.  Access Code: [email or web address]  URL: [email or web address]  Date: 06/24/2025  Prepared by: Remberto Gaspar    Exercises  - Supine Bridge  - 1 x daily - 7 x weekly - 2-3 sets - 10 reps - 5-10 secs hold  - Clamshell with Resistance  - 1 x daily - 7 x weekly - 3 sets - 12-15 reps  - Sit to Stand Without Arm Support  - 1 x daily - 7 x weekly - 3 sets - 8-12 reps  - Heel Raises with Counter Support  - 1 x daily - 7 x weekly - 3 sets - 12-15 reps      Time-based treatments/modalities:    Physical Therapy Timed Treatment Charges  Therapeutic exercise minutes (CPT 08288): 45 minutes          ASSESSMENT:   Response to treatment: Pt tolerated treatment well today. PT reviewed MRI findings with pt but PT told her to contact her PCP if she has any more questions. Progressed pt strength exercises with focus on BLE strength.Will introduce balance exercises at next visit due to pt comments on her balance when walking.  Pt will still benefit from  skilled physical therapy in order to improve functional activity tolerance.     PLAN/RECOMMENDATIONS:   Plan for treatment: therapy treatment to continue next visit.  Planned interventions for next visit: continue with current treatment.

## 2025-07-21 ENCOUNTER — PHYSICAL THERAPY (OUTPATIENT)
Dept: PHYSICAL THERAPY | Facility: REHABILITATION | Age: 70
End: 2025-07-21
Attending: NURSE PRACTITIONER
Payer: MEDICARE

## 2025-07-21 DIAGNOSIS — G89.29 CHRONIC PAIN OF LEFT KNEE: ICD-10-CM

## 2025-07-21 DIAGNOSIS — M54.41 CHRONIC BILATERAL LOW BACK PAIN WITH BILATERAL SCIATICA: Primary | ICD-10-CM

## 2025-07-21 DIAGNOSIS — M25.562 CHRONIC PAIN OF LEFT KNEE: ICD-10-CM

## 2025-07-21 DIAGNOSIS — G89.29 CHRONIC BILATERAL LOW BACK PAIN WITH BILATERAL SCIATICA: Primary | ICD-10-CM

## 2025-07-21 DIAGNOSIS — M25.552 BILATERAL HIP PAIN: ICD-10-CM

## 2025-07-21 DIAGNOSIS — M54.42 CHRONIC BILATERAL LOW BACK PAIN WITH BILATERAL SCIATICA: Primary | ICD-10-CM

## 2025-07-21 DIAGNOSIS — M25.551 BILATERAL HIP PAIN: ICD-10-CM

## 2025-07-21 PROCEDURE — 97110 THERAPEUTIC EXERCISES: CPT

## 2025-07-21 NOTE — OP THERAPY DAILY TREATMENT
Outpatient Physical Therapy  DAILY TREATMENT     [facility] [place]  [address], [address]  [facility] [address]  Phone:  [phone]  Fax:  [phone]    Date: 07/21/2025    Patient: Fadumo Adair  YOB: 1955  MRN: [id number]     Time Calculation    Start time: 0945  Stop time: 1030 Time Calculation (min): 45 minutes         Chief Complaint: Back Problem, Hip Problem, and Knee Problem    Visit #: 3    SUBJECTIVE:  Pt reports she has been feeling stronger since starting PT. Pt reports she is doing her HEP but does get some pain with clamshells she can't recall if it is joint or muscle soreness.     OBJECTIVE:  Current objective measures:           Therapeutic Exercises (CPT 10495):     1. NUstep, 10 mins, L4    2. bridges, 3x10, NT    3. Clamshells, 3x10    4. Shuttle, 2x2 mins DL and SL, 7C/4C    5. heel raises, 3x12    6. kick stand balance, 5x20 secs, HEP    7. heel to toe, 5x20 ft      Therapeutic Exercise Summary: Pt was given HEP prior to leaving and verbalized understanding.  Access Code: [email or web address]  URL: [email or web address]  Date: 06/24/2025  Prepared by: Remberto Gaspar    Exercises  - Supine Bridge  - 1 x daily - 7 x weekly - 2-3 sets - 10 reps - 5-10 secs hold  - Clamshell with Resistance  - 1 x daily - 7 x weekly - 3 sets - 12-15 reps  - Sit to Stand Without Arm Support  - 1 x daily - 7 x weekly - 3 sets - 8-12 reps  - Heel Raises with Counter Support  - 1 x daily - 7 x weekly - 3 sets - 12-15 reps      Time-based treatments/modalities:    Physical Therapy Timed Treatment Charges  Therapeutic exercise minutes (CPT 35430): 45 minutes        ASSESSMENT:   Response to treatment: Pt tolerated treatment well today. Progressed pt strength exercises and discussed her HEP and clamshells. Pt verbalized understanding and that she is doing them properly following vc. Introduced balance exercises due to reported issues with walking on uneven surfaces.  Pt will  still benefit from skilled physical therapy in order to improve functional activity tolerance.      PLAN/RECOMMENDATIONS:   Plan for treatment: therapy treatment to continue next visit.  Planned interventions for next visit: continue with current treatment.

## 2025-07-25 ENCOUNTER — APPOINTMENT (OUTPATIENT)
Dept: PHYSICAL THERAPY | Facility: REHABILITATION | Age: 70
End: 2025-07-25
Attending: NURSE PRACTITIONER
Payer: MEDICARE

## 2025-08-06 ENCOUNTER — PATIENT MESSAGE (OUTPATIENT)
Dept: MEDICAL GROUP | Facility: LAB | Age: 70
End: 2025-08-06
Payer: MEDICARE

## 2025-08-06 DIAGNOSIS — F41.9 ANXIETY: ICD-10-CM

## 2025-08-06 RX ORDER — ALPRAZOLAM 0.5 MG
0.5 TABLET ORAL
Qty: 30 TABLET | Refills: 0 | Status: SHIPPED | OUTPATIENT
Start: 2025-08-06 | End: 2025-09-05

## (undated) DEVICE — SUCTION INSTRUMENT YANKAUER BULBOUS TIP W/O VENT (50EA/CA)

## (undated) DEVICE — PROTECTOR ULNA NERVE - (36PR/CA)

## (undated) DEVICE — SET EXTENSION WITH 2 PORTS (48EA/CA) ***PART #2C8610 IS A SUBSTITUTE*****

## (undated) DEVICE — MASK ANESTHESIA ADULT  - (100/CA)

## (undated) DEVICE — DRESSING NON ADHERENT 3 X 4 - STERILE (100/BX 12BX/CA)

## (undated) DEVICE — GOWN SURGICAL X-LARGE ULTRA - FILM-REINFORCED (20/CA)

## (undated) DEVICE — KIT ROOM DECONTAMINATION

## (undated) DEVICE — SENSOR SPO2 NEO LNCS ADHESIVE (20/BX) SEE USER NOTES

## (undated) DEVICE — HEAD HOLDER JUNIOR/ADULT

## (undated) DEVICE — ELECTRODE DUAL RETURN W/ CORD - (50/PK)

## (undated) DEVICE — DETERGENT RENUZYME PLUS 10 OZ PACKET (50/BX)

## (undated) DEVICE — NEEDLE SAFETY 18 GA X 1 1/2 IN (100EA/BX)

## (undated) DEVICE — SPONGE GAUZESTER 4 X 4 4PLY - (128PK/CA)

## (undated) DEVICE — BAG DRAINAGE URINARY CLOSED 2000ML (20EA/CA)

## (undated) DEVICE — COVER FOOT UNIVERSAL DISP. - (25EA/CA)

## (undated) DEVICE — CONNECTOR HOSE NEPTUNE FOR CYSTO ROOM

## (undated) DEVICE — SYRINGE 30 ML LL (56/BX)

## (undated) DEVICE — GLOVE BIOGEL PI INDICATOR SZ 7.0 SURGICAL PF LF - (50/BX 4BX/CA)

## (undated) DEVICE — ELECTRODE 850 FOAM ADHESIVE - HYDROGEL RADIOTRNSPRNT (50/PK)

## (undated) DEVICE — KIT ANESTHESIA W/CIRCUIT & 3/LT BAG W/FILTER (20EA/CA)

## (undated) DEVICE — TUBING CLEARLINK DUO-VENT - C-FLO (48EA/CA)

## (undated) DEVICE — CONTAINER SPECIMEN BAG OR - STERILE 4 OZ W/LID (100EA/CA)

## (undated) DEVICE — SET IRRIGATION CYSTOSCOPY Y-TYPE L81 IN (20EA/CA)

## (undated) DEVICE — GLOVE BIOGEL SZ 8 SURGICAL PF LTX - (50PR/BX 4BX/CA)

## (undated) DEVICE — TOWELS CLOTH SURGICAL - (4/PK 20PK/CA)

## (undated) DEVICE — TUBE CONNECT SUCTION CLEAR 120 X 1/4" (50EA/CA)"

## (undated) DEVICE — PACK SINGLE BASIN - (6/CA)

## (undated) DEVICE — NEPTUNE 4 PORT MANIFOLD - (20/PK)

## (undated) DEVICE — JELLY, KY 2 0Z STERILE

## (undated) DEVICE — MASK, LARYNGEAL AIRWAY #4

## (undated) DEVICE — SET LEADWIRE 5 LEAD BEDSIDE DISPOSABLE ECG (1SET OF 5/EA)

## (undated) DEVICE — SODIUM CHL. IRRIGATION 0.9% 3000ML (4EA/CA 65CA/PF)

## (undated) DEVICE — PLUG CATHETER DRAIN TUBE PROTECTOR CAP

## (undated) DEVICE — CATHETER URTH FOLEY 18FR 30ML 3-WAY

## (undated) DEVICE — GLOVE BIOGEL PI INDICATOR SZ 8.0 SURGICAL PF LF -(50/BX 4BX/CA)

## (undated) DEVICE — LACTATED RINGERS INJ 1000 ML - (14EA/CA 60CA/PF)

## (undated) DEVICE — GOWN WARMING STANDARD FLEX - (30/CA)